# Patient Record
Sex: MALE | Race: WHITE | NOT HISPANIC OR LATINO | Employment: OTHER | URBAN - METROPOLITAN AREA
[De-identification: names, ages, dates, MRNs, and addresses within clinical notes are randomized per-mention and may not be internally consistent; named-entity substitution may affect disease eponyms.]

---

## 2017-05-10 LAB — CRC RECOMMENDATION EXT: NORMAL

## 2017-06-06 ENCOUNTER — HISTORICAL (OUTPATIENT)
Dept: RADIOLOGY | Facility: HOSPITAL | Age: 67
End: 2017-06-06

## 2017-06-14 ENCOUNTER — HISTORICAL (OUTPATIENT)
Dept: ADMINISTRATIVE | Facility: HOSPITAL | Age: 67
End: 2017-06-14

## 2017-06-14 LAB
ABS NEUT (OLG): 7.44 X10(3)/MCL (ref 2.1–9.2)
ALBUMIN SERPL-MCNC: 3.1 GM/DL (ref 3.4–5)
ALBUMIN/GLOB SERPL: 0.8 RATIO (ref 1.1–2)
ALP SERPL-CCNC: 65 UNIT/L (ref 50–136)
ALT SERPL-CCNC: 50 UNIT/L (ref 12–78)
AST SERPL-CCNC: 32 UNIT/L (ref 15–37)
BASOPHILS # BLD AUTO: 0 X10(3)/MCL (ref 0–0.2)
BASOPHILS NFR BLD AUTO: 0.5 %
BILIRUB SERPL-MCNC: 0.4 MG/DL (ref 0.2–1)
BILIRUBIN DIRECT+TOT PNL SERPL-MCNC: 0.1 MG/DL (ref 0–0.5)
BILIRUBIN DIRECT+TOT PNL SERPL-MCNC: 0.3 MG/DL (ref 0–0.8)
BUN SERPL-MCNC: 16 MG/DL (ref 7–18)
CALCIUM SERPL-MCNC: 9.2 MG/DL (ref 8.5–10.1)
CHLORIDE SERPL-SCNC: 98 MMOL/L (ref 98–107)
CO2 SERPL-SCNC: 28 MMOL/L (ref 21–32)
CREAT SERPL-MCNC: 0.87 MG/DL (ref 0.7–1.3)
EOSINOPHIL # BLD AUTO: 0.2 X10(3)/MCL (ref 0–0.9)
EOSINOPHIL NFR BLD AUTO: 1.6 %
ERYTHROCYTE [DISTWIDTH] IN BLOOD BY AUTOMATED COUNT: 13.4 % (ref 11.5–17)
GLOBULIN SER-MCNC: 3.8 GM/DL (ref 2.4–3.5)
GLUCOSE SERPL-MCNC: 148 MG/DL (ref 74–106)
HCT VFR BLD AUTO: 29.4 % (ref 42–52)
HGB BLD-MCNC: 9.3 GM/DL (ref 14–18)
LDH SERPL-CCNC: 272 UNIT/L (ref 87–241)
LYMPHOCYTES # BLD AUTO: 2.2 X10(3)/MCL (ref 0.6–4.6)
LYMPHOCYTES NFR BLD AUTO: 20 %
MCH RBC QN AUTO: 28.2 PG (ref 27–31)
MCHC RBC AUTO-ENTMCNC: 31.6 GM/DL (ref 33–36)
MCV RBC AUTO: 89.1 FL (ref 80–94)
MONOCYTES # BLD AUTO: 1.2 X10(3)/MCL (ref 0.1–1.3)
MONOCYTES NFR BLD AUTO: 10.6 %
NEUTROPHILS # BLD AUTO: 7.4 X10(3)/MCL (ref 2.1–9.2)
NEUTROPHILS NFR BLD AUTO: 67.3 %
PLATELET # BLD AUTO: 366 X10(3)/MCL (ref 130–400)
PMV BLD AUTO: 9.9 FL (ref 9.4–12.4)
POTASSIUM SERPL-SCNC: 4.9 MMOL/L (ref 3.5–5.1)
PROT SERPL-MCNC: 6.9 GM/DL (ref 6.4–8.2)
RBC # BLD AUTO: 3.3 X10(6)/MCL (ref 4.7–6.1)
SODIUM SERPL-SCNC: 138 MMOL/L (ref 136–145)
URATE SERPL-MCNC: 5.9 MG/DL (ref 2.6–7.2)
WBC # SPEC AUTO: 11 X10(3)/MCL (ref 4.5–11.5)

## 2017-06-26 ENCOUNTER — HISTORICAL (OUTPATIENT)
Dept: ADMINISTRATIVE | Facility: HOSPITAL | Age: 67
End: 2017-06-26

## 2017-06-26 LAB
ABS NEUT (OLG): 8.7 X10(3)/MCL (ref 2.1–9.2)
ALBUMIN SERPL-MCNC: 3.1 GM/DL (ref 3.4–5)
ALP SERPL-CCNC: 72 UNIT/L (ref 50–136)
ALT SERPL-CCNC: 26 UNIT/L (ref 12–78)
ANION GAP SERPL CALC-SCNC: 17 MMOL/L
AST SERPL-CCNC: 8 UNIT/L (ref 15–37)
BASOPHILS # BLD AUTO: 0 X10(3)/MCL (ref 0–0.2)
BASOPHILS NFR BLD AUTO: 0.1 %
BILIRUB SERPL-MCNC: 0.7 MG/DL (ref 0.2–1)
BILIRUBIN DIRECT+TOT PNL SERPL-MCNC: 0.2 MG/DL (ref 0–0.5)
BILIRUBIN DIRECT+TOT PNL SERPL-MCNC: 0.5 MG/DL (ref 0–0.8)
BUN SERPL-MCNC: 10 MG/DL (ref 7–18)
CHLORIDE SERPL-SCNC: 101 MMOL/L (ref 98–109)
CREAT SERPL-MCNC: 0.7 MG/DL (ref 0.6–1.3)
EOSINOPHIL # BLD AUTO: 0.2 X10(3)/MCL (ref 0–0.9)
EOSINOPHIL NFR BLD AUTO: 2 %
ERYTHROCYTE [DISTWIDTH] IN BLOOD BY AUTOMATED COUNT: 14.5 % (ref 11.5–17)
GLUCOSE SERPL-MCNC: 159 MG/DL (ref 70–105)
HCT VFR BLD AUTO: 36.2 % (ref 42–52)
HCT VFR BLD CALC: 34 % (ref 38–51)
HGB BLD-MCNC: 11.2 GM/DL (ref 14–18)
HGB BLD-MCNC: 11.6 MG/DL (ref 12–17)
LIVER PROFILE INTERP: ABNORMAL
LYMPHOCYTES # BLD AUTO: 0.9 X10(3)/MCL (ref 0.6–4.6)
LYMPHOCYTES NFR BLD AUTO: 9 %
MAGNESIUM SERPL-MCNC: 2.4 MG/DL (ref 1.8–2.4)
MCH RBC QN AUTO: 27.4 PG (ref 27–31)
MCHC RBC AUTO-ENTMCNC: 30.9 GM/DL (ref 33–36)
MCV RBC AUTO: 88.5 FL (ref 80–94)
MONOCYTES # BLD AUTO: 0 X10(3)/MCL (ref 0.1–1.3)
MONOCYTES NFR BLD AUTO: 0.4 %
NEUTROPHILS # BLD AUTO: 8.7 X10(3)/MCL (ref 2.1–9.2)
NEUTROPHILS NFR BLD AUTO: 88.5 %
PHOSPHATE SERPL-MCNC: 2.1 MG/DL (ref 2.5–4.9)
PLATELET # BLD AUTO: 154 X10(3)/MCL (ref 130–400)
PMV BLD AUTO: 9.7 FL (ref 9.4–12.4)
POC IONIZED CALCIUM: 1.11 MMOL/L (ref 1.12–1.32)
POC TCO2: 26 MMOL/L (ref 22–27)
POTASSIUM BLD-SCNC: 4 MMOL/L (ref 3.5–4.9)
PROT SERPL-MCNC: 6.2 GM/DL (ref 6.4–8.2)
RBC # BLD AUTO: 4.09 X10(6)/MCL (ref 4.7–6.1)
SODIUM BLD-SCNC: 139 MMOL/L (ref 138–146)
WBC # SPEC AUTO: 9.8 X10(3)/MCL (ref 4.5–11.5)

## 2017-06-29 ENCOUNTER — HISTORICAL (OUTPATIENT)
Dept: HEMATOLOGY/ONCOLOGY | Facility: CLINIC | Age: 67
End: 2017-06-29

## 2017-06-29 LAB
ABS NEUT (OLG): 1.49 X10(3)/MCL (ref 2.1–9.2)
ALBUMIN SERPL-MCNC: 3.6 GM/DL (ref 3.4–5)
ALP SERPL-CCNC: 74 UNIT/L (ref 50–136)
ALT SERPL-CCNC: 25 UNIT/L (ref 12–78)
ANION GAP SERPL CALC-SCNC: 18 MMOL/L
AST SERPL-CCNC: 9 UNIT/L (ref 15–37)
BASOPHILS # BLD AUTO: 0 X10(3)/MCL (ref 0–0.2)
BASOPHILS NFR BLD AUTO: 0.6 %
BASOPHILS NFR BLD MANUAL: 1 % (ref 0–2)
BILIRUB SERPL-MCNC: 0.7 MG/DL (ref 0.2–1)
BILIRUBIN DIRECT+TOT PNL SERPL-MCNC: 0.2 MG/DL (ref 0–0.2)
BILIRUBIN DIRECT+TOT PNL SERPL-MCNC: 0.5 MG/DL (ref 0–0.8)
BUN SERPL-MCNC: 8 MG/DL (ref 7–18)
CHLORIDE SERPL-SCNC: 100 MMOL/L (ref 98–109)
CREAT SERPL-MCNC: 1 MG/DL (ref 0.6–1.3)
EOSINOPHIL # BLD AUTO: 0.1 X10(3)/MCL (ref 0–0.9)
EOSINOPHIL NFR BLD AUTO: 3.4 %
EOSINOPHIL NFR BLD MANUAL: 4 % (ref 0–8)
ERYTHROCYTE [DISTWIDTH] IN BLOOD BY AUTOMATED COUNT: 14.6 % (ref 11.5–17)
GLUCOSE SERPL-MCNC: 188 MG/DL (ref 70–105)
HCT VFR BLD AUTO: 35.6 % (ref 42–52)
HCT VFR BLD CALC: 36 % (ref 38–51)
HGB BLD-MCNC: 11.2 GM/DL (ref 14–18)
HGB BLD-MCNC: 12.2 MG/DL (ref 12–17)
LIVER PROFILE INTERP: ABNORMAL
LYMPHOCYTES # BLD AUTO: 1.2 X10(3)/MCL (ref 0.6–4.6)
LYMPHOCYTES NFR BLD AUTO: 35.5 %
LYMPHOCYTES NFR BLD MANUAL: 35 % (ref 13–40)
MAGNESIUM SERPL-MCNC: 2.4 MG/DL (ref 1.8–2.4)
MCH RBC QN AUTO: 27.3 PG (ref 27–31)
MCHC RBC AUTO-ENTMCNC: 31.5 GM/DL (ref 33–36)
MCV RBC AUTO: 86.8 FL (ref 80–94)
METAMYELOCYTES NFR BLD MANUAL: 5 %
MONOCYTES # BLD AUTO: 0.6 X10(3)/MCL (ref 0.1–1.3)
MONOCYTES NFR BLD AUTO: 17.8 %
MONOCYTES NFR BLD MANUAL: 10 % (ref 2–11)
MYELOCYTES NFR BLD MANUAL: 4 %
NEUTROPHILS # BLD AUTO: 1.5 X10(3)/MCL (ref 2.1–9.2)
NEUTROPHILS NFR BLD AUTO: 42.7 %
NEUTROPHILS NFR BLD MANUAL: 39 % (ref 47–80)
NRBC BLD MANUAL-RTO: 2 %
PHOSPHATE SERPL-MCNC: 2 MG/DL (ref 2.5–4.9)
PLATELET # BLD AUTO: 86 X10(3)/MCL (ref 130–400)
PLATELET # BLD EST: ABNORMAL 10*3/UL
PMV BLD AUTO: 10 FL (ref 9.4–12.4)
POC IONIZED CALCIUM: 1.23 MMOL/L (ref 1.12–1.32)
POC TCO2: 26 MMOL/L (ref 22–27)
POIKILOCYTOSIS BLD QL SMEAR: ABNORMAL
POLYCHROMASIA BLD QL SMEAR: ABNORMAL
POTASSIUM BLD-SCNC: 4.5 MMOL/L (ref 3.5–4.9)
PROT SERPL-MCNC: 6.7 GM/DL (ref 6.4–8.2)
RBC # BLD AUTO: 4.1 X10(6)/MCL (ref 4.7–6.1)
RBC MORPH BLD: ABNORMAL
SODIUM BLD-SCNC: 139 MMOL/L (ref 138–146)
WBC # SPEC AUTO: 3.5 X10(3)/MCL (ref 4.5–11.5)

## 2017-07-03 ENCOUNTER — HISTORICAL (OUTPATIENT)
Dept: HEMATOLOGY/ONCOLOGY | Facility: CLINIC | Age: 67
End: 2017-07-03

## 2017-07-03 LAB
ABS NEUT (OLG): 7.67 X10(3)/MCL (ref 2.1–9.2)
ALBUMIN SERPL-MCNC: 3.6 GM/DL (ref 3.4–5)
ALP SERPL-CCNC: 81 UNIT/L (ref 50–136)
ALT SERPL-CCNC: 26 UNIT/L (ref 12–78)
ANION GAP SERPL CALC-SCNC: 15 MMOL/L
AST SERPL-CCNC: 14 UNIT/L (ref 15–37)
BASOPHILS # BLD AUTO: 0.1 X10(3)/MCL (ref 0–0.2)
BASOPHILS NFR BLD AUTO: 0.6 %
BILIRUB SERPL-MCNC: 0.5 MG/DL (ref 0.2–1)
BILIRUBIN DIRECT+TOT PNL SERPL-MCNC: 0.1 MG/DL (ref 0–0.2)
BILIRUBIN DIRECT+TOT PNL SERPL-MCNC: 0.4 MG/DL (ref 0–0.8)
BUN SERPL-MCNC: 14 MG/DL (ref 7–18)
CHLORIDE SERPL-SCNC: 104 MMOL/L (ref 98–109)
CREAT SERPL-MCNC: 1 MG/DL (ref 0.6–1.3)
EOSINOPHIL # BLD AUTO: 0.1 X10(3)/MCL (ref 0–0.9)
EOSINOPHIL NFR BLD AUTO: 1.3 %
ERYTHROCYTE [DISTWIDTH] IN BLOOD BY AUTOMATED COUNT: 16.7 % (ref 11.5–17)
GLUCOSE SERPL-MCNC: 169 MG/DL (ref 70–105)
HCT VFR BLD AUTO: 36.1 % (ref 42–52)
HCT VFR BLD CALC: 35 % (ref 38–51)
HGB BLD-MCNC: 11.1 GM/DL (ref 14–18)
HGB BLD-MCNC: 11.9 MG/DL (ref 12–17)
LIVER PROFILE INTERP: ABNORMAL
LYMPHOCYTES # BLD AUTO: 1.7 X10(3)/MCL (ref 0.6–4.6)
LYMPHOCYTES NFR BLD AUTO: 15.7 %
MAGNESIUM SERPL-MCNC: 2.4 MG/DL (ref 1.8–2.4)
MCH RBC QN AUTO: 27.4 PG (ref 27–31)
MCHC RBC AUTO-ENTMCNC: 30.7 GM/DL (ref 33–36)
MCV RBC AUTO: 89.1 FL (ref 80–94)
MONOCYTES # BLD AUTO: 1.2 X10(3)/MCL (ref 0.1–1.3)
MONOCYTES NFR BLD AUTO: 11.4 %
NEUTROPHILS # BLD AUTO: 7.7 X10(3)/MCL (ref 2.1–9.2)
NEUTROPHILS NFR BLD AUTO: 71 %
PHOSPHATE SERPL-MCNC: 3.4 MG/DL (ref 2.5–4.9)
PLATELET # BLD AUTO: 175 X10(3)/MCL (ref 130–400)
PMV BLD AUTO: 9.4 FL (ref 9.4–12.4)
POC IONIZED CALCIUM: 1.22 MMOL/L (ref 1.12–1.32)
POC TCO2: 27 MMOL/L (ref 22–27)
POTASSIUM BLD-SCNC: 4.4 MMOL/L (ref 3.5–4.9)
PROT SERPL-MCNC: 6.6 GM/DL (ref 6.4–8.2)
RBC # BLD AUTO: 4.05 X10(6)/MCL (ref 4.7–6.1)
SODIUM BLD-SCNC: 141 MMOL/L (ref 138–146)
WBC # SPEC AUTO: 10.8 X10(3)/MCL (ref 4.5–11.5)

## 2017-07-06 ENCOUNTER — HISTORICAL (OUTPATIENT)
Dept: HEMATOLOGY/ONCOLOGY | Facility: CLINIC | Age: 67
End: 2017-07-06

## 2017-07-06 LAB
ABS NEUT (OLG): 5.62 X10(3)/MCL (ref 2.1–9.2)
ALBUMIN SERPL-MCNC: 3.9 GM/DL (ref 3.4–5)
ALP SERPL-CCNC: 78 UNIT/L (ref 50–136)
ALT SERPL-CCNC: 25 UNIT/L (ref 12–78)
ANION GAP SERPL CALC-SCNC: 16 MMOL/L
AST SERPL-CCNC: 17 UNIT/L (ref 15–37)
BASOPHILS # BLD AUTO: 0.1 X10(3)/MCL (ref 0–0.2)
BASOPHILS NFR BLD AUTO: 1.1 %
BILIRUB SERPL-MCNC: 0.5 MG/DL (ref 0.2–1)
BILIRUBIN DIRECT+TOT PNL SERPL-MCNC: 0.1 MG/DL (ref 0–0.5)
BILIRUBIN DIRECT+TOT PNL SERPL-MCNC: 0.4 MG/DL (ref 0–0.8)
BUN SERPL-MCNC: 16 MG/DL (ref 7–18)
CHLORIDE SERPL-SCNC: 101 MMOL/L (ref 98–109)
CREAT SERPL-MCNC: 1 MG/DL (ref 0.6–1.3)
EOSINOPHIL # BLD AUTO: 0.1 X10(3)/MCL (ref 0–0.9)
EOSINOPHIL NFR BLD AUTO: 1.2 %
ERYTHROCYTE [DISTWIDTH] IN BLOOD BY AUTOMATED COUNT: 17.3 % (ref 11.5–17)
GLUCOSE SERPL-MCNC: 149 MG/DL (ref 70–105)
HCT VFR BLD AUTO: 33.8 % (ref 42–52)
HCT VFR BLD CALC: 32 % (ref 38–51)
HGB BLD-MCNC: 10.5 GM/DL (ref 14–18)
HGB BLD-MCNC: 10.9 MG/DL (ref 12–17)
LIVER PROFILE INTERP: NORMAL
LYMPHOCYTES # BLD AUTO: 1.5 X10(3)/MCL (ref 0.6–4.6)
LYMPHOCYTES NFR BLD AUTO: 17.9 %
MAGNESIUM SERPL-MCNC: 2.2 MG/DL (ref 1.8–2.4)
MCH RBC QN AUTO: 27.4 PG (ref 27–31)
MCHC RBC AUTO-ENTMCNC: 31.1 GM/DL (ref 33–36)
MCV RBC AUTO: 88.3 FL (ref 80–94)
MONOCYTES # BLD AUTO: 1 X10(3)/MCL (ref 0.1–1.3)
MONOCYTES NFR BLD AUTO: 11.6 %
NEUTROPHILS # BLD AUTO: 5.6 X10(3)/MCL (ref 2.1–9.2)
NEUTROPHILS NFR BLD AUTO: 68.2 %
PHOSPHATE SERPL-MCNC: 3.4 MG/DL (ref 2.5–4.9)
PLATELET # BLD AUTO: 259 X10(3)/MCL (ref 130–400)
PMV BLD AUTO: 9.2 FL (ref 9.4–12.4)
POC IONIZED CALCIUM: 1.13 MMOL/L (ref 1.12–1.32)
POC TCO2: 26 MMOL/L (ref 22–27)
POTASSIUM BLD-SCNC: 4 MMOL/L (ref 3.5–4.9)
PROT SERPL-MCNC: 6.9 GM/DL (ref 6.4–8.2)
RBC # BLD AUTO: 3.83 X10(6)/MCL (ref 4.7–6.1)
SODIUM BLD-SCNC: 138 MMOL/L (ref 138–146)
WBC # SPEC AUTO: 8.2 X10(3)/MCL (ref 4.5–11.5)

## 2017-07-17 ENCOUNTER — HISTORICAL (OUTPATIENT)
Dept: ADMINISTRATIVE | Facility: HOSPITAL | Age: 67
End: 2017-07-17

## 2017-07-17 LAB
ABS NEUT (OLG): 1.78 X10(3)/MCL (ref 2.1–9.2)
ALBUMIN SERPL-MCNC: 3.4 GM/DL (ref 3.4–5)
ALP SERPL-CCNC: 75 UNIT/L (ref 50–136)
ALT SERPL-CCNC: 20 UNIT/L (ref 12–78)
ANION GAP SERPL CALC-SCNC: 17 MMOL/L
AST SERPL-CCNC: 8 UNIT/L (ref 15–37)
BASOPHILS # BLD AUTO: 0 X10(3)/MCL (ref 0–0.2)
BASOPHILS NFR BLD AUTO: 1.2 %
BILIRUB SERPL-MCNC: 0.9 MG/DL (ref 0.2–1)
BILIRUBIN DIRECT+TOT PNL SERPL-MCNC: 0.2 MG/DL (ref 0–0.5)
BILIRUBIN DIRECT+TOT PNL SERPL-MCNC: 0.7 MG/DL (ref 0–0.8)
BUN SERPL-MCNC: 8 MG/DL (ref 7–18)
CHLORIDE SERPL-SCNC: 101 MMOL/L (ref 98–109)
CREAT SERPL-MCNC: 0.8 MG/DL (ref 0.6–1.3)
EOSINOPHIL # BLD AUTO: 0.1 X10(3)/MCL (ref 0–0.9)
EOSINOPHIL NFR BLD AUTO: 4.6 %
ERYTHROCYTE [DISTWIDTH] IN BLOOD BY AUTOMATED COUNT: 17.8 % (ref 11.5–17)
GLUCOSE SERPL-MCNC: 138 MG/DL (ref 70–105)
HCT VFR BLD AUTO: 31.7 % (ref 42–52)
HCT VFR BLD CALC: 31 % (ref 38–51)
HGB BLD-MCNC: 10.5 MG/DL (ref 12–17)
HGB BLD-MCNC: 9.9 GM/DL (ref 14–18)
LDH SERPL-CCNC: 214 UNIT/L (ref 87–241)
LIVER PROFILE INTERP: ABNORMAL
LYMPHOCYTES # BLD AUTO: 0.6 X10(3)/MCL (ref 0.6–4.6)
LYMPHOCYTES NFR BLD AUTO: 22.7 %
MAGNESIUM SERPL-MCNC: 2.2 MG/DL (ref 1.8–2.4)
MCH RBC QN AUTO: 27.7 PG (ref 27–31)
MCHC RBC AUTO-ENTMCNC: 31.2 GM/DL (ref 33–36)
MCV RBC AUTO: 88.5 FL (ref 80–94)
MONOCYTES # BLD AUTO: 0.1 X10(3)/MCL (ref 0.1–1.3)
MONOCYTES NFR BLD AUTO: 3.1 %
NEUTROPHILS # BLD AUTO: 1.8 X10(3)/MCL (ref 2.1–9.2)
NEUTROPHILS NFR BLD AUTO: 68.4 %
PHOSPHATE SERPL-MCNC: 2.4 MG/DL (ref 2.5–4.9)
PLATELET # BLD AUTO: 94 X10(3)/MCL (ref 130–400)
PMV BLD AUTO: 10 FL (ref 9.4–12.4)
POC IONIZED CALCIUM: 1.16 MMOL/L (ref 1.12–1.32)
POC TCO2: 26 MMOL/L (ref 22–27)
POTASSIUM BLD-SCNC: 4 MMOL/L (ref 3.5–4.9)
PROT SERPL-MCNC: 6.3 GM/DL (ref 6.4–8.2)
RBC # BLD AUTO: 3.58 X10(6)/MCL (ref 4.7–6.1)
SODIUM BLD-SCNC: 139 MMOL/L (ref 138–146)
URATE SERPL-MCNC: 4.3 MG/DL (ref 2.6–7.2)
WBC # SPEC AUTO: 2.6 X10(3)/MCL (ref 4.5–11.5)

## 2017-07-20 ENCOUNTER — HISTORICAL (OUTPATIENT)
Dept: HEMATOLOGY/ONCOLOGY | Facility: CLINIC | Age: 67
End: 2017-07-20

## 2017-07-20 LAB
ABS NEUT (OLG): 2 X10(3)/MCL (ref 2.1–9.2)
ALBUMIN SERPL-MCNC: 3.7 GM/DL (ref 3.4–5)
ALP SERPL-CCNC: 77 UNIT/L (ref 50–136)
ALT SERPL-CCNC: 25 UNIT/L (ref 12–78)
ANION GAP SERPL CALC-SCNC: 17 MMOL/L
AST SERPL-CCNC: 15 UNIT/L (ref 15–37)
BASOPHILS # BLD AUTO: 0 X10(3)/MCL (ref 0–0.2)
BASOPHILS NFR BLD AUTO: 1.4 %
BILIRUB SERPL-MCNC: 0.5 MG/DL (ref 0.2–1)
BILIRUBIN DIRECT+TOT PNL SERPL-MCNC: 0.1 MG/DL (ref 0–0.2)
BILIRUBIN DIRECT+TOT PNL SERPL-MCNC: 0.4 MG/DL (ref 0–0.8)
BUN SERPL-MCNC: 10 MG/DL (ref 7–18)
CHLORIDE SERPL-SCNC: 103 MMOL/L (ref 98–109)
CREAT SERPL-MCNC: 0.8 MG/DL (ref 0.6–1.3)
EOSINOPHIL # BLD AUTO: 0.1 X10(3)/MCL (ref 0–0.9)
EOSINOPHIL NFR BLD AUTO: 3 %
ERYTHROCYTE [DISTWIDTH] IN BLOOD BY AUTOMATED COUNT: 18 % (ref 11.5–17)
GLUCOSE SERPL-MCNC: 134 MG/DL (ref 70–105)
HCT VFR BLD AUTO: 33 % (ref 42–52)
HCT VFR BLD CALC: 32 % (ref 38–51)
HGB BLD-MCNC: 10.3 GM/DL (ref 14–18)
HGB BLD-MCNC: 10.9 MG/DL (ref 12–17)
LDH SERPL-CCNC: 219 UNIT/L (ref 87–241)
LIVER PROFILE INTERP: NORMAL
LYMPHOCYTES # BLD AUTO: 0.9 X10(3)/MCL (ref 0.6–4.6)
LYMPHOCYTES NFR BLD AUTO: 24.3 %
MAGNESIUM SERPL-MCNC: 2 MG/DL (ref 1.8–2.4)
MCH RBC QN AUTO: 27.5 PG (ref 27–31)
MCHC RBC AUTO-ENTMCNC: 31.2 GM/DL (ref 33–36)
MCV RBC AUTO: 88.2 FL (ref 80–94)
MONOCYTES # BLD AUTO: 0.6 X10(3)/MCL (ref 0.1–1.3)
MONOCYTES NFR BLD AUTO: 17.3 %
NEUTROPHILS # BLD AUTO: 2 X10(3)/MCL (ref 2.1–9.2)
NEUTROPHILS NFR BLD AUTO: 54 %
PHOSPHATE SERPL-MCNC: 2.2 MG/DL (ref 2.5–4.9)
PLATELET # BLD AUTO: 57 X10(3)/MCL (ref 130–400)
PMV BLD AUTO: 11.8 FL (ref 9.4–12.4)
POC IONIZED CALCIUM: 1.13 MMOL/L (ref 1.12–1.32)
POC TCO2: 24 MMOL/L (ref 22–27)
POTASSIUM BLD-SCNC: 3.6 MMOL/L (ref 3.5–4.9)
PROT SERPL-MCNC: 6.8 GM/DL (ref 6.4–8.2)
RBC # BLD AUTO: 3.74 X10(6)/MCL (ref 4.7–6.1)
SODIUM BLD-SCNC: 139 MMOL/L (ref 138–146)
URATE SERPL-MCNC: 5.6 MG/DL (ref 2.6–7.2)
WBC # SPEC AUTO: 3.7 X10(3)/MCL (ref 4.5–11.5)

## 2017-07-24 ENCOUNTER — HISTORICAL (OUTPATIENT)
Dept: HEMATOLOGY/ONCOLOGY | Facility: CLINIC | Age: 67
End: 2017-07-24

## 2017-07-24 LAB
ABS NEUT (OLG): 6.01 X10(3)/MCL (ref 2.1–9.2)
ALBUMIN SERPL-MCNC: 3.6 GM/DL (ref 3.4–5)
ALP SERPL-CCNC: 71 UNIT/L (ref 50–136)
ALT SERPL-CCNC: 25 UNIT/L (ref 12–78)
ANION GAP SERPL CALC-SCNC: 17 MMOL/L
AST SERPL-CCNC: 10 UNIT/L (ref 15–37)
BASOPHILS # BLD AUTO: 0 X10(3)/MCL (ref 0–0.2)
BASOPHILS NFR BLD AUTO: 0.6 %
BILIRUB SERPL-MCNC: 0.4 MG/DL (ref 0.2–1)
BILIRUBIN DIRECT+TOT PNL SERPL-MCNC: 0.1 MG/DL (ref 0–0.5)
BILIRUBIN DIRECT+TOT PNL SERPL-MCNC: 0.3 MG/DL (ref 0–0.8)
BUN SERPL-MCNC: 5 MG/DL (ref 7–18)
CHLORIDE SERPL-SCNC: 106 MMOL/L (ref 98–109)
CREAT SERPL-MCNC: 1 MG/DL (ref 0.6–1.3)
EOSINOPHIL # BLD AUTO: 0.1 X10(3)/MCL (ref 0–0.9)
EOSINOPHIL NFR BLD AUTO: 1.3 %
ERYTHROCYTE [DISTWIDTH] IN BLOOD BY AUTOMATED COUNT: 20.7 % (ref 11.5–17)
GLUCOSE SERPL-MCNC: 146 MG/DL (ref 70–105)
HCT VFR BLD AUTO: 32.7 % (ref 42–52)
HCT VFR BLD CALC: 31 % (ref 38–51)
HGB BLD-MCNC: 10.1 GM/DL (ref 14–18)
HGB BLD-MCNC: 10.5 MG/DL (ref 12–17)
LDH SERPL-CCNC: 258 UNIT/L (ref 87–241)
LIVER PROFILE INTERP: ABNORMAL
LYMPHOCYTES # BLD AUTO: 1.4 X10(3)/MCL (ref 0.6–4.6)
LYMPHOCYTES NFR BLD AUTO: 16.4 %
MAGNESIUM SERPL-MCNC: 2.7 MG/DL (ref 1.8–2.4)
MCH RBC QN AUTO: 28.4 PG (ref 27–31)
MCHC RBC AUTO-ENTMCNC: 30.9 GM/DL (ref 33–36)
MCV RBC AUTO: 91.9 FL (ref 80–94)
MONOCYTES # BLD AUTO: 0.8 X10(3)/MCL (ref 0.1–1.3)
MONOCYTES NFR BLD AUTO: 9.4 %
NEUTROPHILS # BLD AUTO: 6 X10(3)/MCL (ref 2.1–9.2)
NEUTROPHILS NFR BLD AUTO: 72.3 %
PHOSPHATE SERPL-MCNC: 3 MG/DL (ref 2.5–4.9)
PLATELET # BLD AUTO: 159 X10(3)/MCL (ref 130–400)
PMV BLD AUTO: 9.6 FL (ref 9.4–12.4)
POC IONIZED CALCIUM: 1.2 MMOL/L (ref 1.12–1.32)
POC TCO2: 27 MMOL/L (ref 22–27)
POTASSIUM BLD-SCNC: 4 MMOL/L (ref 3.5–4.9)
PROT SERPL-MCNC: 6.4 GM/DL (ref 6.4–8.2)
RBC # BLD AUTO: 3.56 X10(6)/MCL (ref 4.7–6.1)
SODIUM BLD-SCNC: 145 MMOL/L (ref 138–146)
URATE SERPL-MCNC: 7.2 MG/DL (ref 2.6–7.2)
WBC # SPEC AUTO: 8.3 X10(3)/MCL (ref 4.5–11.5)

## 2017-07-27 ENCOUNTER — HISTORICAL (OUTPATIENT)
Dept: HEMATOLOGY/ONCOLOGY | Facility: CLINIC | Age: 67
End: 2017-07-27

## 2017-07-27 LAB
ABS NEUT (OLG): 6.08 X10(3)/MCL (ref 2.1–9.2)
ALBUMIN SERPL-MCNC: 3.8 GM/DL (ref 3.4–5)
ALP SERPL-CCNC: 70 UNIT/L (ref 50–136)
ALT SERPL-CCNC: 23 UNIT/L (ref 12–78)
ANION GAP SERPL CALC-SCNC: 16 MMOL/L
ANION GAP SERPL CALC-SCNC: 16 MMOL/L
AST SERPL-CCNC: 11 UNIT/L (ref 15–37)
BASOPHILS # BLD AUTO: 0.1 X10(3)/MCL (ref 0–0.2)
BASOPHILS NFR BLD AUTO: 0.8 %
BILIRUB SERPL-MCNC: 0.4 MG/DL (ref 0.2–1)
BILIRUBIN DIRECT+TOT PNL SERPL-MCNC: 0.1 MG/DL (ref 0–0.2)
BILIRUBIN DIRECT+TOT PNL SERPL-MCNC: 0.3 MG/DL (ref 0–0.8)
BUN SERPL-MCNC: 11 MG/DL (ref 7–18)
BUN SERPL-MCNC: 11 MG/DL (ref 7–18)
CHLORIDE SERPL-SCNC: 103 MMOL/L (ref 98–109)
CHLORIDE SERPL-SCNC: 103 MMOL/L (ref 98–109)
CREAT SERPL-MCNC: 0.9 MG/DL (ref 0.6–1.3)
CREAT SERPL-MCNC: 0.9 MG/DL (ref 0.6–1.3)
EOSINOPHIL # BLD AUTO: 0.1 X10(3)/MCL (ref 0–0.9)
EOSINOPHIL NFR BLD AUTO: 1.3 %
ERYTHROCYTE [DISTWIDTH] IN BLOOD BY AUTOMATED COUNT: 20.9 % (ref 11.5–17)
GLUCOSE SERPL-MCNC: 128 MG/DL (ref 70–105)
GLUCOSE SERPL-MCNC: 128 MG/DL (ref 70–105)
HCT VFR BLD AUTO: 33.4 % (ref 42–52)
HGB BLD-MCNC: 10.4 GM/DL (ref 14–18)
LDH SERPL-CCNC: 201 UNIT/L (ref 87–241)
LIVER PROFILE INTERP: ABNORMAL
LYMPHOCYTES # BLD AUTO: 1.5 X10(3)/MCL (ref 0.6–4.6)
LYMPHOCYTES NFR BLD AUTO: 17.2 %
MAGNESIUM SERPL-MCNC: 2.2 MG/DL (ref 1.8–2.4)
MCH RBC QN AUTO: 28.6 PG (ref 27–31)
MCHC RBC AUTO-ENTMCNC: 31.1 GM/DL (ref 33–36)
MCV RBC AUTO: 91.8 FL (ref 80–94)
MONOCYTES # BLD AUTO: 0.8 X10(3)/MCL (ref 0.1–1.3)
MONOCYTES NFR BLD AUTO: 9.3 %
NEUTROPHILS # BLD AUTO: 6.1 X10(3)/MCL (ref 2.1–9.2)
NEUTROPHILS NFR BLD AUTO: 71.4 %
PHOSPHATE SERPL-MCNC: 3.5 MG/DL (ref 2.5–4.9)
PLATELET # BLD AUTO: 242 X10(3)/MCL (ref 130–400)
PMV BLD AUTO: 9.1 FL (ref 9.4–12.4)
POC CREATININE: 0.9 MG/DL (ref 0.6–1.3)
POC IONIZED CALCIUM: 1.21 MMOL/L (ref 1.12–1.32)
POC IONIZED CALCIUM: 1.21 MMOL/L (ref 1.12–1.32)
POC TCO2: 27 MMOL/L (ref 22–27)
POC TCO2: 27 MMOL/L (ref 22–27)
POTASSIUM BLD-SCNC: 4.1 MMOL/L (ref 3.5–4.9)
POTASSIUM BLD-SCNC: 4.1 MMOL/L (ref 3.5–4.9)
PROT SERPL-MCNC: 6.6 GM/DL (ref 6.4–8.2)
RBC # BLD AUTO: 3.64 X10(6)/MCL (ref 4.7–6.1)
SODIUM BLD-SCNC: 141 MMOL/L (ref 138–146)
SODIUM BLD-SCNC: 141 MMOL/L (ref 138–146)
URATE SERPL-MCNC: 5.8 MG/DL (ref 2.6–7.2)
WBC # SPEC AUTO: 8.5 X10(3)/MCL (ref 4.5–11.5)

## 2017-08-07 ENCOUNTER — HISTORICAL (OUTPATIENT)
Dept: ADMINISTRATIVE | Facility: HOSPITAL | Age: 67
End: 2017-08-07

## 2017-08-07 LAB
ABS NEUT (OLG): 1.88 X10(3)/MCL (ref 2.1–9.2)
ALBUMIN SERPL-MCNC: 3.5 GM/DL (ref 3.4–5)
ALP SERPL-CCNC: 66 UNIT/L (ref 50–136)
ALT SERPL-CCNC: 17 UNIT/L (ref 12–78)
ANION GAP SERPL CALC-SCNC: 17 MMOL/L
AST SERPL-CCNC: 4 UNIT/L (ref 15–37)
BASOPHILS # BLD AUTO: 0 X10(3)/MCL (ref 0–0.2)
BASOPHILS NFR BLD AUTO: 0.4 %
BILIRUB SERPL-MCNC: 0.7 MG/DL (ref 0.2–1)
BILIRUBIN DIRECT+TOT PNL SERPL-MCNC: 0.2 MG/DL (ref 0–0.2)
BILIRUBIN DIRECT+TOT PNL SERPL-MCNC: 0.5 MG/DL (ref 0–0.8)
BUN SERPL-MCNC: 16 MG/DL (ref 7–18)
CHLORIDE SERPL-SCNC: 100 MMOL/L (ref 98–109)
CREAT SERPL-MCNC: 0.8 MG/DL (ref 0.6–1.3)
EOSINOPHIL # BLD AUTO: 0 X10(3)/MCL (ref 0–0.9)
EOSINOPHIL NFR BLD AUTO: 2 %
ERYTHROCYTE [DISTWIDTH] IN BLOOD BY AUTOMATED COUNT: 20.6 % (ref 11.5–17)
GLUCOSE SERPL-MCNC: 109 MG/DL (ref 70–105)
HCT VFR BLD AUTO: 31.8 % (ref 42–52)
HCT VFR BLD CALC: 30 % (ref 38–51)
HGB BLD-MCNC: 10.1 GM/DL (ref 14–18)
HGB BLD-MCNC: 10.2 MG/DL (ref 12–17)
LDH SERPL-CCNC: 164 UNIT/L (ref 87–241)
LIVER PROFILE INTERP: ABNORMAL
LYMPHOCYTES # BLD AUTO: 0.5 X10(3)/MCL (ref 0.6–4.6)
LYMPHOCYTES NFR BLD AUTO: 19.1 %
MAGNESIUM SERPL-MCNC: 2 MG/DL (ref 1.8–2.4)
MCH RBC QN AUTO: 29 PG (ref 27–31)
MCHC RBC AUTO-ENTMCNC: 31.8 GM/DL (ref 33–36)
MCV RBC AUTO: 91.4 FL (ref 80–94)
MONOCYTES # BLD AUTO: 0 X10(3)/MCL (ref 0.1–1.3)
MONOCYTES NFR BLD AUTO: 2 %
NEUTROPHILS # BLD AUTO: 1.9 X10(3)/MCL (ref 2.1–9.2)
NEUTROPHILS NFR BLD AUTO: 76.5 %
PHOSPHATE SERPL-MCNC: 2.1 MG/DL (ref 2.5–4.9)
PLATELET # BLD AUTO: 72 X10(3)/MCL (ref 130–400)
PMV BLD AUTO: 9.9 FL (ref 9.4–12.4)
POC IONIZED CALCIUM: 1.19 MMOL/L (ref 1.12–1.32)
POC TCO2: 27 MMOL/L (ref 22–27)
POTASSIUM BLD-SCNC: 4.5 MMOL/L (ref 3.5–4.9)
PROT SERPL-MCNC: 6.5 GM/DL (ref 6.4–8.2)
RBC # BLD AUTO: 3.48 X10(6)/MCL (ref 4.7–6.1)
SODIUM BLD-SCNC: 139 MMOL/L (ref 138–146)
URATE SERPL-MCNC: 4.6 MG/DL (ref 2.6–7.2)
WBC # SPEC AUTO: 2.5 X10(3)/MCL (ref 4.5–11.5)

## 2017-08-10 ENCOUNTER — HISTORICAL (OUTPATIENT)
Dept: HEMATOLOGY/ONCOLOGY | Facility: CLINIC | Age: 67
End: 2017-08-10

## 2017-08-10 LAB
ABS NEUT (OLG): 0.41 X10(3)/MCL (ref 2.1–9.2)
ALBUMIN SERPL-MCNC: 3.6 GM/DL (ref 3.4–5)
ALP SERPL-CCNC: 54 UNIT/L (ref 50–136)
ALT SERPL-CCNC: 18 UNIT/L (ref 12–78)
ANION GAP SERPL CALC-SCNC: 18 MMOL/L
AST SERPL-CCNC: 6 UNIT/L (ref 15–37)
BASOPHILS # BLD AUTO: 0.1 X10(3)/MCL (ref 0–0.2)
BASOPHILS NFR BLD AUTO: 4.8 %
BILIRUB SERPL-MCNC: 0.4 MG/DL (ref 0.2–1)
BILIRUBIN DIRECT+TOT PNL SERPL-MCNC: 0.1 MG/DL (ref 0–0.5)
BILIRUBIN DIRECT+TOT PNL SERPL-MCNC: 0.3 MG/DL (ref 0–0.8)
BUN SERPL-MCNC: 10 MG/DL (ref 7–18)
CHLORIDE SERPL-SCNC: 99 MMOL/L (ref 98–109)
CREAT SERPL-MCNC: 0.9 MG/DL (ref 0.6–1.3)
EOSINOPHIL # BLD AUTO: 0 X10(3)/MCL (ref 0–0.9)
EOSINOPHIL NFR BLD AUTO: 3.2 %
ERYTHROCYTE [DISTWIDTH] IN BLOOD BY AUTOMATED COUNT: 19.9 % (ref 11.5–17)
GLUCOSE SERPL-MCNC: 141 MG/DL (ref 70–105)
HCT VFR BLD AUTO: 28.8 % (ref 42–52)
HCT VFR BLD CALC: 28 % (ref 38–51)
HGB BLD-MCNC: 9.3 GM/DL (ref 14–18)
HGB BLD-MCNC: 9.5 MG/DL (ref 12–17)
LDH SERPL-CCNC: 138 UNIT/L (ref 87–241)
LIVER PROFILE INTERP: ABNORMAL
LYMPHOCYTES # BLD AUTO: 0.4 X10(3)/MCL (ref 0.6–4.6)
LYMPHOCYTES NFR BLD AUTO: 36 %
MAGNESIUM SERPL-MCNC: 2.3 MG/DL (ref 1.8–2.4)
MCH RBC QN AUTO: 28.9 PG (ref 27–31)
MCHC RBC AUTO-ENTMCNC: 32.3 GM/DL (ref 33–36)
MCV RBC AUTO: 89.4 FL (ref 80–94)
MONOCYTES # BLD AUTO: 0.3 X10(3)/MCL (ref 0.1–1.3)
MONOCYTES NFR BLD AUTO: 23.2 %
NEUTROPHILS # BLD AUTO: 0.4 X10(3)/MCL (ref 2.1–9.2)
NEUTROPHILS NFR BLD AUTO: 32.8 %
PHOSPHATE SERPL-MCNC: 2.9 MG/DL (ref 2.5–4.9)
PLATELET # BLD AUTO: 49 X10(3)/MCL (ref 130–400)
PMV BLD AUTO: 10.2 FL (ref 9.4–12.4)
POC IONIZED CALCIUM: 1.26 MMOL/L (ref 1.12–1.32)
POC TCO2: 28 MMOL/L (ref 22–27)
POTASSIUM BLD-SCNC: 3.9 MMOL/L (ref 3.5–4.9)
PROT SERPL-MCNC: 6.6 GM/DL (ref 6.4–8.2)
RBC # BLD AUTO: 3.22 X10(6)/MCL (ref 4.7–6.1)
SODIUM BLD-SCNC: 140 MMOL/L (ref 138–146)
URATE SERPL-MCNC: 5.3 MG/DL (ref 2.6–7.2)
WBC # SPEC AUTO: 1.2 X10(3)/MCL (ref 4.5–11.5)

## 2017-08-14 ENCOUNTER — HISTORICAL (OUTPATIENT)
Dept: HEMATOLOGY/ONCOLOGY | Facility: CLINIC | Age: 67
End: 2017-08-14

## 2017-08-14 LAB
ABS NEUT (OLG): 6.4 X10(3)/MCL (ref 2.1–9.2)
ALBUMIN SERPL-MCNC: 3.5 GM/DL (ref 3.4–5)
ALP SERPL-CCNC: 64 UNIT/L (ref 50–136)
ALT SERPL-CCNC: 21 UNIT/L (ref 12–78)
ANION GAP SERPL CALC-SCNC: 15 MMOL/L
AST SERPL-CCNC: 15 UNIT/L (ref 15–37)
BASOPHILS # BLD AUTO: 0 X10(3)/MCL (ref 0–0.2)
BASOPHILS NFR BLD AUTO: 0.6 %
BILIRUB SERPL-MCNC: 0.3 MG/DL (ref 0.2–1)
BILIRUBIN DIRECT+TOT PNL SERPL-MCNC: 0.1 MG/DL (ref 0–0.5)
BILIRUBIN DIRECT+TOT PNL SERPL-MCNC: 0.2 MG/DL (ref 0–0.8)
BUN SERPL-MCNC: 8 MG/DL (ref 7–18)
CHLORIDE SERPL-SCNC: 104 MMOL/L (ref 98–109)
CREAT SERPL-MCNC: 0.9 MG/DL (ref 0.6–1.3)
EOSINOPHIL # BLD AUTO: 0.1 X10(3)/MCL (ref 0–0.9)
EOSINOPHIL NFR BLD AUTO: 1.1 %
ERYTHROCYTE [DISTWIDTH] IN BLOOD BY AUTOMATED COUNT: 22.4 % (ref 11.5–17)
GLUCOSE SERPL-MCNC: 154 MG/DL (ref 70–105)
HCT VFR BLD AUTO: 32.1 % (ref 42–52)
HCT VFR BLD CALC: 30 % (ref 38–51)
HGB BLD-MCNC: 10 GM/DL (ref 14–18)
HGB BLD-MCNC: 10.2 MG/DL (ref 12–17)
LDH SERPL-CCNC: 234 UNIT/L (ref 87–241)
LIVER PROFILE INTERP: NORMAL
LYMPHOCYTES # BLD AUTO: 1 X10(3)/MCL (ref 0.6–4.6)
LYMPHOCYTES NFR BLD AUTO: 11.2 %
MAGNESIUM SERPL-MCNC: 2.4 MG/DL (ref 1.8–2.4)
MCH RBC QN AUTO: 29.2 PG (ref 27–31)
MCHC RBC AUTO-ENTMCNC: 31.2 GM/DL (ref 33–36)
MCV RBC AUTO: 93.6 FL (ref 80–94)
MONOCYTES # BLD AUTO: 1 X10(3)/MCL (ref 0.1–1.3)
MONOCYTES NFR BLD AUTO: 11.8 %
NEUTROPHILS # BLD AUTO: 6.4 X10(3)/MCL (ref 2.1–9.2)
NEUTROPHILS NFR BLD AUTO: 75.3 %
PHOSPHATE SERPL-MCNC: 3.1 MG/DL (ref 2.5–4.9)
PLATELET # BLD AUTO: 195 X10(3)/MCL (ref 130–400)
PMV BLD AUTO: 9.5 FL (ref 9.4–12.4)
POC IONIZED CALCIUM: 1.2 MMOL/L (ref 1.12–1.32)
POC TCO2: 27 MMOL/L (ref 22–27)
POTASSIUM BLD-SCNC: 4.2 MMOL/L (ref 3.5–4.9)
PROT SERPL-MCNC: 6.5 GM/DL (ref 6.4–8.2)
RBC # BLD AUTO: 3.43 X10(6)/MCL (ref 4.7–6.1)
SODIUM BLD-SCNC: 141 MMOL/L (ref 138–146)
URATE SERPL-MCNC: 7.2 MG/DL (ref 2.6–7.2)
WBC # SPEC AUTO: 8.5 X10(3)/MCL (ref 4.5–11.5)

## 2017-08-28 ENCOUNTER — HISTORICAL (OUTPATIENT)
Dept: ADMINISTRATIVE | Facility: HOSPITAL | Age: 67
End: 2017-08-28

## 2017-08-28 LAB
ABS NEUT (OLG): 1.37 X10(3)/MCL (ref 2.1–9.2)
ALBUMIN SERPL-MCNC: 3.7 GM/DL (ref 3.4–5)
ALP SERPL-CCNC: 57 UNIT/L (ref 50–136)
ALT SERPL-CCNC: 13 UNIT/L (ref 12–78)
ANION GAP SERPL CALC-SCNC: 18 MMOL/L
AST SERPL-CCNC: <3 UNIT/L (ref 15–37)
BASOPHILS # BLD AUTO: 0 X10(3)/MCL (ref 0–0.2)
BASOPHILS NFR BLD AUTO: 0.5 %
BILIRUB SERPL-MCNC: 0.7 MG/DL (ref 0.2–1)
BILIRUBIN DIRECT+TOT PNL SERPL-MCNC: 0.2 MG/DL (ref 0–0.2)
BILIRUBIN DIRECT+TOT PNL SERPL-MCNC: 0.5 MG/DL (ref 0–0.8)
BUN SERPL-MCNC: 13 MG/DL (ref 7–18)
CHLORIDE SERPL-SCNC: 99 MMOL/L (ref 98–109)
CREAT SERPL-MCNC: 0.7 MG/DL (ref 0.6–1.3)
EOSINOPHIL # BLD AUTO: 0 X10(3)/MCL (ref 0–0.9)
EOSINOPHIL NFR BLD AUTO: 2.7 %
ERYTHROCYTE [DISTWIDTH] IN BLOOD BY AUTOMATED COUNT: 20.1 % (ref 11.5–17)
GLUCOSE SERPL-MCNC: 155 MG/DL (ref 70–105)
HCT VFR BLD AUTO: 30.4 % (ref 42–52)
HCT VFR BLD CALC: 32 % (ref 38–51)
HGB BLD-MCNC: 10.9 MG/DL (ref 12–17)
HGB BLD-MCNC: 9.6 GM/DL (ref 14–18)
LIVER PROFILE INTERP: ABNORMAL
LYMPHOCYTES # BLD AUTO: 0.3 X10(3)/MCL (ref 0.6–4.6)
LYMPHOCYTES NFR BLD AUTO: 18.7 %
MAGNESIUM SERPL-MCNC: 2.3 MG/DL (ref 1.8–2.4)
MCH RBC QN AUTO: 29.6 PG (ref 27–31)
MCHC RBC AUTO-ENTMCNC: 31.6 GM/DL (ref 33–36)
MCV RBC AUTO: 93.8 FL (ref 80–94)
MONOCYTES # BLD AUTO: 0 X10(3)/MCL (ref 0.1–1.3)
MONOCYTES NFR BLD AUTO: 2.7 %
NEUTROPHILS # BLD AUTO: 1.4 X10(3)/MCL (ref 2.1–9.2)
NEUTROPHILS NFR BLD AUTO: 75.4 %
PLATELET # BLD AUTO: 41 X10(3)/MCL (ref 130–400)
PMV BLD AUTO: 10.6 FL (ref 9.4–12.4)
POC IONIZED CALCIUM: 1.17 MMOL/L (ref 1.12–1.32)
POC TCO2: 24 MMOL/L (ref 22–27)
POTASSIUM BLD-SCNC: 4.1 MMOL/L (ref 3.5–4.9)
PROT SERPL-MCNC: 6.6 GM/DL (ref 6.4–8.2)
RBC # BLD AUTO: 3.24 X10(6)/MCL (ref 4.7–6.1)
SODIUM BLD-SCNC: 137 MMOL/L (ref 138–146)
WBC # SPEC AUTO: 1.8 X10(3)/MCL (ref 4.5–11.5)

## 2017-08-31 ENCOUNTER — HISTORICAL (OUTPATIENT)
Dept: INFUSION THERAPY | Facility: HOSPITAL | Age: 67
End: 2017-08-31

## 2017-08-31 LAB
ABS NEUT (OLG): 1.98 X10(3)/MCL (ref 2.1–9.2)
ALBUMIN SERPL-MCNC: 3.6 GM/DL (ref 3.4–5)
ALP SERPL-CCNC: 56 UNIT/L (ref 50–136)
ALT SERPL-CCNC: 17 UNIT/L (ref 12–78)
ANION GAP SERPL CALC-SCNC: 18 MMOL/L
AST SERPL-CCNC: 6 UNIT/L (ref 15–37)
BASOPHILS # BLD AUTO: 0 X10(3)/MCL (ref 0–0.2)
BASOPHILS NFR BLD AUTO: 1.3 %
BILIRUB SERPL-MCNC: 0.5 MG/DL (ref 0.2–1)
BILIRUBIN DIRECT+TOT PNL SERPL-MCNC: 0.1 MG/DL (ref 0–0.5)
BILIRUBIN DIRECT+TOT PNL SERPL-MCNC: 0.4 MG/DL (ref 0–0.8)
BUN SERPL-MCNC: 7 MG/DL (ref 7–18)
CHLORIDE SERPL-SCNC: 100 MMOL/L (ref 98–109)
CREAT SERPL-MCNC: 1 MG/DL (ref 0.6–1.3)
EOSINOPHIL # BLD AUTO: 0.1 X10(3)/MCL (ref 0–0.9)
EOSINOPHIL NFR BLD AUTO: 2.3 %
ERYTHROCYTE [DISTWIDTH] IN BLOOD BY AUTOMATED COUNT: 18.7 % (ref 11.5–17)
GLUCOSE SERPL-MCNC: 140 MG/DL (ref 70–105)
HCT VFR BLD AUTO: 31.5 % (ref 42–52)
HCT VFR BLD CALC: 29 % (ref 38–51)
HGB BLD-MCNC: 9.7 GM/DL (ref 14–18)
HGB BLD-MCNC: 9.9 MG/DL (ref 12–17)
LDH SERPL-CCNC: 174 UNIT/L (ref 87–241)
LIVER PROFILE INTERP: ABNORMAL
LYMPHOCYTES # BLD AUTO: 0.5 X10(3)/MCL (ref 0.6–4.6)
LYMPHOCYTES NFR BLD AUTO: 17.3 %
MAGNESIUM SERPL-MCNC: 2.1 MG/DL (ref 1.8–2.4)
MCH RBC QN AUTO: 28.5 PG (ref 27–31)
MCHC RBC AUTO-ENTMCNC: 30.8 GM/DL (ref 33–36)
MCV RBC AUTO: 92.6 FL (ref 80–94)
MONOCYTES # BLD AUTO: 0.4 X10(3)/MCL (ref 0.1–1.3)
MONOCYTES NFR BLD AUTO: 14.7 %
NEUTROPHILS # BLD AUTO: 2 X10(3)/MCL (ref 2.1–9.2)
NEUTROPHILS NFR BLD AUTO: 64.4 %
PHOSPHATE SERPL-MCNC: 2.5 MG/DL (ref 2.5–4.9)
PLATELET # BLD AUTO: 28 X10(3)/MCL (ref 130–400)
PMV BLD AUTO: 10.1 FL (ref 9.4–12.4)
POC IONIZED CALCIUM: 1.2 MMOL/L (ref 1.12–1.32)
POC TCO2: 26 MMOL/L (ref 22–27)
POTASSIUM BLD-SCNC: 3.7 MMOL/L (ref 3.5–4.9)
PROT SERPL-MCNC: 6.8 GM/DL (ref 6.4–8.2)
RBC # BLD AUTO: 3.4 X10(6)/MCL (ref 4.7–6.1)
SODIUM BLD-SCNC: 139 MMOL/L (ref 138–146)
URATE SERPL-MCNC: 5.3 MG/DL (ref 2.6–7.2)
WBC # SPEC AUTO: 3.1 X10(3)/MCL (ref 4.5–11.5)

## 2017-09-05 ENCOUNTER — HISTORICAL (OUTPATIENT)
Dept: HEMATOLOGY/ONCOLOGY | Facility: CLINIC | Age: 67
End: 2017-09-05

## 2017-09-05 LAB
ABS NEUT (OLG): 9.01 X10(3)/MCL (ref 2.1–9.2)
ALBUMIN SERPL-MCNC: 3.6 GM/DL (ref 3.4–5)
ALP SERPL-CCNC: 70 UNIT/L (ref 50–136)
ALT SERPL-CCNC: 16 UNIT/L (ref 12–78)
ANION GAP SERPL CALC-SCNC: 17 MMOL/L
ANISOCYTOSIS BLD QL SMEAR: ABNORMAL
AST SERPL-CCNC: 9 UNIT/L (ref 15–37)
BASOPHILS # BLD AUTO: 0 X10(3)/MCL (ref 0–0.2)
BASOPHILS NFR BLD AUTO: 0.4 %
BILIRUB SERPL-MCNC: 0.3 MG/DL (ref 0.2–1)
BILIRUBIN DIRECT+TOT PNL SERPL-MCNC: 0.1 MG/DL (ref 0–0.2)
BILIRUBIN DIRECT+TOT PNL SERPL-MCNC: 0.2 MG/DL (ref 0–0.8)
BUN SERPL-MCNC: 9 MG/DL (ref 7–18)
CHLORIDE SERPL-SCNC: 106 MMOL/L (ref 98–109)
CREAT SERPL-MCNC: 1 MG/DL (ref 0.6–1.3)
DACRYOCYTES BLD QL SMEAR: SLIGHT % (ref 0–0)
EOSINOPHIL # BLD AUTO: 0.1 X10(3)/MCL (ref 0–0.9)
EOSINOPHIL NFR BLD AUTO: 0.6 %
EOSINOPHIL NFR BLD MANUAL: 1 % (ref 0–8)
ERYTHROCYTE [DISTWIDTH] IN BLOOD BY AUTOMATED COUNT: 20.8 % (ref 11.5–17)
GLUCOSE SERPL-MCNC: 173 MG/DL (ref 70–105)
HCT VFR BLD AUTO: 29.8 % (ref 42–52)
HCT VFR BLD CALC: 29 % (ref 38–51)
HGB BLD-MCNC: 9.3 GM/DL (ref 14–18)
HGB BLD-MCNC: 9.9 MG/DL (ref 12–17)
LDH SERPL-CCNC: 255 UNIT/L (ref 87–241)
LIVER PROFILE INTERP: ABNORMAL
LYMPHOCYTES # BLD AUTO: 1.1 X10(3)/MCL (ref 0.6–4.6)
LYMPHOCYTES NFR BLD AUTO: 9.7 %
LYMPHOCYTES NFR BLD MANUAL: 15 % (ref 13–40)
MAGNESIUM SERPL-MCNC: 2.2 MG/DL (ref 1.8–2.4)
MCH RBC QN AUTO: 30.4 PG (ref 27–31)
MCHC RBC AUTO-ENTMCNC: 31.2 GM/DL (ref 33–36)
MCV RBC AUTO: 97.4 FL (ref 80–94)
METAMYELOCYTES NFR BLD MANUAL: 3 %
MONOCYTES # BLD AUTO: 1 X10(3)/MCL (ref 0.1–1.3)
MONOCYTES NFR BLD AUTO: 8.8 %
MONOCYTES NFR BLD MANUAL: 4 % (ref 2–11)
MYELOCYTES NFR BLD MANUAL: 2 %
NEUTROPHILS # BLD AUTO: 9 X10(3)/MCL (ref 2.1–9.2)
NEUTROPHILS NFR BLD AUTO: 80.5 %
NEUTROPHILS NFR BLD MANUAL: 75 % (ref 47–80)
NRBC BLD MANUAL-RTO: 4 %
PHOSPHATE SERPL-MCNC: 3.3 MG/DL (ref 2.5–4.9)
PLATELET # BLD AUTO: 193 X10(3)/MCL (ref 130–400)
PLATELET # BLD EST: NORMAL 10*3/UL
PMV BLD AUTO: 9.3 FL (ref 9.4–12.4)
POC IONIZED CALCIUM: 1.26 MMOL/L (ref 1.12–1.32)
POC TCO2: 26 MMOL/L (ref 22–27)
POIKILOCYTOSIS BLD QL SMEAR: ABNORMAL
POLYCHROMASIA BLD QL SMEAR: ABNORMAL
POTASSIUM BLD-SCNC: 4.5 MMOL/L (ref 3.5–4.9)
PROT SERPL-MCNC: 6.4 GM/DL (ref 6.4–8.2)
RBC # BLD AUTO: 3.06 X10(6)/MCL (ref 4.7–6.1)
RBC MORPH BLD: ABNORMAL
SODIUM BLD-SCNC: 143 MMOL/L (ref 138–146)
URATE SERPL-MCNC: 6.7 MG/DL (ref 2.6–7.2)
WBC # SPEC AUTO: 11.2 X10(3)/MCL (ref 4.5–11.5)

## 2017-09-11 ENCOUNTER — HISTORICAL (OUTPATIENT)
Dept: INFUSION THERAPY | Facility: HOSPITAL | Age: 67
End: 2017-09-11

## 2017-09-11 LAB
ABS NEUT (OLG): 4.44 X10(3)/MCL (ref 2.1–9.2)
ANION GAP SERPL CALC-SCNC: 16 MMOL/L
BASOPHILS # BLD AUTO: 0.2 X10(3)/MCL (ref 0–0.2)
BASOPHILS NFR BLD AUTO: 2.2 %
BUN SERPL-MCNC: 10 MG/DL (ref 7–18)
CHLORIDE SERPL-SCNC: 106 MMOL/L (ref 98–109)
CREAT SERPL-MCNC: 0.9 MG/DL (ref 0.6–1.3)
EOSINOPHIL # BLD AUTO: 0.1 X10(3)/MCL (ref 0–0.9)
EOSINOPHIL NFR BLD AUTO: 0.9 %
ERYTHROCYTE [DISTWIDTH] IN BLOOD BY AUTOMATED COUNT: 20.9 % (ref 11.5–17)
GLUCOSE SERPL-MCNC: 146 MG/DL (ref 70–105)
HCT VFR BLD AUTO: 34.2 % (ref 42–52)
HCT VFR BLD CALC: 34 % (ref 38–51)
HGB BLD-MCNC: 10.6 GM/DL (ref 14–18)
HGB BLD-MCNC: 11.6 MG/DL (ref 12–17)
LYMPHOCYTES # BLD AUTO: 1.4 X10(3)/MCL (ref 0.6–4.6)
LYMPHOCYTES NFR BLD AUTO: 20.1 %
MCH RBC QN AUTO: 30.5 PG (ref 27–31)
MCHC RBC AUTO-ENTMCNC: 31 GM/DL (ref 33–36)
MCV RBC AUTO: 98.6 FL (ref 80–94)
MONOCYTES # BLD AUTO: 0.9 X10(3)/MCL (ref 0.1–1.3)
MONOCYTES NFR BLD AUTO: 12.8 %
NEUTROPHILS # BLD AUTO: 4.4 X10(3)/MCL (ref 2.1–9.2)
NEUTROPHILS NFR BLD AUTO: 64 %
PLATELET # BLD AUTO: 215 X10(3)/MCL (ref 130–400)
PMV BLD AUTO: 8.6 FL (ref 9.4–12.4)
POC IONIZED CALCIUM: 1.19 MMOL/L (ref 1.12–1.32)
POC TCO2: 26 MMOL/L (ref 22–27)
POTASSIUM BLD-SCNC: 5.8 MMOL/L (ref 3.5–4.9)
RBC # BLD AUTO: 3.47 X10(6)/MCL (ref 4.7–6.1)
SODIUM BLD-SCNC: 140 MMOL/L (ref 138–146)
WBC # SPEC AUTO: 6.9 X10(3)/MCL (ref 4.5–11.5)

## 2017-09-21 ENCOUNTER — HISTORICAL (OUTPATIENT)
Dept: HEMATOLOGY/ONCOLOGY | Facility: CLINIC | Age: 67
End: 2017-09-21

## 2017-09-21 LAB
ABS NEUT (OLG): 17.77 X10(3)/MCL (ref 2.1–9.2)
BASOPHILS # BLD AUTO: 0 X10(3)/MCL (ref 0–0.2)
BASOPHILS NFR BLD AUTO: 0.1 %
EOSINOPHIL # BLD AUTO: 0.1 X10(3)/MCL (ref 0–0.9)
EOSINOPHIL NFR BLD AUTO: 0.6 %
ERYTHROCYTE [DISTWIDTH] IN BLOOD BY AUTOMATED COUNT: 17.3 % (ref 11.5–17)
HCT VFR BLD AUTO: 31.4 % (ref 42–52)
HGB BLD-MCNC: 10 GM/DL (ref 14–18)
LYMPHOCYTES # BLD AUTO: 0.6 X10(3)/MCL (ref 0.6–4.6)
LYMPHOCYTES NFR BLD AUTO: 3 %
MCH RBC QN AUTO: 30.9 PG (ref 27–31)
MCHC RBC AUTO-ENTMCNC: 31.8 GM/DL (ref 33–36)
MCV RBC AUTO: 96.9 FL (ref 80–94)
MONOCYTES # BLD AUTO: 0 X10(3)/MCL (ref 0.1–1.3)
MONOCYTES NFR BLD AUTO: 0.1 %
NEUTROPHILS # BLD AUTO: 17.8 X10(3)/MCL (ref 2.1–9.2)
NEUTROPHILS NFR BLD AUTO: 96.2 %
PLATELET # BLD AUTO: 66 X10(3)/MCL (ref 130–400)
PMV BLD AUTO: 9 FL (ref 9.4–12.4)
RBC # BLD AUTO: 3.24 X10(6)/MCL (ref 4.7–6.1)
WBC # SPEC AUTO: 18.5 X10(3)/MCL (ref 4.5–11.5)

## 2017-09-25 ENCOUNTER — HISTORICAL (OUTPATIENT)
Dept: INFUSION THERAPY | Facility: HOSPITAL | Age: 67
End: 2017-09-25

## 2017-09-25 LAB
ABS NEUT (OLG): 0.08 X10(3)/MCL (ref 2.1–9.2)
ANION GAP SERPL CALC-SCNC: 16 MMOL/L
BASOPHILS # BLD AUTO: 0 X10(3)/MCL (ref 0–0.2)
BASOPHILS NFR BLD AUTO: 1.8 %
BUN SERPL-MCNC: 12 MG/DL (ref 7–18)
CHLORIDE SERPL-SCNC: 96 MMOL/L (ref 98–109)
CREAT SERPL-MCNC: 0.9 MG/DL (ref 0.6–1.3)
EOSINOPHIL # BLD AUTO: 0.1 X10(3)/MCL (ref 0–0.9)
EOSINOPHIL NFR BLD AUTO: 10.9 %
ERYTHROCYTE [DISTWIDTH] IN BLOOD BY AUTOMATED COUNT: 14.7 % (ref 11.5–17)
GLUCOSE SERPL-MCNC: 175 MG/DL (ref 70–105)
HCT VFR BLD AUTO: 31.5 % (ref 42–52)
HCT VFR BLD CALC: 30 % (ref 38–51)
HGB BLD-MCNC: 10.2 MG/DL (ref 12–17)
HGB BLD-MCNC: 10.3 GM/DL (ref 14–18)
LYMPHOCYTES # BLD AUTO: 0.4 X10(3)/MCL (ref 0.6–4.6)
LYMPHOCYTES NFR BLD AUTO: 63.6 %
MAGNESIUM SERPL-MCNC: 2.1 MG/DL (ref 1.8–2.4)
MCH RBC QN AUTO: 31.4 PG (ref 27–31)
MCHC RBC AUTO-ENTMCNC: 32.7 GM/DL (ref 33–36)
MCV RBC AUTO: 96 FL (ref 80–94)
MONOCYTES # BLD AUTO: 0 X10(3)/MCL (ref 0.1–1.3)
MONOCYTES NFR BLD AUTO: 9.1 %
NEUTROPHILS # BLD AUTO: 0.1 X10(3)/MCL (ref 2.1–9.2)
NEUTROPHILS NFR BLD AUTO: 14.6 %
PHOSPHATE SERPL-MCNC: 2.6 MG/DL (ref 2.5–4.9)
PLATELET # BLD AUTO: 5 X10(3)/MCL (ref 130–400)
PMV BLD AUTO: ABNORMAL FL (ref 9.4–12.4)
POC IONIZED CALCIUM: 1.17 MMOL/L (ref 1.12–1.32)
POC TCO2: 27 MMOL/L (ref 22–27)
POTASSIUM BLD-SCNC: 4.1 MMOL/L (ref 3.5–4.9)
RBC # BLD AUTO: 3.28 X10(6)/MCL (ref 4.7–6.1)
SODIUM BLD-SCNC: 134 MMOL/L (ref 138–146)
TRANSFUSION ORDER: NORMAL
URATE SERPL-MCNC: 4 MG/DL (ref 2.6–7.2)
WBC # SPEC AUTO: 0.6 X10(3)/MCL (ref 4.5–11.5)

## 2017-09-28 ENCOUNTER — HISTORICAL (OUTPATIENT)
Dept: HEMATOLOGY/ONCOLOGY | Facility: CLINIC | Age: 67
End: 2017-09-28

## 2017-09-28 LAB
ABS NEUT (OLG): 3.05 X10(3)/MCL (ref 2.1–9.2)
ALBUMIN SERPL-MCNC: 3.6 GM/DL (ref 3.4–5)
ALBUMIN/GLOB SERPL: 1.2 RATIO (ref 1.1–2)
ALP SERPL-CCNC: 59 UNIT/L (ref 50–136)
ALT SERPL-CCNC: 19 UNIT/L (ref 12–78)
AST SERPL-CCNC: 9 UNIT/L (ref 15–37)
BASOPHILS # BLD AUTO: 0 X10(3)/MCL (ref 0–0.2)
BASOPHILS NFR BLD AUTO: 0.2 %
BILIRUB SERPL-MCNC: 0.5 MG/DL (ref 0.2–1)
BILIRUBIN DIRECT+TOT PNL SERPL-MCNC: 0.1 MG/DL (ref 0–0.5)
BILIRUBIN DIRECT+TOT PNL SERPL-MCNC: 0.4 MG/DL (ref 0–0.8)
BUN SERPL-MCNC: 14 MG/DL (ref 7–18)
CALCIUM SERPL-MCNC: 9.1 MG/DL (ref 8.5–10.1)
CHLORIDE SERPL-SCNC: 104 MMOL/L (ref 98–107)
CO2 SERPL-SCNC: 26 MMOL/L (ref 21–32)
CREAT SERPL-MCNC: 0.93 MG/DL (ref 0.7–1.3)
EOSINOPHIL # BLD AUTO: 0.1 X10(3)/MCL (ref 0–0.9)
EOSINOPHIL NFR BLD AUTO: 1.7 %
ERYTHROCYTE [DISTWIDTH] IN BLOOD BY AUTOMATED COUNT: 14.2 % (ref 11.5–17)
GLOBULIN SER-MCNC: 3 GM/DL (ref 2.4–3.5)
GLUCOSE SERPL-MCNC: 154 MG/DL (ref 74–106)
HCT VFR BLD AUTO: 27 % (ref 42–52)
HGB BLD-MCNC: 8.8 GM/DL (ref 14–18)
LYMPHOCYTES # BLD AUTO: 0.6 X10(3)/MCL (ref 0.6–4.6)
LYMPHOCYTES NFR BLD AUTO: 15.1 %
MCH RBC QN AUTO: 31.1 PG (ref 27–31)
MCHC RBC AUTO-ENTMCNC: 32.6 GM/DL (ref 33–36)
MCV RBC AUTO: 95.4 FL (ref 80–94)
MONOCYTES # BLD AUTO: 0.4 X10(3)/MCL (ref 0.1–1.3)
MONOCYTES NFR BLD AUTO: 9.6 %
NEUTROPHILS # BLD AUTO: 3 X10(3)/MCL (ref 2.1–9.2)
NEUTROPHILS NFR BLD AUTO: 73.4 %
PLATELET # BLD AUTO: 26 X10(3)/MCL (ref 130–400)
PMV BLD AUTO: 9.6 FL (ref 9.4–12.4)
POTASSIUM SERPL-SCNC: 4.3 MMOL/L (ref 3.5–5.1)
PROT SERPL-MCNC: 6.6 GM/DL (ref 6.4–8.2)
RBC # BLD AUTO: 2.83 X10(6)/MCL (ref 4.7–6.1)
SODIUM SERPL-SCNC: 142 MMOL/L (ref 136–145)
WBC # SPEC AUTO: 4.2 X10(3)/MCL (ref 4.5–11.5)

## 2017-10-02 ENCOUNTER — HISTORICAL (OUTPATIENT)
Dept: ADMINISTRATIVE | Facility: HOSPITAL | Age: 67
End: 2017-10-02

## 2017-10-02 LAB
ABS NEUT (OLG): 6 X10(3)/MCL (ref 2.1–9.2)
ALBUMIN SERPL-MCNC: 3.5 GM/DL (ref 3.4–5)
ALP SERPL-CCNC: 67 UNIT/L (ref 50–136)
ALT SERPL-CCNC: 17 UNIT/L (ref 12–78)
ANION GAP SERPL CALC-SCNC: 16 MMOL/L
AST SERPL-CCNC: 10 UNIT/L (ref 15–37)
BASOPHILS # BLD AUTO: 0 X10(3)/MCL (ref 0–0.2)
BASOPHILS NFR BLD AUTO: 0.5 %
BILIRUB SERPL-MCNC: 0.3 MG/DL (ref 0.2–1)
BILIRUBIN DIRECT+TOT PNL SERPL-MCNC: 0.1 MG/DL (ref 0–0.5)
BILIRUBIN DIRECT+TOT PNL SERPL-MCNC: 0.2 MG/DL (ref 0–0.8)
BUN SERPL-MCNC: 7 MG/DL (ref 7–18)
CHLORIDE SERPL-SCNC: 105 MMOL/L (ref 98–109)
CREAT SERPL-MCNC: 0.9 MG/DL (ref 0.6–1.3)
EOSINOPHIL # BLD AUTO: 0.1 X10(3)/MCL (ref 0–0.9)
EOSINOPHIL NFR BLD AUTO: 0.7 %
ERYTHROCYTE [DISTWIDTH] IN BLOOD BY AUTOMATED COUNT: 15.7 % (ref 11.5–17)
GLUCOSE SERPL-MCNC: 122 MG/DL (ref 70–105)
HCT VFR BLD AUTO: 28.2 % (ref 42–52)
HCT VFR BLD CALC: 25 % (ref 38–51)
HGB BLD-MCNC: 8.5 MG/DL (ref 12–17)
HGB BLD-MCNC: 9 GM/DL (ref 14–18)
LIVER PROFILE INTERP: ABNORMAL
LYMPHOCYTES # BLD AUTO: 1 X10(3)/MCL (ref 0.6–4.6)
LYMPHOCYTES NFR BLD AUTO: 12 %
MAGNESIUM SERPL-MCNC: 2.2 MG/DL (ref 1.8–2.4)
MCH RBC QN AUTO: 31.9 PG (ref 27–31)
MCHC RBC AUTO-ENTMCNC: 31.9 GM/DL (ref 33–36)
MCV RBC AUTO: 100 FL (ref 80–94)
MONOCYTES # BLD AUTO: 1.1 X10(3)/MCL (ref 0.1–1.3)
MONOCYTES NFR BLD AUTO: 13.2 %
NEUTROPHILS # BLD AUTO: 6 X10(3)/MCL (ref 2.1–9.2)
NEUTROPHILS NFR BLD AUTO: 73.6 %
PHOSPHATE SERPL-MCNC: 3.1 MG/DL (ref 2.5–4.9)
PLATELET # BLD AUTO: 121 X10(3)/MCL (ref 130–400)
PMV BLD AUTO: 9.2 FL (ref 9.4–12.4)
POC IONIZED CALCIUM: 1.2 MMOL/L (ref 1.12–1.32)
POC TCO2: 26 MMOL/L (ref 22–27)
POTASSIUM BLD-SCNC: 4.4 MMOL/L (ref 3.5–4.9)
PROT SERPL-MCNC: 6.4 GM/DL (ref 6.4–8.2)
RBC # BLD AUTO: 2.82 X10(6)/MCL (ref 4.7–6.1)
SODIUM BLD-SCNC: 142 MMOL/L (ref 138–146)
URATE SERPL-MCNC: 5.9 MG/DL (ref 2.6–7.2)
WBC # SPEC AUTO: 8.2 X10(3)/MCL (ref 4.5–11.5)

## 2017-10-12 ENCOUNTER — HISTORICAL (OUTPATIENT)
Dept: HEMATOLOGY/ONCOLOGY | Facility: CLINIC | Age: 67
End: 2017-10-12

## 2017-10-12 LAB
ABS NEUT (OLG): 19.88 X10(3)/MCL (ref 2.1–9.2)
ALBUMIN SERPL-MCNC: 3.3 GM/DL (ref 3.4–5)
ALBUMIN/GLOB SERPL: 1.3 RATIO (ref 1.1–2)
ALP SERPL-CCNC: 53 UNIT/L (ref 50–136)
ALT SERPL-CCNC: 18 UNIT/L (ref 12–78)
AST SERPL-CCNC: 5 UNIT/L (ref 15–37)
BASOPHILS # BLD AUTO: 0 X10(3)/MCL (ref 0–0.2)
BILIRUB SERPL-MCNC: 0.7 MG/DL (ref 0.2–1)
BILIRUBIN DIRECT+TOT PNL SERPL-MCNC: 0.1 MG/DL (ref 0–0.5)
BILIRUBIN DIRECT+TOT PNL SERPL-MCNC: 0.6 MG/DL (ref 0–0.8)
BUN SERPL-MCNC: 16 MG/DL (ref 7–18)
CALCIUM SERPL-MCNC: 8.6 MG/DL (ref 8.5–10.1)
CHLORIDE SERPL-SCNC: 107 MMOL/L (ref 98–107)
CO2 SERPL-SCNC: 31 MMOL/L (ref 21–32)
CREAT SERPL-MCNC: 0.9 MG/DL (ref 0.7–1.3)
EOSINOPHIL # BLD AUTO: 0 X10(3)/MCL (ref 0–0.9)
EOSINOPHIL NFR BLD AUTO: 0.1 %
ERYTHROCYTE [DISTWIDTH] IN BLOOD BY AUTOMATED COUNT: 16.2 % (ref 11.5–17)
GLOBULIN SER-MCNC: 2.6 GM/DL (ref 2.4–3.5)
GLUCOSE SERPL-MCNC: 103 MG/DL (ref 74–106)
HCT VFR BLD AUTO: 33.3 % (ref 42–52)
HGB BLD-MCNC: 10.6 GM/DL (ref 14–18)
LYMPHOCYTES # BLD AUTO: 0.4 X10(3)/MCL (ref 0.6–4.6)
LYMPHOCYTES NFR BLD AUTO: 1.7 %
MCH RBC QN AUTO: 30.5 PG (ref 27–31)
MCHC RBC AUTO-ENTMCNC: 31.8 GM/DL (ref 33–36)
MCV RBC AUTO: 96 FL (ref 80–94)
MONOCYTES # BLD AUTO: 0 X10(3)/MCL (ref 0.1–1.3)
MONOCYTES NFR BLD AUTO: 0.1 %
NEUTROPHILS # BLD AUTO: 19.9 X10(3)/MCL (ref 2.1–9.2)
NEUTROPHILS NFR BLD AUTO: 98.1 %
PLATELET # BLD AUTO: 116 X10(3)/MCL (ref 130–400)
PMV BLD AUTO: 8.4 FL (ref 9.4–12.4)
POTASSIUM SERPL-SCNC: 4.2 MMOL/L (ref 3.5–5.1)
PROT SERPL-MCNC: 5.9 GM/DL (ref 6.4–8.2)
RBC # BLD AUTO: 3.47 X10(6)/MCL (ref 4.7–6.1)
SODIUM SERPL-SCNC: 145 MMOL/L (ref 136–145)
WBC # SPEC AUTO: 20.3 X10(3)/MCL (ref 4.5–11.5)

## 2017-10-23 ENCOUNTER — HISTORICAL (OUTPATIENT)
Dept: HEMATOLOGY/ONCOLOGY | Facility: CLINIC | Age: 67
End: 2017-10-23

## 2017-10-23 LAB
ABS NEUT (OLG): 4.67 X10(3)/MCL (ref 2.1–9.2)
ANION GAP SERPL CALC-SCNC: 15 MMOL/L
BASOPHILS # BLD AUTO: 0 X10(3)/MCL (ref 0–0.2)
BASOPHILS NFR BLD AUTO: 0.3 %
BUN SERPL-MCNC: 7 MG/DL (ref 7–18)
CHLORIDE SERPL-SCNC: 105 MMOL/L (ref 98–109)
CREAT SERPL-MCNC: 0.8 MG/DL (ref 0.6–1.3)
EOSINOPHIL # BLD AUTO: 0.1 X10(3)/MCL (ref 0–0.9)
EOSINOPHIL NFR BLD AUTO: 1 %
ERYTHROCYTE [DISTWIDTH] IN BLOOD BY AUTOMATED COUNT: 15.7 % (ref 11.5–17)
GLUCOSE SERPL-MCNC: 153 MG/DL (ref 70–105)
HCT VFR BLD AUTO: 29.9 % (ref 42–52)
HCT VFR BLD CALC: 28 % (ref 38–51)
HGB BLD-MCNC: 9.5 MG/DL (ref 12–17)
HGB BLD-MCNC: 9.6 GM/DL (ref 14–18)
LYMPHOCYTES # BLD AUTO: 0.7 X10(3)/MCL (ref 0.6–4.6)
LYMPHOCYTES NFR BLD AUTO: 10.9 %
MCH RBC QN AUTO: 31 PG (ref 27–31)
MCHC RBC AUTO-ENTMCNC: 32.1 GM/DL (ref 33–36)
MCV RBC AUTO: 96.5 FL (ref 80–94)
MONOCYTES # BLD AUTO: 0.7 X10(3)/MCL (ref 0.1–1.3)
MONOCYTES NFR BLD AUTO: 11.4 %
NEUTROPHILS # BLD AUTO: 4.7 X10(3)/MCL (ref 2.1–9.2)
NEUTROPHILS NFR BLD AUTO: 76.4 %
PLATELET # BLD AUTO: 133 X10(3)/MCL (ref 130–400)
PMV BLD AUTO: 9.2 FL (ref 9.4–12.4)
POC IONIZED CALCIUM: 1.2 MMOL/L (ref 1.12–1.32)
POC TCO2: 27 MMOL/L (ref 22–27)
POTASSIUM BLD-SCNC: 4.4 MMOL/L (ref 3.5–4.9)
RBC # BLD AUTO: 3.1 X10(6)/MCL (ref 4.7–6.1)
SODIUM BLD-SCNC: 142 MMOL/L (ref 138–146)
WBC # SPEC AUTO: 6.1 X10(3)/MCL (ref 4.5–11.5)

## 2017-11-03 ENCOUNTER — HISTORICAL (OUTPATIENT)
Dept: HEMATOLOGY/ONCOLOGY | Facility: CLINIC | Age: 67
End: 2017-11-03

## 2017-11-03 LAB
ABS NEUT (OLG): 1.77 X10(3)/MCL (ref 2.1–9.2)
ALBUMIN SERPL-MCNC: 3.6 GM/DL (ref 3.4–5)
ALBUMIN/GLOB SERPL: 1.3 {RATIO}
ALP SERPL-CCNC: 54 UNIT/L (ref 50–136)
ALT SERPL-CCNC: 21 UNIT/L (ref 12–78)
AST SERPL-CCNC: 14 UNIT/L (ref 15–37)
BASOPHILS # BLD AUTO: 0 X10(3)/MCL (ref 0–0.2)
BASOPHILS NFR BLD AUTO: 1.4 %
BILIRUB SERPL-MCNC: 0.5 MG/DL (ref 0.2–1)
BILIRUBIN DIRECT+TOT PNL SERPL-MCNC: 0.1 MG/DL (ref 0–0.2)
BILIRUBIN DIRECT+TOT PNL SERPL-MCNC: 0.4 MG/DL (ref 0–0.8)
BUN SERPL-MCNC: 12 MG/DL (ref 7–18)
CALCIUM SERPL-MCNC: 8.5 MG/DL (ref 8.5–10.1)
CHLORIDE SERPL-SCNC: 105 MMOL/L (ref 98–107)
CO2 SERPL-SCNC: 28 MMOL/L (ref 21–32)
CREAT SERPL-MCNC: 0.84 MG/DL (ref 0.7–1.3)
EOSINOPHIL # BLD AUTO: 0 X10(3)/MCL (ref 0–0.9)
EOSINOPHIL NFR BLD AUTO: 1.4 %
ERYTHROCYTE [DISTWIDTH] IN BLOOD BY AUTOMATED COUNT: 17.3 % (ref 11.5–17)
GLOBULIN SER-MCNC: 2.8 GM/DL (ref 2.4–3.5)
GLUCOSE SERPL-MCNC: 133 MG/DL (ref 74–106)
HCT VFR BLD AUTO: 33.6 % (ref 42–52)
HGB BLD-MCNC: 10.7 GM/DL (ref 14–18)
LYMPHOCYTES # BLD AUTO: 0.9 X10(3)/MCL (ref 0.6–4.6)
LYMPHOCYTES NFR BLD AUTO: 25.7 %
MCH RBC QN AUTO: 31.8 PG (ref 27–31)
MCHC RBC AUTO-ENTMCNC: 31.8 GM/DL (ref 33–36)
MCV RBC AUTO: 99.7 FL (ref 80–94)
MONOCYTES # BLD AUTO: 0.7 X10(3)/MCL (ref 0.1–1.3)
MONOCYTES NFR BLD AUTO: 20.9 %
NEUTROPHILS # BLD AUTO: 1.8 X10(3)/MCL (ref 2.1–9.2)
NEUTROPHILS NFR BLD AUTO: 50.6 %
PLATELET # BLD AUTO: 153 X10(3)/MCL (ref 130–400)
PMV BLD AUTO: 9.3 FL (ref 9.4–12.4)
POTASSIUM SERPL-SCNC: 5 MMOL/L (ref 3.5–5.1)
PROT SERPL-MCNC: 6.4 GM/DL (ref 6.4–8.2)
RBC # BLD AUTO: 3.37 X10(6)/MCL (ref 4.7–6.1)
SODIUM SERPL-SCNC: 141 MMOL/L (ref 136–145)
WBC # SPEC AUTO: 3.5 X10(3)/MCL (ref 4.5–11.5)

## 2017-12-11 ENCOUNTER — HISTORICAL (OUTPATIENT)
Dept: HEMATOLOGY/ONCOLOGY | Facility: CLINIC | Age: 67
End: 2017-12-11

## 2017-12-11 LAB
ABS NEUT (OLG): 1.94 X10(3)/MCL (ref 2.1–9.2)
ALBUMIN SERPL-MCNC: 4.4 GM/DL (ref 3.4–5)
ALBUMIN/GLOB SERPL: 1.4 {RATIO}
ALP SERPL-CCNC: 70 UNIT/L (ref 50–136)
ALT SERPL-CCNC: 26 UNIT/L (ref 12–78)
AST SERPL-CCNC: 15 UNIT/L (ref 15–37)
BASOPHILS # BLD AUTO: 0 X10(3)/MCL (ref 0–0.2)
BASOPHILS NFR BLD AUTO: 0.4 %
BILIRUB SERPL-MCNC: 0.6 MG/DL (ref 0.2–1)
BILIRUBIN DIRECT+TOT PNL SERPL-MCNC: 0.1 MG/DL (ref 0–0.2)
BILIRUBIN DIRECT+TOT PNL SERPL-MCNC: 0.5 MG/DL (ref 0–0.8)
BUN SERPL-MCNC: 20 MG/DL (ref 7–18)
CALCIUM SERPL-MCNC: 9.4 MG/DL (ref 8.5–10.1)
CHLORIDE SERPL-SCNC: 103 MMOL/L (ref 98–107)
CO2 SERPL-SCNC: 27 MMOL/L (ref 21–32)
CREAT SERPL-MCNC: 1.1 MG/DL (ref 0.7–1.3)
EOSINOPHIL # BLD AUTO: 0.3 X10(3)/MCL (ref 0–0.9)
EOSINOPHIL NFR BLD AUTO: 5.2 %
ERYTHROCYTE [DISTWIDTH] IN BLOOD BY AUTOMATED COUNT: 13.5 % (ref 11.5–17)
GLOBULIN SER-MCNC: 3.1 GM/DL (ref 2.4–3.5)
GLUCOSE SERPL-MCNC: 114 MG/DL (ref 74–106)
HCT VFR BLD AUTO: 40.9 % (ref 42–52)
HGB BLD-MCNC: 13.5 GM/DL (ref 14–18)
LYMPHOCYTES # BLD AUTO: 2.4 X10(3)/MCL (ref 0.6–4.6)
LYMPHOCYTES NFR BLD AUTO: 46.6 %
MCH RBC QN AUTO: 30.1 PG (ref 27–31)
MCHC RBC AUTO-ENTMCNC: 33 GM/DL (ref 33–36)
MCV RBC AUTO: 91.1 FL (ref 80–94)
MONOCYTES # BLD AUTO: 0.5 X10(3)/MCL (ref 0.1–1.3)
MONOCYTES NFR BLD AUTO: 10.1 %
NEUTROPHILS # BLD AUTO: 1.9 X10(3)/MCL (ref 2.1–9.2)
NEUTROPHILS NFR BLD AUTO: 37.7 %
PLATELET # BLD AUTO: 145 X10(3)/MCL (ref 130–400)
PMV BLD AUTO: 9 FL (ref 9.4–12.4)
POTASSIUM SERPL-SCNC: 4.9 MMOL/L (ref 3.5–5.1)
PROT SERPL-MCNC: 7.5 GM/DL (ref 6.4–8.2)
RBC # BLD AUTO: 4.49 X10(6)/MCL (ref 4.7–6.1)
SODIUM SERPL-SCNC: 139 MMOL/L (ref 136–145)
WBC # SPEC AUTO: 5.2 X10(3)/MCL (ref 4.5–11.5)

## 2018-01-19 ENCOUNTER — HISTORICAL (OUTPATIENT)
Dept: HEMATOLOGY/ONCOLOGY | Facility: CLINIC | Age: 68
End: 2018-01-19

## 2018-01-19 LAB
ABS NEUT (OLG): 2.29 X10(3)/MCL (ref 2.1–9.2)
ALBUMIN SERPL-MCNC: 3.9 GM/DL (ref 3.4–5)
ALBUMIN/GLOB SERPL: 1.2 {RATIO}
ALP SERPL-CCNC: 67 UNIT/L (ref 50–136)
ALT SERPL-CCNC: 22 UNIT/L (ref 12–78)
AST SERPL-CCNC: 11 UNIT/L (ref 15–37)
BASOPHILS # BLD AUTO: 0 X10(3)/MCL (ref 0–0.2)
BASOPHILS NFR BLD AUTO: 0.2 %
BILIRUB SERPL-MCNC: 0.5 MG/DL (ref 0.2–1)
BILIRUBIN DIRECT+TOT PNL SERPL-MCNC: 0.1 MG/DL (ref 0–0.2)
BILIRUBIN DIRECT+TOT PNL SERPL-MCNC: 0.4 MG/DL (ref 0–0.8)
BUN SERPL-MCNC: 21 MG/DL (ref 7–18)
CALCIUM SERPL-MCNC: 8.7 MG/DL (ref 8.5–10.1)
CHLORIDE SERPL-SCNC: 103 MMOL/L (ref 98–107)
CO2 SERPL-SCNC: 29 MMOL/L (ref 21–32)
CREAT SERPL-MCNC: 1.21 MG/DL (ref 0.7–1.3)
EOSINOPHIL # BLD AUTO: 0.1 X10(3)/MCL (ref 0–0.9)
EOSINOPHIL NFR BLD AUTO: 2.1 %
ERYTHROCYTE [DISTWIDTH] IN BLOOD BY AUTOMATED COUNT: 13.9 % (ref 11.5–17)
GLOBULIN SER-MCNC: 3.2 GM/DL (ref 2.4–3.5)
GLUCOSE SERPL-MCNC: 132 MG/DL (ref 74–106)
HCT VFR BLD AUTO: 41.4 % (ref 42–52)
HGB BLD-MCNC: 13.3 GM/DL (ref 14–18)
LDH SERPL-CCNC: 131 UNIT/L (ref 87–241)
LYMPHOCYTES # BLD AUTO: 1.9 X10(3)/MCL (ref 0.6–4.6)
LYMPHOCYTES NFR BLD AUTO: 39.8 %
MCH RBC QN AUTO: 29.2 PG (ref 27–31)
MCHC RBC AUTO-ENTMCNC: 32.1 GM/DL (ref 33–36)
MCV RBC AUTO: 90.8 FL (ref 80–94)
MONOCYTES # BLD AUTO: 0.5 X10(3)/MCL (ref 0.1–1.3)
MONOCYTES NFR BLD AUTO: 10.7 %
NEUTROPHILS # BLD AUTO: 2.3 X10(3)/MCL (ref 2.1–9.2)
NEUTROPHILS NFR BLD AUTO: 47.2 %
PLATELET # BLD AUTO: 132 X10(3)/MCL (ref 130–400)
PMV BLD AUTO: 8.6 FL (ref 9.4–12.4)
POTASSIUM SERPL-SCNC: 5 MMOL/L (ref 3.5–5.1)
PROT SERPL-MCNC: 7.1 GM/DL (ref 6.4–8.2)
RBC # BLD AUTO: 4.56 X10(6)/MCL (ref 4.7–6.1)
SODIUM SERPL-SCNC: 139 MMOL/L (ref 136–145)
WBC # SPEC AUTO: 4.8 X10(3)/MCL (ref 4.5–11.5)

## 2020-09-02 ENCOUNTER — HISTORICAL (OUTPATIENT)
Dept: PREADMISSION TESTING | Facility: HOSPITAL | Age: 70
End: 2020-09-02

## 2020-09-02 LAB
ABS NEUT (OLG): 2.05 X10(3)/MCL (ref 2.1–9.2)
ALBUMIN SERPL-MCNC: 4.1 GM/DL (ref 3.4–5)
ALBUMIN/GLOB SERPL: 1.5 RATIO (ref 1.1–2)
ALP SERPL-CCNC: 41 UNIT/L (ref 40–150)
ALT SERPL-CCNC: 40 UNIT/L (ref 0–55)
APTT PPP: 29.9 SECOND(S) (ref 23.2–33.7)
AST SERPL-CCNC: 36 UNIT/L (ref 5–34)
BASOPHILS # BLD AUTO: 0 X10(3)/MCL (ref 0–0.2)
BASOPHILS NFR BLD AUTO: 0 %
BILIRUB SERPL-MCNC: 0.5 MG/DL
BILIRUBIN DIRECT+TOT PNL SERPL-MCNC: 0.1 MG/DL (ref 0–0.5)
BILIRUBIN DIRECT+TOT PNL SERPL-MCNC: 0.4 MG/DL (ref 0–0.8)
BUN SERPL-MCNC: 19.2 MG/DL (ref 8.4–25.7)
CALCIUM SERPL-MCNC: 8.7 MG/DL (ref 8.8–10)
CHLORIDE SERPL-SCNC: 104 MMOL/L (ref 98–107)
CO2 SERPL-SCNC: 24 MMOL/L (ref 23–31)
CREAT SERPL-MCNC: 1.29 MG/DL (ref 0.73–1.18)
EOSINOPHIL # BLD AUTO: 0.2 X10(3)/MCL (ref 0–0.9)
EOSINOPHIL NFR BLD AUTO: 4 %
ERYTHROCYTE [DISTWIDTH] IN BLOOD BY AUTOMATED COUNT: 13.1 % (ref 11.5–17)
GLOBULIN SER-MCNC: 2.8 GM/DL (ref 2.4–3.5)
GLUCOSE SERPL-MCNC: 237 MG/DL (ref 82–115)
HCT VFR BLD AUTO: 43.5 % (ref 42–52)
HGB BLD-MCNC: 14.7 GM/DL (ref 14–18)
INR PPP: 1 (ref 0–1.3)
LYMPHOCYTES # BLD AUTO: 1.6 X10(3)/MCL (ref 0.6–4.6)
LYMPHOCYTES NFR BLD AUTO: 38 %
MCH RBC QN AUTO: 31.5 PG (ref 27–31)
MCHC RBC AUTO-ENTMCNC: 33.8 GM/DL (ref 33–36)
MCV RBC AUTO: 93.3 FL (ref 80–94)
MONOCYTES # BLD AUTO: 0.4 X10(3)/MCL (ref 0.1–1.3)
MONOCYTES NFR BLD AUTO: 10 %
NEUTROPHILS # BLD AUTO: 2.05 X10(3)/MCL (ref 2.1–9.2)
NEUTROPHILS NFR BLD AUTO: 48 %
PLATELET # BLD AUTO: 135 X10(3)/MCL (ref 130–400)
PMV BLD AUTO: 9.2 FL (ref 9.4–12.4)
POTASSIUM SERPL-SCNC: 5.1 MMOL/L (ref 3.5–5.1)
PROT SERPL-MCNC: 6.9 GM/DL (ref 5.8–7.6)
PROTHROMBIN TIME: 12.7 SECOND(S) (ref 11.1–13.7)
RBC # BLD AUTO: 4.66 X10(6)/MCL (ref 4.7–6.1)
SODIUM SERPL-SCNC: 139 MMOL/L (ref 136–145)
WBC # SPEC AUTO: 4.3 X10(3)/MCL (ref 4.5–11.5)

## 2020-09-10 ENCOUNTER — HISTORICAL (OUTPATIENT)
Dept: SURGERY | Facility: HOSPITAL | Age: 70
End: 2020-09-10

## 2020-12-29 ENCOUNTER — HISTORICAL (OUTPATIENT)
Dept: ENDOSCOPY | Facility: HOSPITAL | Age: 70
End: 2020-12-29

## 2021-05-24 ENCOUNTER — HISTORICAL (OUTPATIENT)
Dept: ADMINISTRATIVE | Facility: HOSPITAL | Age: 71
End: 2021-05-24

## 2021-05-24 LAB
APPEARANCE, UA: CLEAR
BACTERIA SPEC CULT: ABNORMAL /HPF
BILIRUB UR QL STRIP: NEGATIVE
BUN SERPL-MCNC: 21.9 MG/DL (ref 8.4–25.7)
CALCIUM SERPL-MCNC: 10 MG/DL (ref 8.8–10)
CHLORIDE SERPL-SCNC: 101 MMOL/L (ref 98–107)
CO2 SERPL-SCNC: 26 MMOL/L (ref 23–31)
COLOR UR: YELLOW
CREAT SERPL-MCNC: 1.38 MG/DL (ref 0.73–1.18)
CREAT UR-MCNC: 47.6 MG/DL (ref 58–161)
CREAT/UREA NIT SERPL: 16
EST. AVERAGE GLUCOSE BLD GHB EST-MCNC: 182.9 MG/DL
GLUCOSE (UA): NEGATIVE
GLUCOSE SERPL-MCNC: 140 MG/DL (ref 82–115)
HBA1C MFR BLD: 8 %
HGB UR QL STRIP: NEGATIVE
KETONES UR QL STRIP: ABNORMAL
LEUKOCYTE ESTERASE UR QL STRIP: NEGATIVE
MICROALBUMIN UR-MCNC: <5 UG/ML
MICROALBUMIN/CREAT RATIO PNL UR: <10.5 MG/GM CR (ref 0–30)
NITRITE UR QL STRIP: NEGATIVE
PH UR STRIP: 6 [PH] (ref 5–9)
POTASSIUM SERPL-SCNC: 4.2 MMOL/L (ref 3.5–5.1)
PROT UR QL STRIP: NEGATIVE
RBC #/AREA URNS HPF: ABNORMAL /[HPF]
SODIUM SERPL-SCNC: 141 MMOL/L (ref 136–145)
SP GR UR STRIP: 1.01 (ref 1–1.03)
SQUAMOUS EPITHELIAL, UA: ABNORMAL /HPF (ref 0–4)
UROBILINOGEN UR STRIP-ACNC: 0.2
WBC #/AREA URNS HPF: ABNORMAL /HPF

## 2021-06-30 ENCOUNTER — HISTORICAL (OUTPATIENT)
Dept: RADIOLOGY | Facility: HOSPITAL | Age: 71
End: 2021-06-30

## 2021-08-10 ENCOUNTER — HISTORICAL (OUTPATIENT)
Dept: ADMINISTRATIVE | Facility: HOSPITAL | Age: 71
End: 2021-08-10

## 2021-08-10 LAB
ABS NEUT (OLG): 1.84 X10(3)/MCL (ref 2.1–9.2)
ALBUMIN SERPL-MCNC: 4.2 GM/DL (ref 3.4–4.8)
ALBUMIN/GLOB SERPL: 1.4 RATIO (ref 1.1–2)
ALP SERPL-CCNC: 47 UNIT/L (ref 40–150)
ALT SERPL-CCNC: 15 UNIT/L (ref 0–55)
APPEARANCE, UA: CLEAR
AST SERPL-CCNC: 16 UNIT/L (ref 5–34)
BACTERIA SPEC CULT: NORMAL /HPF
BASOPHILS # BLD AUTO: 0 X10(3)/MCL (ref 0–0.2)
BASOPHILS NFR BLD AUTO: 1 %
BILIRUB SERPL-MCNC: 0.8 MG/DL
BILIRUB UR QL STRIP: NEGATIVE
BILIRUBIN DIRECT+TOT PNL SERPL-MCNC: 0.3 MG/DL (ref 0–0.5)
BILIRUBIN DIRECT+TOT PNL SERPL-MCNC: 0.5 MG/DL (ref 0–0.8)
BUN SERPL-MCNC: 21.2 MG/DL (ref 8.4–25.7)
CALCIUM SERPL-MCNC: 9.2 MG/DL (ref 8.8–10)
CHLORIDE SERPL-SCNC: 107 MMOL/L (ref 98–107)
CHOLEST SERPL-MCNC: 174 MG/DL
CHOLEST/HDLC SERPL: 4 {RATIO} (ref 0–5)
CO2 SERPL-SCNC: 26 MMOL/L (ref 23–31)
COLOR UR: YELLOW
CREAT SERPL-MCNC: 1.32 MG/DL (ref 0.73–1.18)
CREAT UR-MCNC: 84.4 MG/DL (ref 58–161)
EOSINOPHIL # BLD AUTO: 0.1 X10(3)/MCL (ref 0–0.9)
EOSINOPHIL NFR BLD AUTO: 3 %
ERYTHROCYTE [DISTWIDTH] IN BLOOD BY AUTOMATED COUNT: 12.9 % (ref 11.5–17)
EST. AVERAGE GLUCOSE BLD GHB EST-MCNC: 134.1 MG/DL
GLOBULIN SER-MCNC: 2.9 GM/DL (ref 2.4–3.5)
GLUCOSE (UA): NEGATIVE
GLUCOSE SERPL-MCNC: 112 MG/DL (ref 82–115)
HBA1C MFR BLD: 6.3 %
HCT VFR BLD AUTO: 45.5 % (ref 42–52)
HDLC SERPL-MCNC: 43 MG/DL (ref 35–60)
HGB BLD-MCNC: 14.8 GM/DL (ref 14–18)
HGB UR QL STRIP: NEGATIVE
KETONES UR QL STRIP: NEGATIVE
LDLC SERPL CALC-MCNC: 107 MG/DL (ref 50–140)
LEUKOCYTE ESTERASE UR QL STRIP: NEGATIVE
LYMPHOCYTES # BLD AUTO: 1.8 X10(3)/MCL (ref 0.6–4.6)
LYMPHOCYTES NFR BLD AUTO: 42 %
MCH RBC QN AUTO: 30.7 PG (ref 27–31)
MCHC RBC AUTO-ENTMCNC: 32.5 GM/DL (ref 33–36)
MCV RBC AUTO: 94.4 FL (ref 80–94)
MICROALBUMIN UR-MCNC: 5.3 UG/ML
MICROALBUMIN/CREAT RATIO PNL UR: 6.3 MG/GM CR (ref 0–30)
MONOCYTES # BLD AUTO: 0.4 X10(3)/MCL (ref 0.1–1.3)
MONOCYTES NFR BLD AUTO: 10 %
NEUTROPHILS # BLD AUTO: 1.84 X10(3)/MCL (ref 2.1–9.2)
NEUTROPHILS NFR BLD AUTO: 44 %
NITRITE UR QL STRIP: NEGATIVE
PH UR STRIP: 5.5 [PH] (ref 5–9)
PLATELET # BLD AUTO: 125 X10(3)/MCL (ref 130–400)
PMV BLD AUTO: 9.9 FL (ref 9.4–12.4)
POTASSIUM SERPL-SCNC: 5.2 MMOL/L (ref 3.5–5.1)
PROT SERPL-MCNC: 7.1 GM/DL (ref 5.8–7.6)
PROT UR QL STRIP: NEGATIVE
PSA SERPL-MCNC: 2.83 NG/ML
RBC # BLD AUTO: 4.82 X10(6)/MCL (ref 4.7–6.1)
RBC #/AREA URNS HPF: NORMAL /[HPF]
SODIUM SERPL-SCNC: 144 MMOL/L (ref 136–145)
SP GR UR STRIP: 1.02 (ref 1–1.03)
SQUAMOUS EPITHELIAL, UA: NORMAL /HPF (ref 0–4)
TRIGL SERPL-MCNC: 118 MG/DL (ref 34–140)
TSH SERPL-ACNC: 0.63 UIU/ML (ref 0.35–4.94)
UROBILINOGEN UR STRIP-ACNC: 0.2
VLDLC SERPL CALC-MCNC: 24 MG/DL
WBC # SPEC AUTO: 4.2 X10(3)/MCL (ref 4.5–11.5)
WBC #/AREA URNS HPF: NORMAL /HPF

## 2021-08-20 LAB
LEFT EYE DM RETINOPATHY: NEGATIVE
RIGHT EYE DM RETINOPATHY: NEGATIVE

## 2021-10-27 ENCOUNTER — HISTORICAL (OUTPATIENT)
Dept: ADMINISTRATIVE | Facility: HOSPITAL | Age: 71
End: 2021-10-27

## 2021-10-27 LAB
ABS NEUT (OLG): 2.07 X10(3)/MCL (ref 2.1–9.2)
ALBUMIN SERPL-MCNC: 3.9 GM/DL (ref 3.4–4.8)
ALBUMIN/GLOB SERPL: 1.3 RATIO (ref 1.1–2)
ALP SERPL-CCNC: 48 UNIT/L (ref 40–150)
ALT SERPL-CCNC: 16 UNIT/L (ref 0–55)
AST SERPL-CCNC: 14 UNIT/L (ref 5–34)
BASOPHILS # BLD AUTO: 0 X10(3)/MCL (ref 0–0.2)
BASOPHILS NFR BLD AUTO: 0 %
BILIRUB SERPL-MCNC: 0.5 MG/DL
BILIRUBIN DIRECT+TOT PNL SERPL-MCNC: 0.2 MG/DL (ref 0–0.5)
BILIRUBIN DIRECT+TOT PNL SERPL-MCNC: 0.3 MG/DL (ref 0–0.8)
BUN SERPL-MCNC: 18 MG/DL (ref 8.4–25.7)
CALCIUM SERPL-MCNC: 9.4 MG/DL (ref 8.7–10.5)
CHLORIDE SERPL-SCNC: 109 MMOL/L (ref 98–107)
CO2 SERPL-SCNC: 26 MMOL/L (ref 23–31)
CREAT SERPL-MCNC: 1.16 MG/DL (ref 0.73–1.18)
EOSINOPHIL # BLD AUTO: 0.1 X10(3)/MCL (ref 0–0.9)
EOSINOPHIL NFR BLD AUTO: 3 %
ERYTHROCYTE [DISTWIDTH] IN BLOOD BY AUTOMATED COUNT: 13.6 % (ref 11.5–17)
EST. AVERAGE GLUCOSE BLD GHB EST-MCNC: 137 MG/DL
GLOBULIN SER-MCNC: 3 GM/DL (ref 2.4–3.5)
GLUCOSE SERPL-MCNC: 136 MG/DL (ref 82–115)
HBA1C MFR BLD: 6.4 %
HCT VFR BLD AUTO: 46.5 % (ref 42–52)
HGB BLD-MCNC: 14.8 GM/DL (ref 14–18)
LYMPHOCYTES # BLD AUTO: 1.6 X10(3)/MCL (ref 0.6–4.6)
LYMPHOCYTES NFR BLD AUTO: 39 %
MCH RBC QN AUTO: 30.1 PG (ref 27–31)
MCHC RBC AUTO-ENTMCNC: 31.8 GM/DL (ref 33–36)
MCV RBC AUTO: 94.7 FL (ref 80–94)
MONOCYTES # BLD AUTO: 0.4 X10(3)/MCL (ref 0.1–1.3)
MONOCYTES NFR BLD AUTO: 9 %
NEUTROPHILS # BLD AUTO: 2.07 X10(3)/MCL (ref 2.1–9.2)
NEUTROPHILS NFR BLD AUTO: 49 %
PLATELET # BLD AUTO: 129 X10(3)/MCL (ref 130–400)
PMV BLD AUTO: 9.7 FL (ref 9.4–12.4)
POTASSIUM SERPL-SCNC: 5.4 MMOL/L (ref 3.5–5.1)
PROT SERPL-MCNC: 6.9 GM/DL (ref 5.8–7.6)
RBC # BLD AUTO: 4.91 X10(6)/MCL (ref 4.7–6.1)
SODIUM SERPL-SCNC: 143 MMOL/L (ref 136–145)
WBC # SPEC AUTO: 4.3 X10(3)/MCL (ref 4.5–11.5)

## 2021-11-03 ENCOUNTER — HISTORICAL (OUTPATIENT)
Dept: ADMINISTRATIVE | Facility: HOSPITAL | Age: 71
End: 2021-11-03

## 2021-11-29 ENCOUNTER — HISTORICAL (OUTPATIENT)
Dept: PREADMISSION TESTING | Facility: HOSPITAL | Age: 71
End: 2021-11-29

## 2021-11-29 LAB
ABS NEUT (OLG): 2 X10(3)/MCL (ref 2.1–9.2)
ALBUMIN SERPL-MCNC: 4 GM/DL (ref 3.4–4.8)
ALBUMIN/GLOB SERPL: 1.4 RATIO (ref 1.1–2)
ALP SERPL-CCNC: 41 UNIT/L (ref 40–150)
ALT SERPL-CCNC: 16 UNIT/L (ref 0–55)
APTT PPP: 32.4 SECOND(S) (ref 23.2–33.7)
AST SERPL-CCNC: 13 UNIT/L (ref 5–34)
BASOPHILS # BLD AUTO: 0 X10(3)/MCL (ref 0–0.2)
BASOPHILS NFR BLD AUTO: 1 %
BILIRUB SERPL-MCNC: 0.3 MG/DL
BILIRUBIN DIRECT+TOT PNL SERPL-MCNC: 0.1 MG/DL (ref 0–0.5)
BILIRUBIN DIRECT+TOT PNL SERPL-MCNC: 0.2 MG/DL (ref 0–0.8)
BUN SERPL-MCNC: 15.7 MG/DL (ref 8.4–25.7)
CALCIUM SERPL-MCNC: 8.8 MG/DL (ref 8.7–10.5)
CHLORIDE SERPL-SCNC: 108 MMOL/L (ref 98–107)
CO2 SERPL-SCNC: 27 MMOL/L (ref 23–31)
CREAT SERPL-MCNC: 1.15 MG/DL (ref 0.73–1.18)
EOSINOPHIL # BLD AUTO: 0.2 X10(3)/MCL (ref 0–0.9)
EOSINOPHIL NFR BLD AUTO: 4 %
ERYTHROCYTE [DISTWIDTH] IN BLOOD BY AUTOMATED COUNT: 13.3 % (ref 11.5–17)
GLOBULIN SER-MCNC: 2.8 GM/DL (ref 2.4–3.5)
GLUCOSE SERPL-MCNC: 151 MG/DL (ref 82–115)
HCT VFR BLD AUTO: 45.1 % (ref 42–52)
HGB BLD-MCNC: 14.8 GM/DL (ref 14–18)
INR PPP: 1 (ref 0–1.3)
LYMPHOCYTES # BLD AUTO: 1.6 X10(3)/MCL (ref 0.6–4.6)
LYMPHOCYTES NFR BLD AUTO: 38 %
MCH RBC QN AUTO: 30.9 PG (ref 27–31)
MCHC RBC AUTO-ENTMCNC: 32.8 GM/DL (ref 33–36)
MCV RBC AUTO: 94.2 FL (ref 80–94)
MONOCYTES # BLD AUTO: 0.4 X10(3)/MCL (ref 0.1–1.3)
MONOCYTES NFR BLD AUTO: 9 %
NEUTROPHILS # BLD AUTO: 2 X10(3)/MCL (ref 2.1–9.2)
NEUTROPHILS NFR BLD AUTO: 48 %
PLATELET # BLD AUTO: 143 X10(3)/MCL (ref 130–400)
PMV BLD AUTO: 9.6 FL (ref 9.4–12.4)
POTASSIUM SERPL-SCNC: 5.5 MMOL/L (ref 3.5–5.1)
PROT SERPL-MCNC: 6.8 GM/DL (ref 5.8–7.6)
PROTHROMBIN TIME: 12.7 SECOND(S) (ref 12.5–14.5)
RBC # BLD AUTO: 4.79 X10(6)/MCL (ref 4.7–6.1)
SODIUM SERPL-SCNC: 141 MMOL/L (ref 136–145)
WBC # SPEC AUTO: 4.1 X10(3)/MCL (ref 4.5–11.5)

## 2021-12-02 ENCOUNTER — HISTORICAL (OUTPATIENT)
Dept: SURGERY | Facility: HOSPITAL | Age: 71
End: 2021-12-02

## 2021-12-02 LAB
ANION GAP SERPL CALC-SCNC: 15 MMOL/L
BUN SERPL-MCNC: 33 MG/DL (ref 8–26)
CHLORIDE SERPL-SCNC: 103 MMOL/L (ref 98–109)
CREAT SERPL-MCNC: 1.2 MG/DL (ref 0.6–1.3)
GLUCOSE SERPL-MCNC: 158 MG/DL (ref 70–105)
HCT VFR BLD CALC: 49 % (ref 38–51)
HGB BLD-MCNC: 16.7 MG/DL (ref 12–17)
POC IONIZED CALCIUM: 1.19 MMOL/L (ref 1.12–1.32)
POC TCO2: 27 MMOL/L (ref 24–29)
POTASSIUM BLD-SCNC: 5.3 MMOL/L (ref 3.5–4.9)
SODIUM BLD-SCNC: 139 MMOL/L (ref 138–146)

## 2021-12-09 ENCOUNTER — HISTORICAL (OUTPATIENT)
Dept: RADIATION THERAPY | Facility: HOSPITAL | Age: 71
End: 2021-12-09

## 2021-12-16 ENCOUNTER — HISTORICAL (OUTPATIENT)
Dept: RADIATION THERAPY | Facility: HOSPITAL | Age: 71
End: 2021-12-16

## 2022-02-04 ENCOUNTER — HISTORICAL (OUTPATIENT)
Dept: ADMINISTRATIVE | Facility: HOSPITAL | Age: 72
End: 2022-02-04

## 2022-02-04 LAB
APPEARANCE, UA: CLEAR
BACTERIA SPEC CULT: NORMAL
BILIRUB UR QL STRIP: NEGATIVE
BUDDING YEAST: NORMAL
BUN SERPL-MCNC: 22.2 MG/DL (ref 8.4–25.7)
CALCIUM SERPL-MCNC: 9.3 MG/DL (ref 8.7–10.5)
CASTS, UA: NORMAL
CHLORIDE SERPL-SCNC: 108 MMOL/L (ref 98–107)
CHOLEST SERPL-MCNC: 179 MG/DL
CHOLEST/HDLC SERPL: 4 {RATIO} (ref 0–5)
CO2 SERPL-SCNC: 24 MMOL/L (ref 23–31)
COLOR UR: YELLOW
CREAT SERPL-MCNC: 1.23 MG/DL (ref 0.73–1.18)
CREAT UR-MCNC: 62.2 MG/DL (ref 58–161)
CREAT/UREA NIT SERPL: 18
CRYSTALS: NORMAL
EST. AVERAGE GLUCOSE BLD GHB EST-MCNC: 145.6 MG/DL
GLUCOSE (UA): NEGATIVE
GLUCOSE SERPL-MCNC: 150 MG/DL (ref 82–115)
HBA1C MFR BLD: 6.7 %
HDLC SERPL-MCNC: 49 MG/DL (ref 35–60)
HEMOLYSIS INTERF INDEX SERPL-ACNC: 2
HGB UR QL STRIP: NEGATIVE
ICTERIC INTERF INDEX SERPL-ACNC: 1
KETONES UR QL STRIP: NEGATIVE
LDLC SERPL CALC-MCNC: 109 MG/DL (ref 50–140)
LEUKOCYTE ESTERASE UR QL STRIP: NEGATIVE
LIPEMIC INTERF INDEX SERPL-ACNC: 11
MICROALBUMIN UR-MCNC: <5
MICROALBUMIN/CREAT RATIO PNL UR: <8 (ref 0–30)
NITRITE UR QL STRIP: NEGATIVE
PH UR STRIP: 5.5 [PH] (ref 5–9)
POTASSIUM SERPL-SCNC: 4.5 MMOL/L (ref 3.5–5.1)
PROT UR QL STRIP: NEGATIVE
RBC #/AREA URNS HPF: NORMAL /[HPF] (ref 0–2)
SMALL ROUND CELLS, UA: NORMAL
SODIUM SERPL-SCNC: 140 MMOL/L (ref 136–145)
SP GR UR STRIP: 1.01 (ref 1–1.03)
SPERM URNS QL MICRO: NORMAL
SQUAMOUS EPITHELIAL, UA: NORMAL (ref 0–4)
TRIGL SERPL-MCNC: 104 MG/DL (ref 34–140)
UROBILINOGEN UR STRIP-ACNC: 0.2
VLDLC SERPL CALC-MCNC: 21 MG/DL
WBC #/AREA URNS HPF: NORMAL /[HPF] (ref 0–2)

## 2022-04-11 ENCOUNTER — HISTORICAL (OUTPATIENT)
Dept: ADMINISTRATIVE | Facility: HOSPITAL | Age: 72
End: 2022-04-11
Payer: COMMERCIAL

## 2022-04-24 VITALS
BODY MASS INDEX: 29 KG/M2 | OXYGEN SATURATION: 98 % | DIASTOLIC BLOOD PRESSURE: 82 MMHG | SYSTOLIC BLOOD PRESSURE: 120 MMHG | HEIGHT: 65 IN | WEIGHT: 174.06 LBS

## 2022-04-30 NOTE — OP NOTE
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  The dimensions of the resection removal were approximately 6 cm on the scalp and also 6 cm in the right hand.  He was diagnosed positive squamous cell carcinoma.    OPERATION PERFORMED:    1. Wide excision of right dorsal squamous cell carcinoma of right hand with advancement flap closure.   2. Full-thickness scalp resection with bilateral release and advancement flaps.   3. Full-thickness skin graft from the supraclavicular area.    REASON/INDICATION FOR SURGERY:  Mr. Son presented to me with a very invasive-looking, nodular, squamous cell at the junction of the frontoparietal area in the mid scalp area.  The lesion grossly measured approximately 2 cm peripherally.  It looked like he had some peripheral involvement up to 4 cm and it was very large and exophytic.  He also had a diffuse lesion on the dorsum of his hand which was all biopsy positive.    DESCRIPTION OF PROCEDURE:  After proper consent and information, the patient was brought to the operating room and placed on the operating room table in supine position.  After satisfactory endotracheal intubation and general anesthesia, patient was placed asleep.  The scalp was injected with approximately 5 cc of 2% xylocaine with epinephrine as well as the hand.  The hand was addressed first.  The area was elliptically excised vertically of approximately 6 to 8 cm in order to achieve closure.  It was undermined widely involving the whole dorsum of the hand and closed with a 4-0 Monocryl and a 4-0 nylon suture with complete, good closure.  A Burow triangle reconstruction was done at the distal and proximal component of the flap.  It closed very nicely.  At that point, attention was placed to the head.  The lesion was clinically much more involved than the expected component in  terms of adjacent skin changes.  It was elliptically excised horizontally.  It was approximately 5 to 6 cm vertically.  An elliptical incision was made around the lesion with a good negative clinical margin of approximately 0.8 mm and this was taken down to the periosteum of the scalp.  It was oriented with the pathologist.  The pathologist felt all the surgical margins were free and included the deep margins.  At that point, a releasing vertical elliptical excision was carried out on the right side, approximately 40% smaller in size, and this enabled the flap to rotate medially.  The whole scalp was elevated from the supra-auricular area on both sides all the way to the occiput of the scalp.  The scalp at that point still was not able to be closed.  Another vertical incision was made on the left lateral frontoparietal area just above the temporalis muscle up to the periosteum.  All this tissue was elevated completely.  The whole scalp was completely elevated.  At that point, the flaps could be closed without any tension.  The left side was closed with a 3-0 Monocryl and staple ligature.  Burow triangles were re-used to create the anterior and posterior component of the releasing incision.  On the right side, a supraclavicular graft was taken from the supraclavicular fossa on the right side.  This was defatted, debulked, placed into the defect, and closed without tension at that point.  Approximately 60% of the scalp reduction had to be closed with a skin graft because of the amount of tissue that had to be removed.  This was closed with a 4-0 chromic and a NasoPore bolster dressing.  He tolerated the procedure very well.  All surgical margins were reviewed with the pathologist.  He was transferred back to recovery room awake, alert, and responsive.        ______________________________  MD PAVEL Shelby/  DD:  12/13/2021  Time:  12:53PM  DT:  12/13/2021  Time:  04:23PM  Job #:  [id number]

## 2022-04-30 NOTE — OP NOTE
DATE OF SURGERY:    12/29/2020    SURGEON:  Apolinar Ocasio MD    PROCEDURE:  Esophagogastroduodenoscopy and biopsy.    PREOPERATIVE DIAGNOSIS:  Personal history of gastric B-cell lymphoma.    POSTOPERATIVE DIAGNOSES:    1. Adequate sedation with Diprivan per Anesthesia.  2. Relatively normal esophagoscopy.  3. Gastric erythema diffusely, with persistent but unchanged scarring evidenced in the antrum compatible with previous lymphoma.  Multiple biopsies obtained there for histopathology, but no active disease evident grossly.  4. Patent pylorus with normal appearing duodenum in the first and second portions.    PROCEDURE IN DETAIL:  The patient consented for the procedure after a detailed explanation of the risks and complications, including bleeding and perforation, as well as adverse reaction to sedation.     The patient was placed in the left lateral decubitus position.  A video endoscope was inserted in the oropharynx and passed under direct vision.  The hypopharynx and larynx were briefly examined and the esophagus entered.  After evaluation of the upper, mid and lower esophagus, the scope was passed into the stomach and retroflexed.  The cardia and fundus were evaluated.  The scope was de-retroflexed and we evaluated the body and antrum.     We traversed the pylorus, evaluating the first and second portions of the duodenum.  The scope was withdrawn and biopsies and specimens obtained as outlined below.  The scope was removed in its entirety and the patient tolerated the procedure well.     The patient's findings are as detailed.  He had gastric erythema and in particular had some evidence for erythema and scarring in the antrum as previously noted.  We did obtain multiple biopsies there for histopathology.  The duodenum seemed reassuring.    DISCUSSION AND DISCHARGE SUMMARY:  After procedure was completed, we discussed our findings with the patient and his attendant.  We examined him and found him  stable for discharge.  We allowed that he could resume his usual diet today and activities tomorrow.     He is to call if he has not heard on his biopsies within the week.  We encouraged him to get a primary care physician locally.  We will get his records to [facility] once he gets us a contact.  He will call us on an     as-needed basis down the line.  He might want to pursue a repeat exam in a year for reassurance, though he is quite certain he has followup with [facility] scheduled in the interim.        ______________________________  MD JORGE Cline/UY  DD:  12/29/2020  Time:  08:51AM  DT:  12/29/2020  Time:  09:05AM  Job #:  [id number]    cc: MD Apolinar Guajardo IV, MD

## 2022-04-30 NOTE — CONSULTS
DATE OF CONSULTATION:  12/29/2020    ATTENDING PHYSICIAN:  Dr. Apolinar Ocasio MD  CONSULTING PHYSICIAN:  Apolinar Ocasio MD    HISTORY:  Mr. Son is a 70-year-old known to me from previous evaluation.  He grew up in [place], friends with my cousin, Param Miranda.  He has a history of sizable adenomatous colon polyps, initially noted on screening in January 2013.  He had a reassuring repeat study in May of 2017, and we envisioned a repeat now in 2023.     In the summer of 2017, he developed epigastric pain and evidenced weight loss, and in May 2017, upper endoscopy found a malignant-appearing gastric ulceration.  He underwent exploration with node biopsy for what turned out to be a diffuse large B-cell lymphoma, C-MYC, BCL6 positive, and he was evaluated and treated through [facility] with R-EPOCH.  He also was found to have a left renal mass prompting a nephrectomy for renal cell carcinoma.  He lost profound weight, but regained it in the interim, and has maintained it over time.  He has remained asymptomatic from a GI standpoint.   He has undergone surveillance in November 2018, and last in December 2019.  He tends to have scarring in the antrum at the previous lymphoma site, and some evidence for gastritis with an otherwise reassuring exam.  Biopsies have been reassuring.  He is today scheduled for surveillance and denies gastrointestinal symptomatology.    ALLERGIES:  Darvocet, Demerol, and morphine.    MEDICATIONS:    1. Pravastatin.   2. Digoxin.   3. Zantac.   4. Cefdinir.   5. Losartan.   6. Multivitamin.    PAST MEDICAL HISTORY:  He has had back surgeries, neck surgeries, the nephrectomy, skin cancer surgery, arm surgery, and the chemotherapy.  In addition to the B-cell lymphoma, he has had some dysrhythmias, hyperlipidemia, and the adenomatous colon polyps along with sleep apnea.    SOCIAL HISTORY:   with healthy children.  He runs [employer].    FAMILY HISTORY:  Essentially negative.      PHYSICAL EXAMINATION:  VITAL SIGNS:  Afebrile, blood pressure 153/77, pulse 54, respirations 15.   HEART:  Regular.   LUNGS:  Clear.   ABDOMEN:  Soft and nontender without organomegaly or mass.   NEUROLOGICAL:  Alert and oriented.  Motor grossly intact.  He appears quite robust.    DISCUSSION:  A 70-year-old with a history of gastric lymphoma scheduled for surveillance.    PLAN:  Surveillance endoscopy with further recommendations to follow.        ______________________________  MD JORGE Cline/ANABEL  DD:  12/29/2020  Time:  08:33AM  DT:  12/29/2020  Time:  08:56AM  Job #:  [id number]    cc: MD Guzman Cline IV, MD

## 2022-04-30 NOTE — OP NOTE
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    OPERATION PERFORMED:  Wide excision with frozen section of squamous cell, with advancement flap closure.  The size of the defect was 9 x 7 cm.    PROCEDURE IN DETAIL:  With proper consent information, the patient was brought to the operating room, placed on the operating room table in supine position.  After satisfactory endotracheal intubation and general anesthesia, the patient was placed asleep.  The neck was prepped and draped in a sterile fashion.  The lesion was just above the anterior clavicular head.  The clavicular head on the right side was identified.  It was a very large, sclerotic, heavily scarred lesion, where defined margins could not be obtained, so with a good negative clinical margin of approximately 1 cm around the area, it was elliptically excised.  It was oriented with the pathologist, and the superior and inferior flaps were undermined widely, and it was closed in a multiple layer closure with a 3-0 Vicryl and a 4-0 nylon suture.  He tolerated the procedure very well.  I reviewed the pathology report with the pathologist while the surgery was on hold, and all the surgical margins were free.  He was transferred back to recovery room awake, alert, and responsive.        ______________________________  Joseph Bynum MD    [facility]/UP  DD:  09/14/2020  Time:  02:03PM  DT:  09/14/2020  Time:  10:07PM  Job #:  [id number]

## 2022-04-30 NOTE — PROGRESS NOTES
Patient:   Nick Son Jr            MRN: [id number]            FIN: [id number]               Age:   67 years     Sex:  Male     :  1950   Associated Diagnoses:   None   Author:   Lisa Chaparro MD        Referring Physician: Dr Paz  PCP:       Visit Information     Problem List:  1. DLBCL CD20+, BCL6+ and c-Myc+ c/w Double hit   3.  Left renal mass concerning for primary renal cell carcinoma  4.  Chemotherapy-induced pancytopenia    Current Treatment:   R-EPOCH at [facility]-- cycle 1 given 17-17 with Neulasta support.  IT MTX held with C1  -Completed cycle 5 last Wednesday, 2017 and received Neulasta the following day on Thursday.     Treatment History:  N/A    HPI/Clinical History:  67 yo wm patient of Dr. Apolinar Ocasio who was c/o some dyspepsia and bloating for several months, underwent an EGD 17 with finding of liquiefied food material in the fundus suggestive of delayed gastric emptying.  He was found to have an ulcerated circumferential mass of malignant appearance in the antrum, mass caused a partial obstruction, but the scope did traverse the lesion.  Duodenum and esophagus were normal. He started feeling bad the next day after the scope having N/V and diarrhea with dark stools so he was instructed to report to ED. He was admitted to hospital and underwent CT A/P which showed mucosal thickening in stomach, left renal mass and abdominal adenopathy. He was scheduled to have a diagnostic laparoscopy today with plans for J-tube placement however this was placed on hold. Biopsy results from the stomach mass returned NHL, B-cell type with high grade features. Further testing including consultation with a hematopathologist is underway to further determine what type of high grade lymphoma. I have been consulted for oncology opinion.  Patient is actually feeling much better, tolerating liquid diet with no further N/V. Still has some pain and bloating at times but this is  improved. Denies any night sweats or fever/chills but does report an unintentional weight loss of 20lbs in the last few months.  EGD biopsy results from May 30, 2017 showed non-Hodgkin's lymphoma B-cell type with high-grade features tumor cells positive for CD20, CD10, and BCL 6 but negative for BCL-2, and CD5.  The patient then underwent CT scan of the chest abdomen pelvis on May 31, 2017 which showed left renal mass extending into the left renal vein, the renal vein component measures 2.8 x1.4x2.0 cm. The left upper pole mass measures approximately 3.5 x 4.7 x 3.6 cm.Significant concentric mucosal thickening which is irregular along the distal gastric body and gastric antrum, which may relate to gastritis however is concerning for malignancy. Correlation with recent endoscopy is suggested, multiple enlarged mesenteric, gastrohepatic and retroperitoneal lymph nodes, highly concerning for metastasis.  On Nusrat 3, 2017 he underwent diagnostic laparoscopy by Dr. Paz with perigastric lymph node biopsy along with bone marrow biopsy and port placement.  Pathology from the perigastric lymph node biopsy showed 2 discrete perigastric lymph nodes both positive for diffuse large B-cell lymphoma, CD20 positive.  Bone marrow biopsy was negative for any evidence of lymphoma involvement.  The patient went for second opinion at [facility] and was seen by Dr. Brown, who initially recommended R CHOP before additional testing from the gastric biopsy came back showing both BCL 6 and c-Myc positivity.  PET/CT done June 6, 2017 showed multiple hypermetabolic peripancreatic lymph nodes along with hypermetabolic gastric mass with SUV greater than 20s and most of these regions.  These refer to the report for more details.  Renal mass did not light up in the left upper pole.  The patient then came back to [place] to get established with me to discuss treatment options.  We got him into [facility] for initiation of cycle 1 of treatment  started June 18, 2017 after he was admitted.    He did very well with treatment with significant improvement in his symptoms specifically his GI issues.  He received Neulasta support.  He also received packed red blood cell transfusion support during his admission.  Patient went back for cycle 2 July 7 - July 12, 2017 which he tolerated very well.  He underwent lumbar puncture and intrathecal methotrexate administration as well.  He then went back for cycle #3 end of July 2017 which he also did very well with.  Repeat PET/CT done prior to cycle #4 on August 17, 2017 essentially showed complete response to therapy with some residual areas of soft tissue thickening in the periportal region with the ball score of 1 likely representing treated disease.  Mass in the left kidney with extension of the left renal vein that remains non-FDG avid on PET likely representing primary renal cell carcinoma shows overall stability-->   He completed cycle 5 of treatment at [facility] on Wednesday, September 20, 2017     PMHx: Hypertension  PSHx: Appendectomy, arm surgery, several back surgeries for ruptured and bulging disks, surgery for squamous cell carcinoma on the nose in 2013, vascular bypass in the leg in 2015, disc compression in 2016, basal cell carcinoma surgery in 2016.  Social Hx: Patient is  and self-employed.  He does not smoke cigarettes but has smoked cigars in the past.  Social drinker.  Family Hx:  Father with esophageal cancer.       Chief Complaint       B-cell lymphoma      Interval History   Patient here for follow-up visit.  He completed cycle 5 of treatment at [facility] on Wednesday, September 20, 2017.    No fevers or chills since that time.  However he has developed increasing weakness and some bone pain in his right thigh.  This began over the weekend couple days after he came back from [place].  Labs today show significant thrombocytopenia and neutropenia as noted below.  Again no febrile  episodes.  No active nausea vomiting or diarrhea.  No new neurological complaints.        Review of Systems   12 point review of systems done in full with pertinent positives as noted above in interval history.      Health Status   Allergies:    Allergic Reactions (Selected)  Severity Not Documented  Darvocet-N 100- Itching, hives.  Demerol- Itching.  Morphine- Itching.,    Allergies (3) Active Reaction  Darvocet-N 100 itching, hives  Demerol ITCHING  morphine ITCHING     Current medications:  (Selected)   Outpatient Medications  Future  Benadryl (for IVPB): 12.5 mg, form: Infusion, IV Piggyback, Once-chemo, Infuse over: 20 minute(s), *Est. first dose 09/25/17 14:00:00 CDT, *Est. stop date 09/25/17 14:00:00 CDT, Future Order, Pre-Med for Transfusion, Days 1  Tylenol: 650 mg, form: Tab, Oral, Once-chemo, *Est. first dose 09/25/17 14:00:00 CDT, *Est. stop date 09/25/17 14:00:00 CDT, Pre-Med for Transfusion, Days 1, Future Order, [phone]  Prescriptions  Prescribed  Levaquin 500 mg oral tablet: 500 mg = 1 tab(s), Oral, q24hr, X 7 day(s), # 7 tab(s), 0 Refill(s), Pharmacy: [facility], Patient Education Provided, Patient Verbalizes Understanding  Documented Medications  Documented  Allergy (Loratadine) 10 mg oral tablet: 10 mg = 1 tab(s), Oral, Daily, # 30 tab(s), 0 Refill(s)  Bactrim  mg-160 mg oral tablet: on po qd monday wednesday friday, 0 Refill(s)  Pravastatin 40 mg Oral Tab: 40 mg = 1 tab(s), Oral, At Bedtime, # 30 tab(s), 0 Refill(s)  Reglan 5 mg oral tablet: 10 mg = 2 tab(s), Oral, q6hr, 0 Refill(s)  Senokot: prn, 0 Refill(s)  Zofran 8 mg oral tablet: 8 mg = 1 tab(s), Oral, q8hr, PRN PRN as needed for nausea/vomiting, 0 Refill(s)  alPRAzolam 0.5 mg oral tablet: 0.5 mg = 1 tab(s), Oral, q6hr, PRN PRN anxiety, # 30 tab(s), 0 Refill(s)  entecavir 0.5 mg oral tablet: 0.5 mg = 1 tab(s), Oral, Daily, 0 Refill(s)  esomeprazole 40 mg oral delayed release capsule ([facility] Substitution): 40 mg, Oral,  Daily, # 30, 0 Refill(s)  valacyclovir 500 mg oral tablet: 500 mg = 1 tab(s), Oral, Daily, 0 Refill(s)      Physical Examination   Vital Signs   9/25/2017 12:46 CDT      Temperature Oral          37.2 DegC                             Peripheral Pulse Rate     117 bpm  HI                             Systolic Blood Pressure   98 mmHg                             Diastolic Blood Pressure  74 mmHg        Vital Signs (last 24 hrs)_____  Last Charted___________  Temp Oral     37.2 DegC  (SEP 25 12:46)  Heart Rate Peripheral   H 117bpm  (SEP 25 12:46)  SBP      98 mmHg  (SEP 25 12:46)  DBP      74 mmHg  (SEP 25 12:46)  Weight      79.3 kg  (SEP 25 12:46)  Height      172.7 cm  (SEP 25 12:46)  BMI      26.59  (SEP 25 12:46)     General:  Alert and oriented, No acute distress.    Eye:  Extraocular movements are intact, Normal conjunctiva.    HENT:  Normocephalic, Oral mucosa is moist.    Neck:  Supple, No lymphadenopathy.    Respiratory:  Lungs are clear to auscultation, Respirations are non-labored, Breath sounds are equal.    Cardiovascular:  Normal rate, Regular rhythm, No edema.    Gastrointestinal:  Soft, Non-tender, Non-distended, Normal bowel sounds.    Integumentary:  Warm, Dry, Intact.    Neurologic:  Alert, Oriented, Normal sensory, No focal deficits.    Psychiatric:  Cooperative, Appropriate mood & affect.    Lymphatics:  No lymphadenopathy neck, axilla, groin.    Cognition and Speech:  Oriented, Speech clear and coherent.    ECOG Performance Scale: 1- Strenuous physical activity restricted; fully ambulatory and able to carry out light work.      Review / Management   Results review:  Lab results   9/25/2017 12:24 CDT      POC Hb                    10.2 mg/dL  LOW                             POC Hct                   30.0 %  LOW                             POC Sodium                134 mmol/L  LOW                             POC Potassium             4.1 mmol/L                             POC Chloride               96 mmol/L  LOW                             POC Ion Calcium           1.17 mmol/L                             POC Glucose               175 mg/dL  HI                             POC BUN                   12.0 mg/dL                             POC Creatinine            0.9 mg/dL                             POC AGAP                  16.0  NA    9/25/2017 12:19 CDT      WBC                       0.6 x10(3)/mcL  CRIT                             RBC                       3.28 x10(6)/mcL  LOW                             Hgb                       10.3 gm/dL  LOW                             Hct                       31.5 %  LOW                             Platelet                  5 x10(3)/mcL  CRIT                             MCV                       96.0 fL  HI                             MCH                       31.4 pg  HI                             MCHC                      32.7 gm/dL  LOW                             RDW                       14.7 %                             MPV                       ---- fL                             Abs Neut                  0.08 x10(3)/mcL  LOW                             NEUT%                     14.6 %  NA                             NEUT#                     0.1 x10(3)/mcL  LOW                             LYMPH%                    63.6 %  NA                             LYMPH#                    0.4 x10(3)/mcL  LOW                             MONO%                     9.1 %  NA                             MONO#                     0.0 x10(3)/mcL  LOW                             EOS%                      10.9 %  NA                             EOS#                      0.1 x10(3)/mcL                             BASO%                     1.8 %  NA                             BASO#                     0.0 x10(3)/mcL  .       Impression and Plan   Diagnoses:  1. Stage IV DLBCL CD20+, BCL6+ and c-Myc+ c/w Double hit --> noted from initial pathology.  2.  Chemotherapy-induced  pancytopenia.  3.  Left renal mass concerning for primary renal cell carcinoma      Plan:  Transfuse 1 unit single donor platelets today with premedications.  Orders written for.  We will also add prophylactic Levaquin 500 mg p.o. daily ×7 days in addition to the Bactrim that he is taking, in light of his profound neutropenia.  He has been given instructions to contact us should he develop any febrile episodes.  Instructions also given regarding starting probiotics.  Repeat CBC and CMP this coming Thursday, September 28, 2017 and again in 1 week with repeat clinic visit prior to his sixth and final cycle at [facility].  All questions answered at this time      MARIA DE JESUS Chaparro MD

## 2022-05-12 ENCOUNTER — TELEPHONE (OUTPATIENT)
Dept: INTERNAL MEDICINE | Facility: CLINIC | Age: 72
End: 2022-05-12
Payer: COMMERCIAL

## 2022-05-12 DIAGNOSIS — E11.9 TYPE 2 DIABETES MELLITUS WITHOUT COMPLICATION, UNSPECIFIED WHETHER LONG TERM INSULIN USE: Primary | ICD-10-CM

## 2022-05-12 DIAGNOSIS — R53.83 FATIGUE, UNSPECIFIED TYPE: ICD-10-CM

## 2022-05-12 NOTE — TELEPHONE ENCOUNTER
DM Labs placed.   Pt has appointment on 5/23/22 He would like to know if he could have testosterone checked due to chronic fatigue

## 2022-05-12 NOTE — TELEPHONE ENCOUNTER
----- Message from Ag Virgen sent at 5/11/2022  3:37 PM CDT -----  .Type:  Needs Medical Advice    Who Called: Nick  Symptoms (please be specific):    How long has patient had these symptoms:    Pharmacy name and phone #:    Would the patient rather a call back or a response via [facility]?   Best Call Back Number: [phone]  Additional Information: He needs to know if he needs any blood work done before his apt

## 2022-05-16 DIAGNOSIS — E11.9 TYPE 2 DIABETES MELLITUS WITHOUT COMPLICATION, UNSPECIFIED WHETHER LONG TERM INSULIN USE: Primary | ICD-10-CM

## 2022-05-17 ENCOUNTER — LAB VISIT (OUTPATIENT)
Dept: LAB | Facility: HOSPITAL | Age: 72
End: 2022-05-17
Attending: INTERNAL MEDICINE
Payer: COMMERCIAL

## 2022-05-17 ENCOUNTER — TELEPHONE (OUTPATIENT)
Dept: INTERNAL MEDICINE | Facility: CLINIC | Age: 72
End: 2022-05-17
Payer: COMMERCIAL

## 2022-05-17 DIAGNOSIS — E11.9 TYPE 2 DIABETES MELLITUS WITHOUT COMPLICATION, UNSPECIFIED WHETHER LONG TERM INSULIN USE: ICD-10-CM

## 2022-05-17 DIAGNOSIS — R53.83 FATIGUE, UNSPECIFIED TYPE: ICD-10-CM

## 2022-05-17 LAB
ANION GAP SERPL CALC-SCNC: 8 MEQ/L
BUN SERPL-MCNC: 17.6 MG/DL (ref 8.4–25.7)
CALCIUM SERPL-MCNC: 8.9 MG/DL (ref 8.8–10)
CHLORIDE SERPL-SCNC: 106 MMOL/L (ref 98–107)
CHOLEST SERPL-MCNC: 184 MG/DL
CHOLEST/HDLC SERPL: 5 {RATIO} (ref 0–5)
CO2 SERPL-SCNC: 28 MMOL/L (ref 23–31)
CREAT SERPL-MCNC: 1.15 MG/DL (ref 0.73–1.18)
CREAT/UREA NIT SERPL: 15
EST. AVERAGE GLUCOSE BLD GHB EST-MCNC: 134.1 MG/DL
GLUCOSE SERPL-MCNC: 147 MG/DL (ref 82–115)
HBA1C MFR BLD: 6.3 %
HDLC SERPL-MCNC: 36 MG/DL (ref 35–60)
LDLC SERPL CALC-MCNC: 60 MG/DL (ref 50–140)
POTASSIUM SERPL-SCNC: 4.3 MMOL/L (ref 3.5–5.1)
SODIUM SERPL-SCNC: 142 MMOL/L (ref 136–145)
TRIGL SERPL-MCNC: 442 MG/DL (ref 34–140)
VLDLC SERPL CALC-MCNC: 88 MG/DL

## 2022-05-17 PROCEDURE — 80061 LIPID PANEL: CPT

## 2022-05-17 PROCEDURE — 36415 COLL VENOUS BLD VENIPUNCTURE: CPT

## 2022-05-17 PROCEDURE — 83036 HEMOGLOBIN GLYCOSYLATED A1C: CPT

## 2022-05-17 PROCEDURE — 80048 BASIC METABOLIC PNL TOTAL CA: CPT

## 2022-05-17 PROCEDURE — 84402 ASSAY OF FREE TESTOSTERONE: CPT

## 2022-05-17 NOTE — TELEPHONE ENCOUNTER
----- Message from Russell Stodadrd MA sent at 5/16/2022  8:03 AM CDT -----  Regarding: PV 5/23/22 @ 2:20 Dr. Moon  1. Are there any outstanding tasks in the patient's chart? Yes, fasting labs     2. Is there any documentation in the chart? No    3.Has patient been seen in an ER, Urgent care clinic, or been admitted since last visit?  If yes, When, where, and why    4. Has patient seen any other healthcare providers since last visit?  If yes, when, where, and why    5. Has patient had any bloodwork or XR done since last visit?    6. Is patient signed up for patient portal?

## 2022-05-19 LAB — TESTOST FREE SERPL-MCNC: 59 PG/ML

## 2022-05-23 ENCOUNTER — OFFICE VISIT (OUTPATIENT)
Dept: INTERNAL MEDICINE | Facility: CLINIC | Age: 72
End: 2022-05-23
Payer: COMMERCIAL

## 2022-05-23 VITALS
SYSTOLIC BLOOD PRESSURE: 130 MMHG | HEART RATE: 76 BPM | BODY MASS INDEX: 30.11 KG/M2 | WEIGHT: 176.38 LBS | RESPIRATION RATE: 16 BRPM | DIASTOLIC BLOOD PRESSURE: 84 MMHG | HEIGHT: 64 IN | OXYGEN SATURATION: 99 % | TEMPERATURE: 98 F

## 2022-05-23 DIAGNOSIS — G47.33 OSA (OBSTRUCTIVE SLEEP APNEA): ICD-10-CM

## 2022-05-23 DIAGNOSIS — E11.9 TYPE 2 DIABETES MELLITUS WITHOUT COMPLICATION, WITHOUT LONG-TERM CURRENT USE OF INSULIN: Primary | ICD-10-CM

## 2022-05-23 DIAGNOSIS — R53.83 FATIGUE, UNSPECIFIED TYPE: ICD-10-CM

## 2022-05-23 DIAGNOSIS — E29.1 HYPOGONADISM IN MALE: ICD-10-CM

## 2022-05-23 PROCEDURE — 1160F PR REVIEW ALL MEDS BY PRESCRIBER/CLIN PHARMACIST DOCUMENTED: ICD-10-PCS | Mod: CPTII,,, | Performed by: INTERNAL MEDICINE

## 2022-05-23 PROCEDURE — 3008F BODY MASS INDEX DOCD: CPT | Mod: CPTII,,, | Performed by: INTERNAL MEDICINE

## 2022-05-23 PROCEDURE — 3288F FALL RISK ASSESSMENT DOCD: CPT | Mod: CPTII,,, | Performed by: INTERNAL MEDICINE

## 2022-05-23 PROCEDURE — 1160F RVW MEDS BY RX/DR IN RCRD: CPT | Mod: CPTII,,, | Performed by: INTERNAL MEDICINE

## 2022-05-23 PROCEDURE — 4010F PR ACE/ARB THEARPY RXD/TAKEN: ICD-10-PCS | Mod: CPTII,,, | Performed by: INTERNAL MEDICINE

## 2022-05-23 PROCEDURE — 1159F PR MEDICATION LIST DOCUMENTED IN MEDICAL RECORD: ICD-10-PCS | Mod: CPTII,,, | Performed by: INTERNAL MEDICINE

## 2022-05-23 PROCEDURE — 1101F PT FALLS ASSESS-DOCD LE1/YR: CPT | Mod: CPTII,,, | Performed by: INTERNAL MEDICINE

## 2022-05-23 PROCEDURE — 3079F PR MOST RECENT DIASTOLIC BLOOD PRESSURE 80-89 MM HG: ICD-10-PCS | Mod: CPTII,,, | Performed by: INTERNAL MEDICINE

## 2022-05-23 PROCEDURE — 1101F PR PT FALLS ASSESS DOC 0-1 FALLS W/OUT INJ PAST YR: ICD-10-PCS | Mod: CPTII,,, | Performed by: INTERNAL MEDICINE

## 2022-05-23 PROCEDURE — 1159F MED LIST DOCD IN RCRD: CPT | Mod: CPTII,,, | Performed by: INTERNAL MEDICINE

## 2022-05-23 PROCEDURE — 3079F DIAST BP 80-89 MM HG: CPT | Mod: CPTII,,, | Performed by: INTERNAL MEDICINE

## 2022-05-23 PROCEDURE — 99214 PR OFFICE/OUTPT VISIT, EST, LEVL IV, 30-39 MIN: ICD-10-PCS | Mod: ,,, | Performed by: INTERNAL MEDICINE

## 2022-05-23 PROCEDURE — 3008F PR BODY MASS INDEX (BMI) DOCUMENTED: ICD-10-PCS | Mod: CPTII,,, | Performed by: INTERNAL MEDICINE

## 2022-05-23 PROCEDURE — 99214 OFFICE O/P EST MOD 30 MIN: CPT | Mod: ,,, | Performed by: INTERNAL MEDICINE

## 2022-05-23 PROCEDURE — 3075F SYST BP GE 130 - 139MM HG: CPT | Mod: CPTII,,, | Performed by: INTERNAL MEDICINE

## 2022-05-23 PROCEDURE — 4010F ACE/ARB THERAPY RXD/TAKEN: CPT | Mod: CPTII,,, | Performed by: INTERNAL MEDICINE

## 2022-05-23 PROCEDURE — 3075F PR MOST RECENT SYSTOLIC BLOOD PRESS GE 130-139MM HG: ICD-10-PCS | Mod: CPTII,,, | Performed by: INTERNAL MEDICINE

## 2022-05-23 PROCEDURE — 3288F PR FALLS RISK ASSESSMENT DOCUMENTED: ICD-10-PCS | Mod: CPTII,,, | Performed by: INTERNAL MEDICINE

## 2022-05-23 RX ORDER — DIGOXIN 125 MCG
125 TABLET ORAL
COMMUNITY

## 2022-05-23 RX ORDER — LOSARTAN POTASSIUM 25 MG/1
25 TABLET ORAL DAILY
COMMUNITY

## 2022-05-23 RX ORDER — PRAVASTATIN SODIUM 80 MG/1
40 TABLET ORAL DAILY
COMMUNITY
Start: 2022-05-06

## 2022-05-23 RX ORDER — ORAL SEMAGLUTIDE 3 MG/1
3 TABLET ORAL
COMMUNITY
End: 2022-08-18 | Stop reason: CLARIF

## 2022-05-23 RX ORDER — ONDANSETRON 8 MG/1
TABLET, ORALLY DISINTEGRATING ORAL
COMMUNITY
Start: 2021-11-29 | End: 2023-08-01

## 2022-05-23 RX ORDER — TAMSULOSIN HYDROCHLORIDE 0.4 MG/1
0.4 CAPSULE ORAL
COMMUNITY
Start: 2021-06-23 | End: 2022-05-23

## 2022-05-23 NOTE — PROGRESS NOTES
Subjective:      Patient ID: Nick Son Jr is a 71 y.o. male.    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 [facility] and Dr Chaparro. Still goes once a year. Goes back June 23rd.  Renal cell cancer- left nephrectomy. 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 Bynum   Blood sugar/ librae log reviewed. Fastings are consistently high and postprandials are over 200  At the time of his last visit 3 months ago we Increased Rybelsus to 7 mg; A1c improved from 6.7 to 6.3, fasting sugar at 147 a.m.  Testosterone level normal at 57  17.6 and 1.15 for BUN and creatinine    Genoveva Snow MD is his oncologist at [facility] overall he is doing well he just has some fatigue but he is also having some frequent nighttime awakenings and not sure why he is waking up he states if he takes a Tylenol p.m. he does not wake up.  He does have known sleep apnea he thinks that his settings were changed about 6 months ago so we need to get in touch with his sleep company and have them read his SD card.        Problem List Items Addressed This Visit        Endocrine    Type 2 diabetes mellitus without complication, without long-term current use of insulin - Primary    Relevant Medications    semaglutide (RYBELSUS) 3 mg tablet    Other Relevant Orders    CBC Auto Differential    Comprehensive Metabolic Panel    Hemoglobin A1C    Hypogonadism in male       Other    Fatigue    ANTWAN (obstructive sleep apnea)              Past Medical History:  Past Medical History:   Diagnosis Date    Diabetes mellitus, type 2      History reviewed. No pertinent surgical history.  Review of patient's allergies indicates:   Allergen Reactions    Meperidine Hives and Itching     Propoxyphene n-acetaminophen Hives and Itching    Morphine Hives and Itching     Current Outpatient Medications on File Prior to Visit   Medication Sig Dispense Refill    digoxin (LANOXIN) 125 mcg tablet Take 125 mcg by mouth.      flash glucose sensor (FREESTYLE DERICK 14 DAY SENSOR [facility])   freestyle derick sensor, See Instructions, change sensor out every 14 days, sub q, rotating sites, # 6 EA, 3 Refill(s), Pharmacy: [facility]/pharmacy #[id number], 166, cm, Height/Length Dosing, 06/07/21 15:37:00 CDT, 81.9, kg, Weight Dosing, 06/07/21 15:37:00 CDT      losartan (COZAAR) 25 MG tablet Take 25 mg by mouth.      ondansetron (ZOFRAN-ODT) 8 MG TbDL       pravastatin (PRAVACHOL) 80 MG tablet Take 80 mg by mouth once daily.      semaglutide (RYBELSUS) 3 mg tablet Take 3 mg by mouth.      [DISCONTINUED] tamsulosin (FLOMAX) 0.4 mg Cap Take 0.4 mg by mouth.       No current facility-administered medications on file prior to visit.     Social History     Socioeconomic History    Marital status:    Tobacco Use    Smoking status: Never Smoker    Smokeless tobacco: Never Used   Substance and Sexual Activity    Drug use: Never    Sexual activity: Not Currently     History reviewed. No pertinent family history.        Review of Systems  Constitutional: No fever,  no fatigue, no chills, + night sweats, no weight gain, no weight loss, no changes in appetite.   Eye: No redness, no acute vision loss, no blurred vision, no double vision, no eye pain  ENMT: No sore throat, no nasal drainage, no nose bleeds,  no headache, no ear pain, no ear drainage, no acute hearing loss  Respiratory: No cough, no sputum production, no shortness of breath, no hemoptysis, no wheezing.  Cardiovascular: No chest pain, no chest tightness, no BRINK, no PND, no orthopnea, no swelling, no palpitations.  Gastrointestinal: No abdominal pain, no nausea, no vomiting, no diarrhea, no constipation, no difficulty swallowing, no change in bowel habits, no rectal  "bleeding  Genitourinary: no urgency, no frequency, no burning or pain when urinating, no blood in urine, no incontinence  Heme/Lymph: No easy bruising and/or bleeding, no swollen or painful glands.  Endocrine: No polyuria, no polydipsia, no polyphagia, no heat or cold intolerance.  Musculoskeletal: No muscle pain, no muscle weakness, no joint pain, no red or swollen joints.  Integumentary: No rash, no pruritis, no hair or nail changes.  Neurologic: No dizziness, no fainting, no tremors, no tingling and/ or numbness.  Psychiatric: No anxiety, no depression, no memory loss  All Other ROS: Negative with exception of what is documented in the history of present illness     Objective:   /84 (BP Location: Left arm, Patient Position: Sitting, BP Method: Medium (Manual))   Pulse 76   Temp 98.2 °F (36.8 °C) (Temporal)   Resp 16   Ht 5' 4" (1.626 m)   Wt 80 kg (176 lb 5.9 oz)   SpO2 99%   BMI 30.27 kg/m²     Physical Exam  General : Alert and oriented, No acute distress, well, developed, well nourished, afebrile   Eye : PERRLA. EOMI. Normal conjunctiva without injection. Sclerae are nonicteric. No pallor.  HEENT : Normocephalic. Neck supple. Normal hearing. Oral mucosa is moist.  Respiratory : Lungs are clear to auscultation bilaterally, non-labored. Symmetrical chest wall expansion. No crackles, wheeze, or rhonci.  Cardiovascular : Normal rate, Regular rhythm. No murmurs, rubs, or gallops. Pulses 2+ in all extremities. No Edema.  Gastrointestinal : Soft, nontender, non-distended, bowel sounds normal, no organomegaly, no guarding, no rebound.  Musculoskeletal : Normal ROM.  No muscle tenderness.  Integumentary : Warm to touch. Intact. No rash.    Neurologic : Alert and oriented. No focal deficits  Psychiatric : Cooperative, Appropriate mood & affect. Normal judgment.          Assessment:     1. Type 2 diabetes mellitus without complication, without long-term current use of insulin    2. Hypogonadism in male    3. " Fatigue, unspecified type    4. ANTWAN (obstructive sleep apnea)                  Plan:       I am having Nick Son Jr maintain his flash glucose sensor (FREESTYLE DREICK 14 DAY SENSOR [facility]), digoxin, losartan, ondansetron, pravastatin, and RYBELSUS.  Continue current medication management will contact [facility] and see if his SD card can be interrogated to see if any adjustments need to be made.  He is also okay to take Tylenol p.m. if needed at nighttime for sleep however encouraged use of melatonin 5 mg.  He follows up with his oncologist next month for up-to-date scans--especially in lieu of his night sweats but all of his labs look good.  I will go ahead and send her a progress note.    Problem List Items Addressed This Visit        Endocrine    Type 2 diabetes mellitus without complication, without long-term current use of insulin - Primary    Relevant Medications    semaglutide (RYBELSUS) 3 mg tablet    Other Relevant Orders    CBC Auto Differential    Comprehensive Metabolic Panel    Hemoglobin A1C    Hypogonadism in male       Other    Fatigue    ANTWAN (obstructive sleep apnea)            Nick was seen today for diabetes.    Diagnoses and all orders for this visit:    Type 2 diabetes mellitus without complication, without long-term current use of insulin  -     CBC Auto Differential; Future  -     Comprehensive Metabolic Panel; Future  -     Hemoglobin A1C; Future    Hypogonadism in male    Fatigue, unspecified type    ANTWAN (obstructive sleep apnea)               [unfilled]  Orders Placed This Encounter   Procedures    CBC Auto Differential     Standing Status:   Future     Standing Expiration Date:   8/23/2022    Comprehensive Metabolic Panel     Standing Status:   Future     Standing Expiration Date:   9/23/2022    Hemoglobin A1C     Standing Status:   Future     Standing Expiration Date:   9/23/2022                   Follow up in about 3 months (around 8/23/2022) for with labs prior to visit.

## 2022-05-24 ENCOUNTER — TELEPHONE (OUTPATIENT)
Dept: INTERNAL MEDICINE | Facility: CLINIC | Age: 72
End: 2022-05-24
Payer: COMMERCIAL

## 2022-05-24 NOTE — TELEPHONE ENCOUNTER
Called [facility] and LVM asking for pt's SD card last report.   I gave fax number and phone number in message.

## 2022-05-24 NOTE — TELEPHONE ENCOUNTER
----- Message from Rosalie Everett sent at 5/24/2022 11:11 AM CDT -----  Regarding: FW: Sleep apnea    ----- Message -----  From: Ever Quezada MD  Sent: 5/23/2022   4:39 PM CDT  To: , #  Subject: Sleep apnea                                      Patient is reporting frequent nighttime awakenings and insomnia.  He believes that he gets his sleep equipments from [facility].  Can we give them a call and see if they can read his SD card and see if any adjustments need to be made to his machine.

## 2022-05-25 ENCOUNTER — TELEPHONE (OUTPATIENT)
Dept: INTERNAL MEDICINE | Facility: CLINIC | Age: 72
End: 2022-05-25
Payer: COMMERCIAL

## 2022-05-25 NOTE — TELEPHONE ENCOUNTER
Spoke to [facility] for [facility] and they have no record of pt having machine with them.  She transferred me to Monet from [facility] and they also have no record of pt having machine.   I spoke to pt and he stated that it was a place on [facility], and he was going to call back with the information.   [facility] called back stating that pt had machine from 2007. I asked her if she knew of another placeon [facility] and she gave me the name [facility].   I called them and spoke to Adeline and she stated that they do have pt's new machine and they are going to send last report via fax.   I called pt and informed him that I got in touch with [facility] who has his new machine.

## 2022-05-25 NOTE — TELEPHONE ENCOUNTER
Compliance report from [facility] reviewed and patient appears to be compliant with the equipment for most days out of the month  is there anyway to make some adjustments to see if it would be beneficial for the patient? Can we have them reach out to the patient? Clearly I'm not a sleep physician? Or can Dr Cordon recommend some changes?

## 2022-05-26 NOTE — TELEPHONE ENCOUNTER
Spoke to Hermila from [facility] she stated that they are going to get in touch with the pt and Dr. Christie, his sleep physician, to see what needs to be adjusted.   She also stated there might need to be another sleep study done.   I called pt and informed him that the ball is rolling and he will be getting phone calls to schedule things.

## 2022-05-31 RX ORDER — ORAL SEMAGLUTIDE 7 MG/1
7 TABLET ORAL DAILY
Qty: 30 TABLET | Refills: 11 | Status: SHIPPED | OUTPATIENT
Start: 2022-05-31 | End: 2022-08-25 | Stop reason: SDUPTHER

## 2022-05-31 NOTE — PROGRESS NOTES
rx sent for 7mg rybelsus, looks like he is due for revisit per dr hedrick last note? Is there one scheduled?

## 2022-06-20 ENCOUNTER — PATIENT MESSAGE (OUTPATIENT)
Dept: INTERNAL MEDICINE | Facility: CLINIC | Age: 72
End: 2022-06-20
Payer: COMMERCIAL

## 2022-06-21 NOTE — TELEPHONE ENCOUNTER
Called patient to get more information to request. Patient advised me to reach out to his wife Viviane. Spoke with patient's wife who states she has copies of the information and will attempt to email to myself or drop off to the office later this afternoon.

## 2022-06-22 ENCOUNTER — PATIENT MESSAGE (OUTPATIENT)
Dept: INTERNAL MEDICINE | Facility: CLINIC | Age: 72
End: 2022-06-22
Payer: COMMERCIAL

## 2022-06-22 DIAGNOSIS — D64.9 ANEMIA, UNSPECIFIED TYPE: Primary | ICD-10-CM

## 2022-08-16 ENCOUNTER — LAB VISIT (OUTPATIENT)
Dept: LAB | Facility: HOSPITAL | Age: 72
End: 2022-08-16
Attending: INTERNAL MEDICINE
Payer: COMMERCIAL

## 2022-08-16 DIAGNOSIS — E11.9 TYPE 2 DIABETES MELLITUS WITHOUT COMPLICATION, WITHOUT LONG-TERM CURRENT USE OF INSULIN: ICD-10-CM

## 2022-08-16 DIAGNOSIS — D64.9 ANEMIA, UNSPECIFIED TYPE: ICD-10-CM

## 2022-08-16 LAB
ALBUMIN SERPL-MCNC: 3.7 GM/DL (ref 3.4–4.8)
ALBUMIN/GLOB SERPL: 1.2 RATIO (ref 1.1–2)
ALP SERPL-CCNC: 48 UNIT/L (ref 40–150)
ALT SERPL-CCNC: 26 UNIT/L (ref 0–55)
AST SERPL-CCNC: 18 UNIT/L (ref 5–34)
BASOPHILS # BLD AUTO: 0.03 X10(3)/MCL (ref 0–0.2)
BASOPHILS NFR BLD AUTO: 0.6 %
BILIRUBIN DIRECT+TOT PNL SERPL-MCNC: 0.5 MG/DL
BUN SERPL-MCNC: 17.6 MG/DL (ref 8.4–25.7)
CALCIUM SERPL-MCNC: 9.3 MG/DL (ref 8.8–10)
CHLORIDE SERPL-SCNC: 107 MMOL/L (ref 98–107)
CO2 SERPL-SCNC: 24 MMOL/L (ref 23–31)
CREAT SERPL-MCNC: 1.33 MG/DL (ref 0.73–1.18)
EOSINOPHIL # BLD AUTO: 0.18 X10(3)/MCL (ref 0–0.9)
EOSINOPHIL NFR BLD AUTO: 3.8 %
ERYTHROCYTE [DISTWIDTH] IN BLOOD BY AUTOMATED COUNT: 13 % (ref 11.5–17)
EST. AVERAGE GLUCOSE BLD GHB EST-MCNC: 142.7 MG/DL
FERRITIN SERPL-MCNC: 81.84 NG/ML (ref 21.81–274.66)
FOLATE SERPL-MCNC: 8.4 NG/ML (ref 7–31.4)
GFR SERPLBLD CREATININE-BSD FMLA CKD-EPI: 58 MLS/MIN/1.73/M2
GLOBULIN SER-MCNC: 3.1 GM/DL (ref 2.4–3.5)
GLUCOSE SERPL-MCNC: 165 MG/DL (ref 82–115)
HBA1C MFR BLD: 6.6 %
HCT VFR BLD AUTO: 43.7 % (ref 42–52)
HGB BLD-MCNC: 14.8 GM/DL (ref 14–18)
IMM GRANULOCYTES # BLD AUTO: 0.01 X10(3)/MCL (ref 0–0.04)
IMM GRANULOCYTES NFR BLD AUTO: 0.2 %
IRON SATN MFR SERPL: 30 % (ref 20–50)
IRON SERPL-MCNC: 84 UG/DL (ref 65–175)
LYMPHOCYTES # BLD AUTO: 1.78 X10(3)/MCL (ref 0.6–4.6)
LYMPHOCYTES NFR BLD AUTO: 37.6 %
MCH RBC QN AUTO: 31.4 PG (ref 27–31)
MCHC RBC AUTO-ENTMCNC: 33.9 MG/DL (ref 33–36)
MCV RBC AUTO: 92.6 FL (ref 80–94)
MONOCYTES # BLD AUTO: 0.47 X10(3)/MCL (ref 0.1–1.3)
MONOCYTES NFR BLD AUTO: 9.9 %
NEUTROPHILS # BLD AUTO: 2.3 X10(3)/MCL (ref 2.1–9.2)
NEUTROPHILS NFR BLD AUTO: 47.9 %
NRBC BLD AUTO-RTO: 0 %
PLATELET # BLD AUTO: 144 X10(3)/MCL (ref 130–400)
PMV BLD AUTO: 9.3 FL (ref 7.4–10.4)
POTASSIUM SERPL-SCNC: 4.7 MMOL/L (ref 3.5–5.1)
PROT SERPL-MCNC: 6.8 GM/DL (ref 5.8–7.6)
RBC # BLD AUTO: 4.72 X10(6)/MCL (ref 4.7–6.1)
RET# (OHS): 0.09 (ref 0.03–0.1)
RETICULOCYTE COUNT AUTOMATED (OLG): 1.85 % (ref 1.1–2.1)
SODIUM SERPL-SCNC: 140 MMOL/L (ref 136–145)
TIBC SERPL-MCNC: 199 UG/DL (ref 69–240)
TIBC SERPL-MCNC: 283 UG/DL (ref 250–450)
TRANSFERRIN SERPL-MCNC: 254 MG/DL (ref 163–344)
VIT B12 SERPL-MCNC: >2000 PG/ML (ref 213–816)
WBC # SPEC AUTO: 4.7 X10(3)/MCL (ref 4.5–11.5)

## 2022-08-16 PROCEDURE — 85045 AUTOMATED RETICULOCYTE COUNT: CPT

## 2022-08-16 PROCEDURE — 83036 HEMOGLOBIN GLYCOSYLATED A1C: CPT

## 2022-08-16 PROCEDURE — 82728 ASSAY OF FERRITIN: CPT

## 2022-08-16 PROCEDURE — 82607 VITAMIN B-12: CPT

## 2022-08-16 PROCEDURE — 80053 COMPREHEN METABOLIC PANEL: CPT

## 2022-08-16 PROCEDURE — 85025 COMPLETE CBC W/AUTO DIFF WBC: CPT

## 2022-08-16 PROCEDURE — 82746 ASSAY OF FOLIC ACID SERUM: CPT

## 2022-08-16 PROCEDURE — 36415 COLL VENOUS BLD VENIPUNCTURE: CPT

## 2022-08-16 PROCEDURE — 83540 ASSAY OF IRON: CPT

## 2022-08-24 LAB
LEFT EYE DM RETINOPATHY: NEGATIVE
RIGHT EYE DM RETINOPATHY: NEGATIVE

## 2022-08-25 ENCOUNTER — OFFICE VISIT (OUTPATIENT)
Dept: INTERNAL MEDICINE | Facility: CLINIC | Age: 72
End: 2022-08-25
Payer: COMMERCIAL

## 2022-08-25 VITALS
WEIGHT: 180.63 LBS | DIASTOLIC BLOOD PRESSURE: 80 MMHG | OXYGEN SATURATION: 97 % | HEIGHT: 65 IN | TEMPERATURE: 97 F | HEART RATE: 66 BPM | BODY MASS INDEX: 30.09 KG/M2 | SYSTOLIC BLOOD PRESSURE: 124 MMHG | RESPIRATION RATE: 16 BRPM

## 2022-08-25 DIAGNOSIS — R53.83 FATIGUE, UNSPECIFIED TYPE: Primary | ICD-10-CM

## 2022-08-25 DIAGNOSIS — E11.9 TYPE 2 DIABETES MELLITUS WITHOUT COMPLICATION, WITHOUT LONG-TERM CURRENT USE OF INSULIN: ICD-10-CM

## 2022-08-25 DIAGNOSIS — G47.33 OSA (OBSTRUCTIVE SLEEP APNEA): ICD-10-CM

## 2022-08-25 LAB — TSH SERPL-ACNC: 0.78 UIU/ML (ref 0.35–4.94)

## 2022-08-25 PROCEDURE — 84443 ASSAY THYROID STIM HORMONE: CPT | Performed by: INTERNAL MEDICINE

## 2022-08-25 PROCEDURE — 3066F NEPHROPATHY DOC TX: CPT | Mod: CPTII,,, | Performed by: INTERNAL MEDICINE

## 2022-08-25 PROCEDURE — 1159F MED LIST DOCD IN RCRD: CPT | Mod: CPTII,,, | Performed by: INTERNAL MEDICINE

## 2022-08-25 PROCEDURE — 3008F PR BODY MASS INDEX (BMI) DOCUMENTED: ICD-10-PCS | Mod: CPTII,,, | Performed by: INTERNAL MEDICINE

## 2022-08-25 PROCEDURE — 99214 OFFICE O/P EST MOD 30 MIN: CPT | Mod: ,,, | Performed by: INTERNAL MEDICINE

## 2022-08-25 PROCEDURE — 3066F PR DOCUMENTATION OF TREATMENT FOR NEPHROPATHY: ICD-10-PCS | Mod: CPTII,,, | Performed by: INTERNAL MEDICINE

## 2022-08-25 PROCEDURE — 3061F PR NEG MICROALBUMINURIA RESULT DOCUMENTED/REVIEW: ICD-10-PCS | Mod: CPTII,,, | Performed by: INTERNAL MEDICINE

## 2022-08-25 PROCEDURE — 3008F BODY MASS INDEX DOCD: CPT | Mod: CPTII,,, | Performed by: INTERNAL MEDICINE

## 2022-08-25 PROCEDURE — 3061F NEG MICROALBUMINURIA REV: CPT | Mod: CPTII,,, | Performed by: INTERNAL MEDICINE

## 2022-08-25 PROCEDURE — 1101F PR PT FALLS ASSESS DOC 0-1 FALLS W/OUT INJ PAST YR: ICD-10-PCS | Mod: CPTII,,, | Performed by: INTERNAL MEDICINE

## 2022-08-25 PROCEDURE — 36415 COLL VENOUS BLD VENIPUNCTURE: CPT | Performed by: INTERNAL MEDICINE

## 2022-08-25 PROCEDURE — 3079F PR MOST RECENT DIASTOLIC BLOOD PRESSURE 80-89 MM HG: ICD-10-PCS | Mod: CPTII,,, | Performed by: INTERNAL MEDICINE

## 2022-08-25 PROCEDURE — 1159F PR MEDICATION LIST DOCUMENTED IN MEDICAL RECORD: ICD-10-PCS | Mod: CPTII,,, | Performed by: INTERNAL MEDICINE

## 2022-08-25 PROCEDURE — 1101F PT FALLS ASSESS-DOCD LE1/YR: CPT | Mod: CPTII,,, | Performed by: INTERNAL MEDICINE

## 2022-08-25 PROCEDURE — 3074F SYST BP LT 130 MM HG: CPT | Mod: CPTII,,, | Performed by: INTERNAL MEDICINE

## 2022-08-25 PROCEDURE — 3079F DIAST BP 80-89 MM HG: CPT | Mod: CPTII,,, | Performed by: INTERNAL MEDICINE

## 2022-08-25 PROCEDURE — 3074F PR MOST RECENT SYSTOLIC BLOOD PRESSURE < 130 MM HG: ICD-10-PCS | Mod: CPTII,,, | Performed by: INTERNAL MEDICINE

## 2022-08-25 PROCEDURE — 4010F ACE/ARB THERAPY RXD/TAKEN: CPT | Mod: CPTII,,, | Performed by: INTERNAL MEDICINE

## 2022-08-25 PROCEDURE — 3288F FALL RISK ASSESSMENT DOCD: CPT | Mod: CPTII,,, | Performed by: INTERNAL MEDICINE

## 2022-08-25 PROCEDURE — 4010F PR ACE/ARB THEARPY RXD/TAKEN: ICD-10-PCS | Mod: CPTII,,, | Performed by: INTERNAL MEDICINE

## 2022-08-25 PROCEDURE — 99214 PR OFFICE/OUTPT VISIT, EST, LEVL IV, 30-39 MIN: ICD-10-PCS | Mod: ,,, | Performed by: INTERNAL MEDICINE

## 2022-08-25 PROCEDURE — 3288F PR FALLS RISK ASSESSMENT DOCUMENTED: ICD-10-PCS | Mod: CPTII,,, | Performed by: INTERNAL MEDICINE

## 2022-08-25 RX ORDER — ORAL SEMAGLUTIDE 7 MG/1
TABLET ORAL
Qty: 60 TABLET | Refills: 2 | Status: SHIPPED | OUTPATIENT
Start: 2022-08-25 | End: 2022-09-29 | Stop reason: SDUPTHER

## 2022-08-25 NOTE — PROGRESS NOTES
Subjective:      Patient ID: Nick Son Jr is a 71 y.o. male.    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 [facility] and Dr Chaparro. Still goes once a year. Goes back June 23rd.  Renal cell cancer- left nephrectomy. 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 Misa Snow MD is his oncologist at [facility]  Patient still with complaints of fatigue and large fluctuations in blood sugar, and a peanut butter sandwich for breakfast and subsequent post prandials at 200 her right over 200 and then after line she has some post prandials upwards of 250.  His A1c is little bit elevated this time at 6.6 fasting sugar at 164 and estimated average glucose at 142         Problem List Items Addressed This Visit        Endocrine    Type 2 diabetes mellitus without complication, without long-term current use of insulin    Current Assessment & Plan     Increase Rybelsus to 1.5 tablets daily he will send me an update in the next several weeks to see if we can get his fastings at 1:20 a.m. and is post prandials closer to around 180-100.  If this is not working number going to have to increase him to a full 14 mg.  We also did an MMSE today he scored a perfect 30/30; advised on some supplements of alpha lipoic acid, Ag pro, B complex and vitamin D.           Relevant Medications    semaglutide (RYBELSUS) 7 mg tablet       Other    Fatigue - Primary    Current Assessment & Plan     I think a lot of his fatigue is coming from large fluctuations in his sugar with sugar is high as around the times when he has noted that he is tired.  His sleep apnea machine has been adjusted and he is tolerating that at 14.  Also his labs  were reviewed and he is not anemic and I have given him reassurance on that today           Relevant Orders    TSH (Completed)    ANTWAN (obstructive sleep apnea)              Past Medical History:  Past Medical History:   Diagnosis Date    Diabetes mellitus, type 2      No past surgical history on file.  Review of patient's allergies indicates:   Allergen Reactions    Meperidine Hives and Itching    Morphine Hives and Itching    Propoxyphene n-acetaminophen Hives and Itching     Current Outpatient Medications on File Prior to Visit   Medication Sig Dispense Refill    cholecalciferol, vitamin D3, (VITAMIN D3) 10 mcg (400 unit) Tab Take 400 Units by mouth once daily.      cyanocobalamin (VITAMIN B-12) 100 MCG tablet Take 100 mcg by mouth once daily.      digoxin (LANOXIN) 125 mcg tablet Take 125 mcg by mouth.      fexofenadine (ALLEGRA) 60 MG tablet Take 60 mg by mouth once daily.      flash glucose sensor (FREESTYLE DERICK 14 DAY SENSOR [facility])   freestyle derick sensor, See Instructions, change sensor out every 14 days, sub q, rotating sites, # 6 EA, 3 Refill(s), Pharmacy: [facility]/pharmacy #[id number], 166, cm, Height/Length Dosing, 06/07/21 15:37:00 CDT, 81.9, kg, Weight Dosing, 06/07/21 15:37:00 CDT      FREESTYLE DERICK 14 DAY SENSOR Kit CHANGE SENSOR OUT EVERY 14 DAYS, SUB Q, ROTATING SITES 6 kit 3    ketoconazole (NIZORAL) 2 % cream 1 application 2 (two) times daily. Apply to affected area      losartan (COZAAR) 25 MG tablet Take 25 mg by mouth.      multivitamin with folic acid 400 mcg Tab Take 1 tablet by mouth once daily.      niacin 500 MG Tab Take 500 mg by mouth 2 (two) times daily with meals.      ondansetron (ZOFRAN-ODT) 8 MG TbDL       pravastatin (PRAVACHOL) 80 MG tablet Take 80 mg by mouth once daily.      vitamin E 100 UNIT capsule Take 100 Units by mouth once daily.      [DISCONTINUED] semaglutide (RYBELSUS) 7 mg tablet Take 1 tablet (7 mg total) by mouth once daily. 30 tablet 11     No current  facility-administered medications on file prior to visit.     Social History     Socioeconomic History    Marital status:    Tobacco Use    Smoking status: Never Smoker    Smokeless tobacco: Never Used   Substance and Sexual Activity    Alcohol use: Yes     Comment: Occassioanlly    Drug use: Never    Sexual activity: Not Currently     No family history on file.        Review of Systems  Constitutional: No fever,  no fatigue, no chills, no night sweats, no weight gain, no weight loss, no changes in appetite.   Eye: No redness, no acute vision loss, no blurred vision, no double vision, no eye pain  ENMT: No sore throat, no nasal drainage, no nose bleeds,  no headache, no ear pain, no ear drainage, no acute hearing loss  Respiratory: No cough, no sputum production, no shortness of breath, no hemoptysis, no wheezing.  Cardiovascular: No chest pain, no chest tightness, no BRINK, no PND, no orthopnea, no swelling, no palpitations.  Gastrointestinal: No abdominal pain, no nausea, no vomiting, no diarrhea, no constipation, no difficulty swallowing, no change in bowel habits, no rectal bleeding  Genitourinary: no urgency, no frequency, no burning or pain when urinating, no blood in urine, no incontinence  Heme/Lymph: No easy bruising and/or bleeding, no swollen or painful glands.  Endocrine: No polyuria, no polydipsia, no polyphagia, no heat or cold intolerance.  Musculoskeletal: No muscle pain, no muscle weakness, no joint pain, no red or swollen joints.  Integumentary: No rash, no pruritis, no hair or nail changes.  Neurologic: No dizziness, no fainting, no tremors, no tingling and/ or numbness.  Psychiatric: No anxiety, no depression, no memory loss  All Other ROS: Negative with exception of what is documented in the history of present illness     Objective:   /80 (BP Location: Right arm, Patient Position: Sitting, BP Method: Medium (Manual))   Pulse 66   Temp 97.3 °F (36.3 °C) (Temporal)   Resp 16    "Ht 5' 5" (1.651 m)   Wt 81.9 kg (180 lb 9.6 oz)   SpO2 97%   BMI 30.05 kg/m²     Physical Exam  General : Alert and oriented, No acute distress, well, developed, well nourished, afebrile   Eye : PERRLA. EOMI. Normal conjunctiva without injection. Sclerae are nonicteric. No pallor.  HEENT : Normocephalic. Neck supple.   Psychiatric : Cooperative, Appropriate mood & affect. Normal judgment.          Assessment:     1. Fatigue, unspecified type    2. Type 2 diabetes mellitus without complication, without long-term current use of insulin    3. ANTWAN (obstructive sleep apnea)            Plan:       I have changed Nick Valenzuelamarin Chinchilla's RYBELSUS. I am also having him maintain his flash glucose sensor (FREESTYLE DERICK 14 DAY SENSOR MISC), digoxin, losartan, ondansetron, pravastatin, FREESTYLE DERICK 14 DAY SENSOR, vitamin E, niacin, multivitamin with folic acid, ketoconazole, fexofenadine, cyanocobalamin, and cholecalciferol (vitamin D3).      Problem List Items Addressed This Visit        Endocrine    Type 2 diabetes mellitus without complication, without long-term current use of insulin     Increase Rybelsus to 1.5 tablets daily he will send me an update in the next several weeks to see if we can get his fastings at 1:20 a.m. and is post prandials closer to around 180-100.  If this is not working number going to have to increase him to a full 14 mg.  We also did an MMSE today he scored a perfect 30/30; advised on some supplements of alpha lipoic acid, Ag pro, B complex and vitamin D.           Relevant Medications    semaglutide (RYBELSUS) 7 mg tablet       Other    Fatigue - Primary     I think a lot of his fatigue is coming from large fluctuations in his sugar with sugar is high as around the times when he has noted that he is tired.  His sleep apnea machine has been adjusted and he is tolerating that at 14.  Also his labs were reviewed and he is not anemic and I have given him reassurance on that today           Relevant " Orders    TSH (Completed)    ANTWAN (obstructive sleep apnea)            Nick was seen today for diabetes and anemia.    Diagnoses and all orders for this visit:    Fatigue, unspecified type  -     TSH    Type 2 diabetes mellitus without complication, without long-term current use of insulin    ANTWAN (obstructive sleep apnea)    Other orders  -     semaglutide (RYBELSUS) 7 mg tablet; TAKE 1.5 TABS DAILY            Medications Ordered This Encounter   Medications    semaglutide (RYBELSUS) 7 mg tablet     Sig: TAKE 1.5 TABS DAILY     Dispense:  60 tablet     Refill:  2     [unfilled]  Orders Placed This Encounter   Procedures    TSH       Medication List with Changes/Refills   Current Medications    CHOLECALCIFEROL, VITAMIN D3, (VITAMIN D3) 10 MCG (400 UNIT) TAB    Take 400 Units by mouth once daily.    CYANOCOBALAMIN (VITAMIN B-12) 100 MCG TABLET    Take 100 mcg by mouth once daily.    DIGOXIN (LANOXIN) 125 MCG TABLET    Take 125 mcg by mouth.    FEXOFENADINE (ALLEGRA) 60 MG TABLET    Take 60 mg by mouth once daily.    FLASH GLUCOSE SENSOR (FREESTYLE WES 14 DAY SENSOR [facility] [place] [place])      freestyle wes sensor, See Instructions, change sensor out every 14 days, sub q, rotating sites, # 6 EA, 3 Refill(s), Pharmacy: [facility]/pharmacy #[id number], 166, cm, Height/Length Dosing, 06/07/21 15:37:00 CDT, 81.9, kg, Weight Dosing, 06/07/21 15:37:00 CDT    FREESTYLE WES 14 DAY SENSOR KIT    CHANGE SENSOR OUT EVERY 14 DAYS, SUB Q, ROTATING SITES    KETOCONAZOLE (NIZORAL) 2 % CREAM    1 application 2 (two) times daily. Apply to affected area    LOSARTAN (COZAAR) 25 MG TABLET    Take 25 mg by mouth.    MULTIVITAMIN WITH FOLIC ACID 400 MCG TAB    Take 1 tablet by mouth once daily.    NIACIN 500 MG TAB    Take 500 mg by mouth 2 (two) times daily with meals.    ONDANSETRON (ZOFRAN-ODT) 8 MG TBDL        PRAVASTATIN (PRAVACHOL) 80 MG TABLET    Take 80 mg by mouth once daily.    VITAMIN E 100 UNIT CAPSULE    Take 100 Units by mouth once daily.   Changed  and/or Refilled Medications    Modified Medication Previous Medication    SEMAGLUTIDE (RYBELSUS) 7 MG TABLET semaglutide (RYBELSUS) 7 mg tablet       TAKE 1.5 TABS DAILY    Take 1 tablet (7 mg total) by mouth once daily.      Medication List with Changes/Refills   Current Medications    CHOLECALCIFEROL, VITAMIN D3, (VITAMIN D3) 10 MCG (400 UNIT) TAB    Take 400 Units by mouth once daily.       Start Date: --        End Date: --    CYANOCOBALAMIN (VITAMIN B-12) 100 MCG TABLET    Take 100 mcg by mouth once daily.       Start Date: --        End Date: --    DIGOXIN (LANOXIN) 125 MCG TABLET    Take 125 mcg by mouth.       Start Date: --        End Date: --    FEXOFENADINE (ALLEGRA) 60 MG TABLET    Take 60 mg by mouth once daily.       Start Date: --        End Date: --    FLASH GLUCOSE SENSOR (FREESTYLE DERICK 14 DAY SENSOR MISC)      freestyle derick sensor, See Instructions, change sensor out every 14 days, sub q, rotating sites, # 6 EA, 3 Refill(s), Pharmacy: [facility]/pharmacy #[id number], 166, cm, Height/Length Dosing, 06/07/21 15:37:00 CDT, 81.9, kg, Weight Dosing, 06/07/21 15:37:00 CDT       Start Date: 7/26/2021 End Date: --    FREESTYLE DERICK 14 DAY SENSOR KIT    CHANGE SENSOR OUT EVERY 14 DAYS, SUB Q, ROTATING SITES       Start Date: 7/13/2022 End Date: --    KETOCONAZOLE (NIZORAL) 2 % CREAM    1 application 2 (two) times daily. Apply to affected area       Start Date: 7/14/2022 End Date: --    LOSARTAN (COZAAR) 25 MG TABLET    Take 25 mg by mouth.       Start Date: --        End Date: --    MULTIVITAMIN WITH FOLIC ACID 400 MCG TAB    Take 1 tablet by mouth once daily.       Start Date: --        End Date: --    NIACIN 500 MG TAB    Take 500 mg by mouth 2 (two) times daily with meals.       Start Date: 6/6/2022  End Date: --    ONDANSETRON (ZOFRAN-ODT) 8 MG TBDL           Start Date: 11/29/2021End Date: --    PRAVASTATIN (PRAVACHOL) 80 MG TABLET    Take 80 mg by mouth once daily.       Start Date: 5/6/2022  End Date: --     VITAMIN E 100 UNIT CAPSULE    Take 100 Units by mouth once daily.       Start Date: --        End Date: --   Changed and/or Refilled Medications    Modified Medication Previous Medication    SEMAGLUTIDE (RYBELSUS) 7 MG TABLET semaglutide (RYBELSUS) 7 mg tablet       TAKE 1.5 TABS DAILY    Take 1 tablet (7 mg total) by mouth once daily.       Start Date: 8/25/2022 End Date: --    Start Date: 5/31/2022 End Date: 8/25/2022            Follow up in about 3 months (around 11/25/2022) for with labs prior to visit.     An office visit for an established patient was performed. 10 minutes was used for reviewing the patients chart prior to the [facility] visit done on that same day. 15 minutes was used during the visit in regards to taking the patient history and physical exam. There was also an additional 5 minutes spent on education and counseling regarding medical conditions, current medications including risk/benefit and side effects/adverse events, vaccine counseling. After leaving the exam room, the provider then spent an additional 5 minutes completing the electronic health record.    The patient is receptive, expresses understanding and is agreeable to plan. All questions answered; total time spent was 35 minutes.

## 2022-08-25 NOTE — ASSESSMENT & PLAN NOTE
Increase Rybelsus to 1.5 tablets daily he will send me an update in the next several weeks to see if we can get his fastings at 1:20 a.m. and is post prandials closer to around 180-100.  If this is not working number going to have to increase him to a full 14 mg.  We also did an MMSE today he scored a perfect 30/30; advised on some supplements of alpha lipoic acid, Ag pro, B complex and vitamin D.

## 2022-08-25 NOTE — ASSESSMENT & PLAN NOTE
I think a lot of his fatigue is coming from large fluctuations in his sugar with sugar is high as around the times when he has noted that he is tired.  His sleep apnea machine has been adjusted and he is tolerating that at 14.  Also his labs were reviewed and he is not anemic and I have given him reassurance on that today

## 2022-08-26 ENCOUNTER — PATIENT MESSAGE (OUTPATIENT)
Dept: INTERNAL MEDICINE | Facility: CLINIC | Age: 72
End: 2022-08-26
Payer: COMMERCIAL

## 2022-08-26 ENCOUNTER — DOCUMENTATION ONLY (OUTPATIENT)
Dept: INTERNAL MEDICINE | Facility: CLINIC | Age: 72
End: 2022-08-26
Payer: COMMERCIAL

## 2022-08-29 ENCOUNTER — DOCUMENTATION ONLY (OUTPATIENT)
Dept: ADMINISTRATIVE | Facility: HOSPITAL | Age: 72
End: 2022-08-29
Payer: COMMERCIAL

## 2022-08-31 ENCOUNTER — DOCUMENTATION ONLY (OUTPATIENT)
Dept: INTERNAL MEDICINE | Facility: CLINIC | Age: 72
End: 2022-08-31
Payer: COMMERCIAL

## 2022-09-03 ENCOUNTER — DOCUMENTATION ONLY (OUTPATIENT)
Dept: INTERNAL MEDICINE | Facility: CLINIC | Age: 72
End: 2022-09-03
Payer: COMMERCIAL

## 2022-09-29 ENCOUNTER — TELEPHONE (OUTPATIENT)
Dept: INTERNAL MEDICINE | Facility: CLINIC | Age: 72
End: 2022-09-29
Payer: COMMERCIAL

## 2022-09-29 DIAGNOSIS — E11.9 TYPE 2 DIABETES MELLITUS WITHOUT COMPLICATION, WITHOUT LONG-TERM CURRENT USE OF INSULIN: Primary | ICD-10-CM

## 2022-09-29 RX ORDER — BLOOD-GLUCOSE TRANSMITTER
1 EACH MISCELLANEOUS DAILY
Qty: 1 EACH | Refills: 0 | Status: SHIPPED | OUTPATIENT
Start: 2022-09-29 | End: 2023-04-18

## 2022-09-29 RX ORDER — ORAL SEMAGLUTIDE 7 MG/1
TABLET ORAL
Qty: 60 TABLET | Refills: 2 | Status: SHIPPED | OUTPATIENT
Start: 2022-09-29 | End: 2023-03-01 | Stop reason: SDUPTHER

## 2022-09-29 RX ORDER — BLOOD-GLUCOSE SENSOR
1 EACH MISCELLANEOUS
Qty: 2 EACH | Refills: 6 | Status: SHIPPED | OUTPATIENT
Start: 2022-09-29 | End: 2023-04-18

## 2022-09-29 NOTE — TELEPHONE ENCOUNTER
----- Message from Fabiola Taveras sent at 9/29/2022  4:01 PM CDT -----  Pt's last visit 8.25.22, pt stated Med Rybelsus 7MG was taking 1.5 and Dr. Moon told him he could take up to 2 if needed.     Pt stated he has been taking 2 and it has been working for him.    Pt req a New Script for the Rybelsus (where he can start taking 2 daily) and just   HECTOR, pt stated he may need a PA    [facility] ([id number])

## 2022-10-01 ENCOUNTER — PATIENT MESSAGE (OUTPATIENT)
Dept: INTERNAL MEDICINE | Facility: CLINIC | Age: 72
End: 2022-10-01
Payer: COMMERCIAL

## 2022-10-21 ENCOUNTER — TELEPHONE (OUTPATIENT)
Dept: INTERNAL MEDICINE | Facility: CLINIC | Age: 72
End: 2022-10-21
Payer: COMMERCIAL

## 2022-10-21 NOTE — TELEPHONE ENCOUNTER
The Rob was denied and the sample did not work for the pt, [facility]

## 2022-10-21 NOTE — TELEPHONE ENCOUNTER
Insurance denied Dexcom due to pt not being on insulin pump or having to take 3+ insulin injections a day.

## 2022-10-21 NOTE — TELEPHONE ENCOUNTER
----- Message from Fabiola Taveras sent at 10/21/2022  7:53 AM CDT -----  PA needed for Dexcom g6 Sensor

## 2022-10-23 ENCOUNTER — PATIENT MESSAGE (OUTPATIENT)
Dept: INTERNAL MEDICINE | Facility: CLINIC | Age: 72
End: 2022-10-23
Payer: COMMERCIAL

## 2022-12-07 ENCOUNTER — PATIENT MESSAGE (OUTPATIENT)
Dept: INTERNAL MEDICINE | Facility: CLINIC | Age: 72
End: 2022-12-07

## 2022-12-30 ENCOUNTER — LAB VISIT (OUTPATIENT)
Dept: LAB | Facility: HOSPITAL | Age: 72
End: 2022-12-30
Attending: INTERNAL MEDICINE
Payer: COMMERCIAL

## 2022-12-30 DIAGNOSIS — E11.9 TYPE 2 DIABETES MELLITUS WITHOUT COMPLICATION, UNSPECIFIED WHETHER LONG TERM INSULIN USE: ICD-10-CM

## 2022-12-30 LAB
ANION GAP SERPL CALC-SCNC: 8 MEQ/L
BUN SERPL-MCNC: 19 MG/DL (ref 8.4–25.7)
CALCIUM SERPL-MCNC: 9.6 MG/DL (ref 8.8–10)
CHLORIDE SERPL-SCNC: 105 MMOL/L (ref 98–107)
CO2 SERPL-SCNC: 26 MMOL/L (ref 23–31)
CREAT SERPL-MCNC: 1.16 MG/DL (ref 0.73–1.18)
CREAT/UREA NIT SERPL: 16
EST. AVERAGE GLUCOSE BLD GHB EST-MCNC: 145.6 MG/DL
GFR SERPLBLD CREATININE-BSD FMLA CKD-EPI: >60 MLS/MIN/1.73/M2
GLUCOSE SERPL-MCNC: 158 MG/DL (ref 82–115)
HBA1C MFR BLD: 6.7 %
POTASSIUM SERPL-SCNC: 5.1 MMOL/L (ref 3.5–5.1)
SODIUM SERPL-SCNC: 139 MMOL/L (ref 136–145)

## 2022-12-30 PROCEDURE — 36415 COLL VENOUS BLD VENIPUNCTURE: CPT

## 2022-12-30 PROCEDURE — 80048 BASIC METABOLIC PNL TOTAL CA: CPT

## 2022-12-30 PROCEDURE — 83036 HEMOGLOBIN GLYCOSYLATED A1C: CPT

## 2023-01-10 ENCOUNTER — PATIENT MESSAGE (OUTPATIENT)
Dept: INTERNAL MEDICINE | Facility: CLINIC | Age: 73
End: 2023-01-10
Payer: COMMERCIAL

## 2023-01-17 ENCOUNTER — OFFICE VISIT (OUTPATIENT)
Dept: INTERNAL MEDICINE | Facility: CLINIC | Age: 73
End: 2023-01-17
Payer: COMMERCIAL

## 2023-01-17 VITALS
RESPIRATION RATE: 16 BRPM | BODY MASS INDEX: 28.12 KG/M2 | TEMPERATURE: 97 F | WEIGHT: 175 LBS | HEART RATE: 67 BPM | OXYGEN SATURATION: 100 % | SYSTOLIC BLOOD PRESSURE: 124 MMHG | DIASTOLIC BLOOD PRESSURE: 76 MMHG | HEIGHT: 66 IN

## 2023-01-17 DIAGNOSIS — E11.9 TYPE 2 DIABETES MELLITUS WITHOUT COMPLICATION, WITHOUT LONG-TERM CURRENT USE OF INSULIN: Primary | ICD-10-CM

## 2023-01-17 DIAGNOSIS — C64.2 CLEAR CELL CARCINOMA OF LEFT KIDNEY: ICD-10-CM

## 2023-01-17 PROBLEM — C64.9 CLEAR CELL CARCINOMA OF KIDNEY: Status: ACTIVE | Noted: 2023-01-17

## 2023-01-17 PROCEDURE — 3008F BODY MASS INDEX DOCD: CPT | Mod: CPTII,,, | Performed by: INTERNAL MEDICINE

## 2023-01-17 PROCEDURE — 1101F PR PT FALLS ASSESS DOC 0-1 FALLS W/OUT INJ PAST YR: ICD-10-PCS | Mod: CPTII,,, | Performed by: INTERNAL MEDICINE

## 2023-01-17 PROCEDURE — 3074F SYST BP LT 130 MM HG: CPT | Mod: CPTII,,, | Performed by: INTERNAL MEDICINE

## 2023-01-17 PROCEDURE — 1159F PR MEDICATION LIST DOCUMENTED IN MEDICAL RECORD: ICD-10-PCS | Mod: CPTII,,, | Performed by: INTERNAL MEDICINE

## 2023-01-17 PROCEDURE — 99214 PR OFFICE/OUTPT VISIT, EST, LEVL IV, 30-39 MIN: ICD-10-PCS | Mod: ,,, | Performed by: INTERNAL MEDICINE

## 2023-01-17 PROCEDURE — 3074F PR MOST RECENT SYSTOLIC BLOOD PRESSURE < 130 MM HG: ICD-10-PCS | Mod: CPTII,,, | Performed by: INTERNAL MEDICINE

## 2023-01-17 PROCEDURE — 3288F PR FALLS RISK ASSESSMENT DOCUMENTED: ICD-10-PCS | Mod: CPTII,,, | Performed by: INTERNAL MEDICINE

## 2023-01-17 PROCEDURE — 99214 OFFICE O/P EST MOD 30 MIN: CPT | Mod: ,,, | Performed by: INTERNAL MEDICINE

## 2023-01-17 PROCEDURE — 3008F PR BODY MASS INDEX (BMI) DOCUMENTED: ICD-10-PCS | Mod: CPTII,,, | Performed by: INTERNAL MEDICINE

## 2023-01-17 PROCEDURE — 3078F DIAST BP <80 MM HG: CPT | Mod: CPTII,,, | Performed by: INTERNAL MEDICINE

## 2023-01-17 PROCEDURE — 3078F PR MOST RECENT DIASTOLIC BLOOD PRESSURE < 80 MM HG: ICD-10-PCS | Mod: CPTII,,, | Performed by: INTERNAL MEDICINE

## 2023-01-17 PROCEDURE — 3288F FALL RISK ASSESSMENT DOCD: CPT | Mod: CPTII,,, | Performed by: INTERNAL MEDICINE

## 2023-01-17 PROCEDURE — 1159F MED LIST DOCD IN RCRD: CPT | Mod: CPTII,,, | Performed by: INTERNAL MEDICINE

## 2023-01-17 PROCEDURE — 1101F PT FALLS ASSESS-DOCD LE1/YR: CPT | Mod: CPTII,,, | Performed by: INTERNAL MEDICINE

## 2023-01-17 RX ORDER — CETIRIZINE HYDROCHLORIDE 5 MG/1
5 TABLET ORAL DAILY
COMMUNITY
End: 2023-08-01

## 2023-01-17 RX ORDER — NIACINAMIDE 500 MG
500 TABLET ORAL NIGHTLY
COMMUNITY

## 2023-01-17 NOTE — PROGRESS NOTES
Subjective:      Patient ID: Nick Son Jr is a 72 y.o. male.    Chief Complaint: Diabetes (3 month F/U)      HPI:  72 year old male here for diabetic revisit; A1c at 6.7  S/P EBUS of enlarged hilar node; results of which patient has not received he goes back on January 31st for those.  Weight 166; was 180 back in the summer  Fasting sugar 172; A1c 6.7  He has been cancer free for 5 years; 12/2017.   Had a contrast reaction 2 days after his last CT    CT 12/2022:   1.  Interval development of heterogenous hypervascular left perihilar adenopathy. Appearances raise strong suspicion for metastatic adenopathy related to renal cell carcinoma. Given the confounding history of lymphoma, tissue diagnosis may be considered for definitive characterization.   2.  Although some of the previously noted pulmonary nodules remain stable, there are also a few new small subcentimeter pulmonary nodules. 2 vertically these are indeterminate with differentials including infection, inflammation and metastases. Given the findings of intrathoracic adenopathy, suspicion for potential metastasis would have to be considered. Recommend continued close monitoring.   3.  A 8 mm high density nodule in the right kidney is slightly increased since prior study. Although this still remains too small to characterize, appearances do raise suspicion for renal cell carcinoma can be closely monitored.    Results of EBUS Bx on 1-6-23:   Diagnosis     Lymph node, left hilar, 10L, fine needle aspiration:                 METASTATIC CARCINOMA WITH CLEAR CELL FEATURES, CONSISTENT WITH PATIENT'S KNOWN RENAL PRIMARY (see comment)       Past Medical History:  Past Medical History:   Diagnosis Date    Diabetes mellitus, type 2     Personal history of colonic polyps      Past Surgical History:   Procedure Laterality Date    COLONOSCOPY  05/10/2017     Review of patient's allergies indicates:   Allergen Reactions    Meperidine Hives and Itching    Morphine Hives  and Itching    Propoxyphene n-acetaminophen Hives and Itching    Iodinated contrast media Itching and Rash     12/7/22 - C/o delayed reaction following day with rash & itching to L knee to feet.     Current Outpatient Medications on File Prior to Visit   Medication Sig Dispense Refill    blood-glucose sensor (DEXCOM G6 SENSOR) Maral 1 each by Misc.(Non-Drug; Combo Route) route every 14 (fourteen) days. 2 each 6    blood-glucose transmitter (DEXCOM G6 TRANSMITTER) Maral 1 each by Misc.(Non-Drug; Combo Route) route Daily. 1 each 0    cetirizine (ZYRTEC) 5 MG tablet Take 5 mg by mouth Daily.      cholecalciferol, vitamin D3, (VITAMIN D3) 10 mcg (400 unit) Tab Take 400 Units by mouth once daily.      cyanocobalamin (VITAMIN B-12) 100 MCG tablet Take 100 mcg by mouth once daily.      digoxin (LANOXIN) 125 mcg tablet Take 125 mcg by mouth.      fexofenadine (ALLEGRA) 60 MG tablet Take 60 mg by mouth once daily.      flash glucose sensor (FREESTYLE DERICK 14 DAY SENSOR [facility])   freestyle derick sensor, See Instructions, change sensor out every 14 days, sub q, rotating sites, # 6 EA, 3 Refill(s), Pharmacy: [facility]/pharmacy #[id number], 166, cm, Height/Length Dosing, 06/07/21 15:37:00 CDT, 81.9, kg, Weight Dosing, 06/07/21 15:37:00 CDT      FREESTYLE DERICK 14 DAY SENSOR Kit CHANGE SENSOR OUT EVERY 14 DAYS, SUB Q, ROTATING SITES 6 kit 3    ketoconazole (NIZORAL) 2 % cream 1 application 2 (two) times daily. Apply to affected area      losartan (COZAAR) 25 MG tablet Take 25 mg by mouth.      multivitamin with folic acid 400 mcg Tab Take 1 tablet by mouth once daily.      niacin 500 MG Tab Take 500 mg by mouth 2 (two) times daily with meals.      niacinamide 500 mg Tab Take 500 mg by mouth every evening.      ondansetron (ZOFRAN-ODT) 8 MG TbDL       pravastatin (PRAVACHOL) 80 MG tablet Take 80 mg by mouth once daily.      semaglutide (RYBELSUS) 7 mg tablet Takes 2 tabs daily. 60 tablet 2    vitamin E 100 UNIT capsule Take 100 Units by mouth  "once daily.       No current facility-administered medications on file prior to visit.     Social History     Socioeconomic History    Marital status:    Tobacco Use    Smoking status: Never    Smokeless tobacco: Never   Substance and Sexual Activity    Alcohol use: Yes     Comment: Occassioanlly    Drug use: Never    Sexual activity: Not Currently     History reviewed. No pertinent family history.    Review of Systems  A comprehensive review of systems was performed and was negative with exception of what is documented above.     Objective:   /76 (BP Location: Right arm, Patient Position: Sitting, BP Method: Medium (Manual))   Pulse 67   Temp 97.3 °F (36.3 °C) (Temporal)   Resp 16   Ht 5' 6" (1.676 m)   Wt 79.4 kg (175 lb)   SpO2 100%   BMI 28.25 kg/m²   Physical Exam    Assessment/ Plan:   1. Type 2 diabetes mellitus without complication, without long-term current use of insulin  Assessment & Plan:  At the expense of potentially putting him back on chemotherapy or biologic treatment he has 1 kidney in place we are going to keep his diabetic regimen the same.  His A1c is at 6.7 his weight is 166 he was 180 back in the summer he reports that his weight has kind of stabilized around this area.  We will not make any changes to his regimen until he goes back to [facility] with a game plan      2. Clear cell carcinoma of left kidney  Overview:  diagnosed 6/2017               Follow up in about 3 months (around 4/17/2023) for with labs prior to visit, DIABETIC REVISIT.      An office visit for an established patient was performed. 10 minutes was used for reviewing the patients chart prior to the [facility] visit done on that same day. 15 minutes was used during the visit in regards to taking the patient history and physical exam. There was also an additional 5 minutes spent on education and counseling regarding medical conditions, current medications including risk/benefit and side effects/adverse " events, vaccine counseling. After leaving the exam room, the provider then spent an additional 5 minutes completing the electronic health record.    The patient is receptive, expresses understanding and is agreeable to plan. All questions answered; total time spent was 35 minutes.

## 2023-01-17 NOTE — ASSESSMENT & PLAN NOTE
At the expense of potentially putting him back on chemotherapy or biologic treatment he has 1 kidney in place we are going to keep his diabetic regimen the same.  His A1c is at 6.7 his weight is 166 he was 180 back in the summer he reports that his weight has kind of stabilized around this area.  We will not make any changes to his regimen until he goes back to [facility] with a game plan

## 2023-03-01 ENCOUNTER — PATIENT MESSAGE (OUTPATIENT)
Dept: INTERNAL MEDICINE | Facility: CLINIC | Age: 73
End: 2023-03-01
Payer: COMMERCIAL

## 2023-03-01 ENCOUNTER — TELEPHONE (OUTPATIENT)
Dept: INTERNAL MEDICINE | Facility: CLINIC | Age: 73
End: 2023-03-01
Payer: COMMERCIAL

## 2023-03-01 DIAGNOSIS — E11.9 TYPE 2 DIABETES MELLITUS WITHOUT COMPLICATION, WITHOUT LONG-TERM CURRENT USE OF INSULIN: ICD-10-CM

## 2023-03-01 RX ORDER — ORAL SEMAGLUTIDE 7 MG/1
7 TABLET ORAL DAILY
Qty: 90 TABLET | Refills: 3 | Status: SHIPPED | OUTPATIENT
Start: 2023-03-01 | End: 2023-03-01 | Stop reason: SDUPTHER

## 2023-03-01 RX ORDER — ORAL SEMAGLUTIDE 7 MG/1
TABLET ORAL
Qty: 60 TABLET | Refills: 11 | Status: SHIPPED | OUTPATIENT
Start: 2023-03-01 | End: 2023-03-01 | Stop reason: SDUPTHER

## 2023-03-01 RX ORDER — ORAL SEMAGLUTIDE 7 MG/1
TABLET ORAL
Qty: 60 TABLET | Refills: 11 | Status: SHIPPED | OUTPATIENT
Start: 2023-03-01 | End: 2023-03-01

## 2023-03-01 RX ORDER — ORAL SEMAGLUTIDE 7 MG/1
TABLET ORAL
Qty: 60 TABLET | Refills: 2 | Status: SHIPPED | OUTPATIENT
Start: 2023-03-01 | End: 2023-03-01 | Stop reason: SDUPTHER

## 2023-03-01 RX ORDER — ORAL SEMAGLUTIDE 7 MG/1
7 TABLET ORAL DAILY
Qty: 90 TABLET | Refills: 3 | Status: SHIPPED | OUTPATIENT
Start: 2023-03-01 | End: 2023-03-02 | Stop reason: SDUPTHER

## 2023-03-01 NOTE — TELEPHONE ENCOUNTER
----- Message from Jones Bee sent at 3/1/2023  7:38 AM CST -----  Rybelsus 7 mg tab  Qty : 90   [facility] [address]

## 2023-03-02 ENCOUNTER — PATIENT MESSAGE (OUTPATIENT)
Dept: INTERNAL MEDICINE | Facility: CLINIC | Age: 73
End: 2023-03-02
Payer: COMMERCIAL

## 2023-03-02 DIAGNOSIS — E11.9 TYPE 2 DIABETES MELLITUS WITHOUT COMPLICATION, WITHOUT LONG-TERM CURRENT USE OF INSULIN: ICD-10-CM

## 2023-03-02 RX ORDER — ORAL SEMAGLUTIDE 7 MG/1
7 TABLET ORAL DAILY
Qty: 30 TABLET | Refills: 11 | Status: SHIPPED | OUTPATIENT
Start: 2023-03-02 | End: 2023-03-03 | Stop reason: SDUPTHER

## 2023-03-03 DIAGNOSIS — E11.9 TYPE 2 DIABETES MELLITUS WITHOUT COMPLICATION, WITHOUT LONG-TERM CURRENT USE OF INSULIN: ICD-10-CM

## 2023-03-03 RX ORDER — ORAL SEMAGLUTIDE 7 MG/1
7 TABLET ORAL DAILY
Qty: 30 TABLET | Refills: 11 | Status: SHIPPED | OUTPATIENT
Start: 2023-03-03 | End: 2023-08-01

## 2023-04-03 ENCOUNTER — TELEPHONE (OUTPATIENT)
Dept: INTERNAL MEDICINE | Facility: CLINIC | Age: 73
End: 2023-04-03
Payer: COMMERCIAL

## 2023-04-03 NOTE — TELEPHONE ENCOUNTER
----- Message from Ag Virgen sent at 4/3/2023  1:12 PM CDT -----  .Type:  Needs Medical Advice    Who Called: Rama from [facility]  Symptoms (please be specific):    How long has patient had these symptoms:    Pharmacy name and phone #:    Would the patient rather a call back or a response via [facility]?   Best Call Back Number: [phone]  Additional Information: She has a question about a script that was sent over.

## 2023-04-04 ENCOUNTER — PATIENT MESSAGE (OUTPATIENT)
Dept: INTERNAL MEDICINE | Facility: CLINIC | Age: 73
End: 2023-04-04
Payer: COMMERCIAL

## 2023-04-04 DIAGNOSIS — E11.9 TYPE 2 DIABETES MELLITUS WITHOUT COMPLICATION, WITHOUT LONG-TERM CURRENT USE OF INSULIN: Primary | ICD-10-CM

## 2023-04-04 NOTE — TELEPHONE ENCOUNTER
RX was sent as QD.   Spoke to pt's wife and she stated that she has the medication already, and was able to fill it this past weekend. PA not required at this time.

## 2023-04-17 ENCOUNTER — LAB VISIT (OUTPATIENT)
Dept: LAB | Facility: HOSPITAL | Age: 73
End: 2023-04-17
Attending: INTERNAL MEDICINE
Payer: COMMERCIAL

## 2023-04-17 DIAGNOSIS — E11.9 TYPE 2 DIABETES MELLITUS WITHOUT COMPLICATION, WITHOUT LONG-TERM CURRENT USE OF INSULIN: ICD-10-CM

## 2023-04-17 LAB
ALBUMIN SERPL-MCNC: 3.7 G/DL (ref 3.4–4.8)
ALBUMIN/GLOB SERPL: 1.4 RATIO (ref 1.1–2)
ALP SERPL-CCNC: 42 UNIT/L (ref 40–150)
ALT SERPL-CCNC: 20 UNIT/L (ref 0–55)
AST SERPL-CCNC: 17 UNIT/L (ref 5–34)
BILIRUBIN DIRECT+TOT PNL SERPL-MCNC: 0.6 MG/DL
BUN SERPL-MCNC: 14 MG/DL (ref 8.4–25.7)
CALCIUM SERPL-MCNC: 8.8 MG/DL (ref 8.8–10)
CHLORIDE SERPL-SCNC: 109 MMOL/L (ref 98–107)
CHOLEST SERPL-MCNC: 175 MG/DL
CHOLEST/HDLC SERPL: 4 {RATIO} (ref 0–5)
CO2 SERPL-SCNC: 26 MMOL/L (ref 23–31)
CREAT SERPL-MCNC: 1.07 MG/DL (ref 0.73–1.18)
CREAT UR-MCNC: 138.4 MG/DL (ref 63–166)
EST. AVERAGE GLUCOSE BLD GHB EST-MCNC: 139.9 MG/DL
GFR SERPLBLD CREATININE-BSD FMLA CKD-EPI: >60 MLS/MIN/1.73/M2
GLOBULIN SER-MCNC: 2.6 GM/DL (ref 2.4–3.5)
GLUCOSE SERPL-MCNC: 154 MG/DL (ref 82–115)
HBA1C MFR BLD: 6.5 %
HDLC SERPL-MCNC: 41 MG/DL (ref 35–60)
LDLC SERPL CALC-MCNC: 105 MG/DL (ref 50–140)
MICROALBUMIN UR-MCNC: 22.7 UG/ML
MICROALBUMIN/CREAT RATIO PNL UR: 16.4 MG/GM CR (ref 0–30)
POTASSIUM SERPL-SCNC: 4.6 MMOL/L (ref 3.5–5.1)
PROT SERPL-MCNC: 6.3 GM/DL (ref 5.8–7.6)
SODIUM SERPL-SCNC: 141 MMOL/L (ref 136–145)
TRIGL SERPL-MCNC: 143 MG/DL (ref 34–140)
VLDLC SERPL CALC-MCNC: 29 MG/DL

## 2023-04-17 PROCEDURE — 80061 LIPID PANEL: CPT

## 2023-04-17 PROCEDURE — 82043 UR ALBUMIN QUANTITATIVE: CPT

## 2023-04-17 PROCEDURE — 80053 COMPREHEN METABOLIC PANEL: CPT

## 2023-04-17 PROCEDURE — 83036 HEMOGLOBIN GLYCOSYLATED A1C: CPT

## 2023-04-17 PROCEDURE — 36415 COLL VENOUS BLD VENIPUNCTURE: CPT

## 2023-04-18 ENCOUNTER — OFFICE VISIT (OUTPATIENT)
Dept: INTERNAL MEDICINE | Facility: CLINIC | Age: 73
End: 2023-04-18
Payer: COMMERCIAL

## 2023-04-18 VITALS
BODY MASS INDEX: 26.68 KG/M2 | OXYGEN SATURATION: 98 % | HEIGHT: 66 IN | RESPIRATION RATE: 16 BRPM | SYSTOLIC BLOOD PRESSURE: 142 MMHG | TEMPERATURE: 97 F | DIASTOLIC BLOOD PRESSURE: 86 MMHG | HEART RATE: 60 BPM | WEIGHT: 166 LBS

## 2023-04-18 DIAGNOSIS — E46 MALNUTRITION RELATED TO CHRONIC DISEASE: Primary | ICD-10-CM

## 2023-04-18 DIAGNOSIS — E11.9 TYPE 2 DIABETES MELLITUS WITHOUT COMPLICATION, WITHOUT LONG-TERM CURRENT USE OF INSULIN: ICD-10-CM

## 2023-04-18 DIAGNOSIS — K14.6 TONGUE PAIN: ICD-10-CM

## 2023-04-18 DIAGNOSIS — E03.9 HYPOTHYROIDISM, UNSPECIFIED TYPE: ICD-10-CM

## 2023-04-18 PROCEDURE — 1159F MED LIST DOCD IN RCRD: CPT | Mod: CPTII,,, | Performed by: INTERNAL MEDICINE

## 2023-04-18 PROCEDURE — 3288F FALL RISK ASSESSMENT DOCD: CPT | Mod: CPTII,,, | Performed by: INTERNAL MEDICINE

## 2023-04-18 PROCEDURE — 4010F PR ACE/ARB THEARPY RXD/TAKEN: ICD-10-PCS | Mod: CPTII,,, | Performed by: INTERNAL MEDICINE

## 2023-04-18 PROCEDURE — 1159F PR MEDICATION LIST DOCUMENTED IN MEDICAL RECORD: ICD-10-PCS | Mod: CPTII,,, | Performed by: INTERNAL MEDICINE

## 2023-04-18 PROCEDURE — 3288F PR FALLS RISK ASSESSMENT DOCUMENTED: ICD-10-PCS | Mod: CPTII,,, | Performed by: INTERNAL MEDICINE

## 2023-04-18 PROCEDURE — 3066F NEPHROPATHY DOC TX: CPT | Mod: CPTII,,, | Performed by: INTERNAL MEDICINE

## 2023-04-18 PROCEDURE — 1101F PT FALLS ASSESS-DOCD LE1/YR: CPT | Mod: CPTII,,, | Performed by: INTERNAL MEDICINE

## 2023-04-18 PROCEDURE — 3061F PR NEG MICROALBUMINURIA RESULT DOCUMENTED/REVIEW: ICD-10-PCS | Mod: CPTII,,, | Performed by: INTERNAL MEDICINE

## 2023-04-18 PROCEDURE — 3079F PR MOST RECENT DIASTOLIC BLOOD PRESSURE 80-89 MM HG: ICD-10-PCS | Mod: CPTII,,, | Performed by: INTERNAL MEDICINE

## 2023-04-18 PROCEDURE — 3079F DIAST BP 80-89 MM HG: CPT | Mod: CPTII,,, | Performed by: INTERNAL MEDICINE

## 2023-04-18 PROCEDURE — 4010F ACE/ARB THERAPY RXD/TAKEN: CPT | Mod: CPTII,,, | Performed by: INTERNAL MEDICINE

## 2023-04-18 PROCEDURE — 99214 OFFICE O/P EST MOD 30 MIN: CPT | Mod: ,,, | Performed by: INTERNAL MEDICINE

## 2023-04-18 PROCEDURE — 3066F PR DOCUMENTATION OF TREATMENT FOR NEPHROPATHY: ICD-10-PCS | Mod: CPTII,,, | Performed by: INTERNAL MEDICINE

## 2023-04-18 PROCEDURE — 3008F BODY MASS INDEX DOCD: CPT | Mod: CPTII,,, | Performed by: INTERNAL MEDICINE

## 2023-04-18 PROCEDURE — 1101F PR PT FALLS ASSESS DOC 0-1 FALLS W/OUT INJ PAST YR: ICD-10-PCS | Mod: CPTII,,, | Performed by: INTERNAL MEDICINE

## 2023-04-18 PROCEDURE — 3077F SYST BP >= 140 MM HG: CPT | Mod: CPTII,,, | Performed by: INTERNAL MEDICINE

## 2023-04-18 PROCEDURE — 3061F NEG MICROALBUMINURIA REV: CPT | Mod: CPTII,,, | Performed by: INTERNAL MEDICINE

## 2023-04-18 PROCEDURE — 99214 PR OFFICE/OUTPT VISIT, EST, LEVL IV, 30-39 MIN: ICD-10-PCS | Mod: ,,, | Performed by: INTERNAL MEDICINE

## 2023-04-18 PROCEDURE — 3077F PR MOST RECENT SYSTOLIC BLOOD PRESSURE >= 140 MM HG: ICD-10-PCS | Mod: CPTII,,, | Performed by: INTERNAL MEDICINE

## 2023-04-18 PROCEDURE — 3008F PR BODY MASS INDEX (BMI) DOCUMENTED: ICD-10-PCS | Mod: CPTII,,, | Performed by: INTERNAL MEDICINE

## 2023-04-18 RX ORDER — LEVOTHYROXINE SODIUM 100 UG/1
1 TABLET ORAL DAILY
COMMUNITY
Start: 2023-04-11

## 2023-04-18 NOTE — PROGRESS NOTES
Subjective:      Patient ID: Nick Son Jr is a 72 y.o. male.    Chief Complaint: Diabetes (3 month F/U)      HPI:  72 year old male here for diabetic revisit; A1c at 6.5    ONCOLOGY HISTORY:  Oncology History   Kidney carcinoma, left (HCC)   2/28/2023 - Chemotherapy   The patient began OP Pembrolizumab every 3 weeks  Currently on Lenvatinib once a day    175 lb at last visit now at 166lb today  He tells me his tongue hurts when he eats  Has not tried anything yet  Recently started on Synthroid- will have labs done with Dr Wills  Taking 1 Lenvatinib a day- met with Dr Gonzalez this am  Goes back to [facility] in June          Past Medical History:  Past Medical History:   Diagnosis Date    Diabetes mellitus, type 2     Personal history of colonic polyps      Past Surgical History:   Procedure Laterality Date    COLONOSCOPY  05/10/2017     Review of patient's allergies indicates:   Allergen Reactions    Meperidine Hives and Itching    Morphine Hives and Itching    Propoxyphene n-acetaminophen Hives and Itching    Iodinated contrast media Itching and Rash     12/7/22 - C/o delayed reaction following day with rash & itching to L knee to feet.     Current Outpatient Medications on File Prior to Visit   Medication Sig Dispense Refill    cetirizine (ZYRTEC) 5 MG tablet Take 5 mg by mouth Daily.      cholecalciferol, vitamin D3, (VITAMIN D3) 10 mcg (400 unit) Tab Take 400 Units by mouth once daily.      cyanocobalamin (VITAMIN B-12) 100 MCG tablet Take 100 mcg by mouth once daily.      digoxin (LANOXIN) 125 mcg tablet Take 125 mcg by mouth.      fexofenadine (ALLEGRA) 60 MG tablet Take 60 mg by mouth once daily.      flash glucose sensor (FREESTYLE WES 14 DAY SENSOR [facility] [place])   freestyle wes sensor, See Instructions, change sensor out every 14 days, sub q, rotating sites, # 6 EA, 3 Refill(s), Pharmacy: [facility]/pharmacy #[id number], 166, cm, Height/Length Dosing, 06/07/21 15:37:00 CDT, 81.9, kg, Weight Dosing, 06/07/21 15:37:00  CDT      FREESTYLE DERICK 14 DAY SENSOR Kit CHANGE SENSOR OUT EVERY 14 DAYS, SUB Q, ROTATING SITES 6 kit 3    ketoconazole (NIZORAL) 2 % cream 1 application 2 (two) times daily. Apply to affected area      LENVIMA 20 mg/day (10 mg x 2) Cap Take 1 capsule by mouth once daily.      levothyroxine (SYNTHROID) 100 MCG tablet Take 1 tablet by mouth once daily.      losartan (COZAAR) 25 MG tablet Take 25 mg by mouth.      multivitamin with folic acid 400 mcg Tab Take 1 tablet by mouth once daily.      niacin 500 MG Tab Take 500 mg by mouth 2 (two) times daily with meals.      niacinamide 500 mg Tab Take 500 mg by mouth every evening.      ondansetron (ZOFRAN-ODT) 8 MG TbDL       pravastatin (PRAVACHOL) 80 MG tablet Take 80 mg by mouth once daily.      semaglutide (RYBELSUS) 7 mg tablet Take 1 tablet (7 mg total) by mouth once daily. 30 tablet 11    vitamin E 100 UNIT capsule Take 100 Units by mouth once daily.      [DISCONTINUED] blood-glucose sensor (DEXCOM G6 SENSOR) [place] 1 each by Misc.(Non-Drug; Combo Route) route every 14 (fourteen) days. (Patient not taking: Reported on 4/18/2023) 2 each 6    [DISCONTINUED] blood-glucose transmitter (DEXCOM G6 TRANSMITTER) [place] 1 each by Misc.(Non-Drug; Combo Route) route Daily. (Patient not taking: Reported on 4/18/2023) 1 each 0     No current facility-administered medications on file prior to visit.     Social History     Socioeconomic History    Marital status:    Tobacco Use    Smoking status: Never    Smokeless tobacco: Never   Substance and Sexual Activity    Alcohol use: Yes     Comment: Occassioanlly    Drug use: Never    Sexual activity: Not Currently     History reviewed. No pertinent family history.    Review of Systems  A comprehensive review of systems was performed and was negative with exception of what is documented above.     Objective:   BP (!) 142/86 (BP Location: Left arm, Patient Position: Sitting, BP Method: Medium (Manual))   Pulse 60   Temp 97.3 °F  "(36.3 °C) (Temporal)   Resp 16   Ht 5' 6" (1.676 m)   Wt 75.3 kg (166 lb)   SpO2 98%   BMI 26.79 kg/m²   Physical Exam  General : Alert and oriented, No acute distress, afebrile.  Eye : PERRLA. EOMI. Normal conjunctiva, Sclerae are nonicteric. No conjunctival injection, no pallor.  HEENT : Normocephalic/ atraumatic, Normal hearing, Oral mucosa is moist.  Respiratory : Respirations are non-labored and clear to auscultation bilaterally. Symmetrical air entry bilaterally, no crackles, no wheezes, no rhonchi. No cyanosis, no clubbing.  Cardiovascular : Normal rate, Regular rhythm. No murmurs, rubs, or gallops. Pulses are 2+ throughout. No JVD. No Edema.  Gastrointestinal : Soft, nontender, non-distended, bowel sounds are present in all quadrants, no organomegaly, no guarding, no rebound.  Musculoskeletal : Normal range of motion throughout. No muscle tenderness.  Integumentary : Warm, moist, intact.  Neurologic : Alert, Oriented  Psychiatric : Cooperative, Appropriate mood & affect.   Assessment/ Plan:   1. Malnutrition related to chronic disease    2. Tongue pain  Assessment & Plan:  Some magic mouthwash swish and spit prior to meals.      3. Type 2 diabetes mellitus without complication, without long-term current use of insulin  Assessment & Plan:  No plans to augment current regimen, will see him back in 3 months with repeat diabetic labs      4. Hypothyroidism, unspecified type  Assessment & Plan:  Being managed by Oncology.      Other orders  -     Discontinue: diphenhydrAMINE-aluminum-magnesium hydroxide-simethicone-LIDOcaine HCl 2%; Swish and spit 15 mLs before meals, at bedtime and at 0200.  Dispense: 1 each; Refill: 0  -     UNABLE TO FIND; Take 5 mLs by mouth 3 (three) times daily before meals. Swish thoroughly gargle and spit 5ml before meals.  Shake well before using.  Dispense: 400 mL; Refill: 3             Follow up in about 3 months (around 7/18/2023).      An office visit for an established patient " was performed. 10 minutes was used for reviewing the patients chart prior to the inoffice visit done on that same day. 15 minutes was used during the visit in regards to taking the patient history and physical exam. There was also an additional 5 minutes spent on education and counseling regarding medical conditions, current medications including risk/benefit and side effects/adverse events, vaccine counseling. After leaving the exam room, the provider then spent an additional 5 minutes completing the electronic health record.    The patient is receptive, expresses understanding and is agreeable to plan. All questions answered; total time spent was 35 minutes.

## 2023-04-19 ENCOUNTER — TELEPHONE (OUTPATIENT)
Dept: INTERNAL MEDICINE | Facility: CLINIC | Age: 73
End: 2023-04-19
Payer: COMMERCIAL

## 2023-04-19 NOTE — TELEPHONE ENCOUNTER
----- Message from H[facility] sent at 4/19/2023 12:41 PM CDT -----  .Type:  Pharmacy Calling to Clarify an RX    Name of Caller:[facility]  Pharmacy Name:[facility]  Prescription Name:mouth wash  What do they need to clarify?:mouth wash was never sent  Best Call Back Number:[phone]  Additional Information: mouth was was never sent

## 2023-04-20 DIAGNOSIS — K14.6 TONGUE PAIN: Primary | ICD-10-CM

## 2023-06-11 RX ORDER — PREDNISONE 50 MG/1
150 TABLET ORAL ONCE
Qty: 3 TABLET | Refills: 0 | Status: SHIPPED | OUTPATIENT
Start: 2023-06-11 | End: 2023-06-11

## 2023-07-11 ENCOUNTER — TELEPHONE (OUTPATIENT)
Dept: INTERNAL MEDICINE | Facility: CLINIC | Age: 73
End: 2023-07-11
Payer: COMMERCIAL

## 2023-07-11 DIAGNOSIS — E11.9 TYPE 2 DIABETES MELLITUS WITHOUT COMPLICATION, WITHOUT LONG-TERM CURRENT USE OF INSULIN: Primary | ICD-10-CM

## 2023-07-11 NOTE — TELEPHONE ENCOUNTER
----- Message from Russell Stoddard MA sent at 7/11/2023  9:32 AM CDT -----  Regarding: PV 7/18/23 @ 10:40 Dr. Moon  1. Are there any outstanding tasks in the patient's chart? Yes, fasting labs    2. Is there any documentation in the chart? No    3.Has patient been seen in an ER, Urgent care clinic, or been admitted since last visit?  If yes, When, where, and why    4. Has patient seen any other healthcare providers since last visit?  If yes, when, where, and why    5. Has patient had any bloodwork or XR done since last visit?    6. Is patient signed up for patient portal?

## 2023-07-18 ENCOUNTER — LAB VISIT (OUTPATIENT)
Dept: LAB | Facility: HOSPITAL | Age: 73
End: 2023-07-18
Attending: INTERNAL MEDICINE
Payer: COMMERCIAL

## 2023-07-18 ENCOUNTER — TELEPHONE (OUTPATIENT)
Dept: INTERNAL MEDICINE | Facility: CLINIC | Age: 73
End: 2023-07-18

## 2023-07-18 DIAGNOSIS — E87.5 SERUM POTASSIUM ELEVATED: Primary | ICD-10-CM

## 2023-07-18 DIAGNOSIS — E11.9 TYPE 2 DIABETES MELLITUS WITHOUT COMPLICATION, WITHOUT LONG-TERM CURRENT USE OF INSULIN: ICD-10-CM

## 2023-07-18 LAB
ALBUMIN SERPL-MCNC: 3.9 G/DL (ref 3.4–4.8)
ALBUMIN/GLOB SERPL: 1.1 RATIO (ref 1.1–2)
ALP SERPL-CCNC: 49 UNIT/L (ref 40–150)
ALT SERPL-CCNC: 23 UNIT/L (ref 0–55)
AST SERPL-CCNC: 23 UNIT/L (ref 5–34)
BILIRUBIN DIRECT+TOT PNL SERPL-MCNC: 0.6 MG/DL
BUN SERPL-MCNC: 19.3 MG/DL (ref 8.4–25.7)
CALCIUM SERPL-MCNC: 10.2 MG/DL (ref 8.8–10)
CHLORIDE SERPL-SCNC: 101 MMOL/L (ref 98–107)
CHOLEST SERPL-MCNC: 207 MG/DL
CHOLEST/HDLC SERPL: 5 {RATIO} (ref 0–5)
CO2 SERPL-SCNC: 30 MMOL/L (ref 23–31)
CREAT SERPL-MCNC: 1.11 MG/DL (ref 0.73–1.18)
CREAT UR-MCNC: 111.3 MG/DL (ref 63–166)
EST. AVERAGE GLUCOSE BLD GHB EST-MCNC: 137 MG/DL
GFR SERPLBLD CREATININE-BSD FMLA CKD-EPI: >60 MLS/MIN/1.73/M2
GLOBULIN SER-MCNC: 3.5 GM/DL (ref 2.4–3.5)
GLUCOSE SERPL-MCNC: 146 MG/DL (ref 82–115)
HBA1C MFR BLD: 6.4 %
HDLC SERPL-MCNC: 44 MG/DL (ref 35–60)
LDLC SERPL CALC-MCNC: 107 MG/DL (ref 50–140)
MICROALBUMIN UR-MCNC: 18.2 UG/ML
MICROALBUMIN/CREAT RATIO PNL UR: 16.4 MG/GM CR (ref 0–30)
POTASSIUM SERPL-SCNC: 6 MMOL/L (ref 3.5–5.1)
PROT SERPL-MCNC: 7.4 GM/DL (ref 5.8–7.6)
SODIUM SERPL-SCNC: 140 MMOL/L (ref 136–145)
TRIGL SERPL-MCNC: 278 MG/DL (ref 34–140)
VLDLC SERPL CALC-MCNC: 56 MG/DL

## 2023-07-18 PROCEDURE — 36415 COLL VENOUS BLD VENIPUNCTURE: CPT

## 2023-07-18 PROCEDURE — 82043 UR ALBUMIN QUANTITATIVE: CPT

## 2023-07-18 PROCEDURE — 80061 LIPID PANEL: CPT

## 2023-07-18 PROCEDURE — 80053 COMPREHEN METABOLIC PANEL: CPT

## 2023-07-18 PROCEDURE — 83036 HEMOGLOBIN GLYCOSYLATED A1C: CPT

## 2023-07-18 NOTE — TELEPHONE ENCOUNTER
----- Message from Ever Quezada MD sent at 7/18/2023  9:06 AM CDT -----  I need to stat redraw on his potassium came back at 6  ----- Message -----  From: Background User Lab  Sent: 7/18/2023   6:56 AM CDT  To: Ever Quezada MD

## 2023-07-18 NOTE — TELEPHONE ENCOUNTER
Spoke to pt. Pt Verbally confirmed understanding.  BMP ordered. He stated that he will go tomorrow morning. I did stress for him to go today. He stated that he did not drink a lot of water yesterday and this has happened to him in the past where he does not drink enough water his potassium is elevated. He then reiterated that he will go tomorrow morning fasting.

## 2023-07-19 ENCOUNTER — LAB VISIT (OUTPATIENT)
Dept: LAB | Facility: HOSPITAL | Age: 73
End: 2023-07-19
Attending: INTERNAL MEDICINE
Payer: COMMERCIAL

## 2023-07-19 DIAGNOSIS — E87.5 SERUM POTASSIUM ELEVATED: ICD-10-CM

## 2023-07-19 LAB
ANION GAP SERPL CALC-SCNC: 9 MEQ/L
BUN SERPL-MCNC: 21.2 MG/DL (ref 8.4–25.7)
CALCIUM SERPL-MCNC: 9.6 MG/DL (ref 8.8–10)
CHLORIDE SERPL-SCNC: 100 MMOL/L (ref 98–107)
CO2 SERPL-SCNC: 27 MMOL/L (ref 23–31)
CREAT SERPL-MCNC: 1.14 MG/DL (ref 0.73–1.18)
CREAT/UREA NIT SERPL: 19
GFR SERPLBLD CREATININE-BSD FMLA CKD-EPI: >60 MLS/MIN/1.73/M2
GLUCOSE SERPL-MCNC: 151 MG/DL (ref 82–115)
POTASSIUM SERPL-SCNC: 5.1 MMOL/L (ref 3.5–5.1)
SODIUM SERPL-SCNC: 136 MMOL/L (ref 136–145)

## 2023-07-19 PROCEDURE — 80048 BASIC METABOLIC PNL TOTAL CA: CPT

## 2023-07-19 PROCEDURE — 36415 COLL VENOUS BLD VENIPUNCTURE: CPT

## 2023-07-19 NOTE — PROGRESS NOTES
Please inform patient of results.    1. Potassium normalized.     Thanks for all you do,    Roberto Carlos

## 2023-07-26 ENCOUNTER — PATIENT MESSAGE (OUTPATIENT)
Dept: INTERNAL MEDICINE | Facility: CLINIC | Age: 73
End: 2023-07-26
Payer: MEDICARE

## 2023-08-01 ENCOUNTER — OFFICE VISIT (OUTPATIENT)
Dept: INTERNAL MEDICINE | Facility: CLINIC | Age: 73
End: 2023-08-01
Payer: COMMERCIAL

## 2023-08-01 VITALS
SYSTOLIC BLOOD PRESSURE: 128 MMHG | OXYGEN SATURATION: 99 % | BODY MASS INDEX: 24.11 KG/M2 | TEMPERATURE: 97 F | HEIGHT: 66 IN | WEIGHT: 150 LBS | HEART RATE: 70 BPM | DIASTOLIC BLOOD PRESSURE: 84 MMHG | RESPIRATION RATE: 16 BRPM

## 2023-08-01 DIAGNOSIS — E11.9 TYPE 2 DIABETES MELLITUS WITHOUT COMPLICATION, WITHOUT LONG-TERM CURRENT USE OF INSULIN: ICD-10-CM

## 2023-08-01 DIAGNOSIS — R19.7 DIARRHEA, UNSPECIFIED TYPE: Primary | ICD-10-CM

## 2023-08-01 PROCEDURE — 3044F PR MOST RECENT HEMOGLOBIN A1C LEVEL <7.0%: ICD-10-PCS | Mod: CPTII,,, | Performed by: INTERNAL MEDICINE

## 2023-08-01 PROCEDURE — 99214 PR OFFICE/OUTPT VISIT, EST, LEVL IV, 30-39 MIN: ICD-10-PCS | Mod: ,,, | Performed by: INTERNAL MEDICINE

## 2023-08-01 PROCEDURE — 3079F PR MOST RECENT DIASTOLIC BLOOD PRESSURE 80-89 MM HG: ICD-10-PCS | Mod: CPTII,,, | Performed by: INTERNAL MEDICINE

## 2023-08-01 PROCEDURE — 4010F PR ACE/ARB THEARPY RXD/TAKEN: ICD-10-PCS | Mod: CPTII,,, | Performed by: INTERNAL MEDICINE

## 2023-08-01 PROCEDURE — 3066F PR DOCUMENTATION OF TREATMENT FOR NEPHROPATHY: ICD-10-PCS | Mod: CPTII,,, | Performed by: INTERNAL MEDICINE

## 2023-08-01 PROCEDURE — 3288F PR FALLS RISK ASSESSMENT DOCUMENTED: ICD-10-PCS | Mod: CPTII,,, | Performed by: INTERNAL MEDICINE

## 2023-08-01 PROCEDURE — 3008F BODY MASS INDEX DOCD: CPT | Mod: CPTII,,, | Performed by: INTERNAL MEDICINE

## 2023-08-01 PROCEDURE — 3066F NEPHROPATHY DOC TX: CPT | Mod: CPTII,,, | Performed by: INTERNAL MEDICINE

## 2023-08-01 PROCEDURE — 1101F PT FALLS ASSESS-DOCD LE1/YR: CPT | Mod: CPTII,,, | Performed by: INTERNAL MEDICINE

## 2023-08-01 PROCEDURE — 99214 OFFICE O/P EST MOD 30 MIN: CPT | Mod: ,,, | Performed by: INTERNAL MEDICINE

## 2023-08-01 PROCEDURE — 3074F SYST BP LT 130 MM HG: CPT | Mod: CPTII,,, | Performed by: INTERNAL MEDICINE

## 2023-08-01 PROCEDURE — 4010F ACE/ARB THERAPY RXD/TAKEN: CPT | Mod: CPTII,,, | Performed by: INTERNAL MEDICINE

## 2023-08-01 PROCEDURE — 3061F NEG MICROALBUMINURIA REV: CPT | Mod: CPTII,,, | Performed by: INTERNAL MEDICINE

## 2023-08-01 PROCEDURE — 1159F PR MEDICATION LIST DOCUMENTED IN MEDICAL RECORD: ICD-10-PCS | Mod: CPTII,,, | Performed by: INTERNAL MEDICINE

## 2023-08-01 PROCEDURE — 3008F PR BODY MASS INDEX (BMI) DOCUMENTED: ICD-10-PCS | Mod: CPTII,,, | Performed by: INTERNAL MEDICINE

## 2023-08-01 PROCEDURE — 3288F FALL RISK ASSESSMENT DOCD: CPT | Mod: CPTII,,, | Performed by: INTERNAL MEDICINE

## 2023-08-01 PROCEDURE — 3079F DIAST BP 80-89 MM HG: CPT | Mod: CPTII,,, | Performed by: INTERNAL MEDICINE

## 2023-08-01 PROCEDURE — 3044F HG A1C LEVEL LT 7.0%: CPT | Mod: CPTII,,, | Performed by: INTERNAL MEDICINE

## 2023-08-01 PROCEDURE — 1159F MED LIST DOCD IN RCRD: CPT | Mod: CPTII,,, | Performed by: INTERNAL MEDICINE

## 2023-08-01 PROCEDURE — 3061F PR NEG MICROALBUMINURIA RESULT DOCUMENTED/REVIEW: ICD-10-PCS | Mod: CPTII,,, | Performed by: INTERNAL MEDICINE

## 2023-08-01 PROCEDURE — 1101F PR PT FALLS ASSESS DOC 0-1 FALLS W/OUT INJ PAST YR: ICD-10-PCS | Mod: CPTII,,, | Performed by: INTERNAL MEDICINE

## 2023-08-01 PROCEDURE — 3074F PR MOST RECENT SYSTOLIC BLOOD PRESSURE < 130 MM HG: ICD-10-PCS | Mod: CPTII,,, | Performed by: INTERNAL MEDICINE

## 2023-08-01 NOTE — ASSESSMENT & PLAN NOTE
Discontinue Rybelsus patient has lost an additional 16 lb since his last visit   I want him to call me with an update on Thursday of this week if fasting sugars are higher than 150 we are going to start him on some glipizide 5 mg.

## 2023-08-01 NOTE — PROGRESS NOTES
Subjective:      Patient ID: Nick Son Jr is a 72 y.o. male.    Chief Complaint: Diabetes (3 month F/U)      HPI:  72 year old male here for diabetic revisit  Down 16lbs since his last visit  He has diarrhea  Food taste terrible   His last scans were done in June his dose of chemo that he is taking by mouth was increased from 10 mg to 15 he is intolerant to 20 mg secondary to just feeling fatigued.  He states that everything he eats taste terrible and everything smells terrible   A1c at 6.5 fasting sugar at 150 currently on oral G LP 1 at 7 mg        Past Medical History:  Past Medical History:   Diagnosis Date    Diabetes mellitus, type 2     Personal history of colonic polyps      Past Surgical History:   Procedure Laterality Date    APPENDECTOMY      COLONOSCOPY  05/10/2017    FRACTURE SURGERY  Age 11    Broke right arm    KIDNEY TRANSPLANT  2017    Right kidney removed.   No transplant    SPINE SURGERY  has had 5 surgeries     Review of patient's allergies indicates:   Allergen Reactions    Meperidine Hives and Itching    Morphine Hives and Itching    Propoxyphene n-acetaminophen Hives and Itching    Iodinated contrast media Itching and Rash     12/7/22 - C/o delayed reaction following day with rash & itching to L knee to feet.     Current Outpatient Medications on File Prior to Visit   Medication Sig Dispense Refill    cholecalciferol, vitamin D3, (VITAMIN D3) 10 mcg (400 unit) Tab Take 400 Units by mouth once daily.      cyanocobalamin (VITAMIN B-12) 100 MCG tablet Take 100 mcg by mouth once daily.      digoxin (LANOXIN) 125 mcg tablet Take 125 mcg by mouth.      flash glucose sensor (FREESTYLE WES 14 DAY SENSOR [facility] [place])   freestyle wes sensor, See Instructions, change sensor out every 14 days, sub q, rotating sites, # 6 EA, 3 Refill(s), Pharmacy: [facility]/pharmacy #[id number], 166, cm, Height/Length Dosing, 06/07/21 15:37:00 CDT, 81.9, kg, Weight Dosing, 06/07/21 15:37:00 CDT      LENVIMA 20 mg/day (10 mg x 2) Cap  Take 1 capsule by mouth once daily.      levothyroxine (SYNTHROID) 100 MCG tablet Take 1 tablet by mouth once daily.      losartan (COZAAR) 25 MG tablet Take 25 mg by mouth.      multivitamin with folic acid 400 mcg Tab Take 1 tablet by mouth once daily.      niacinamide 500 mg Tab Take 500 mg by mouth every evening.      pravastatin (PRAVACHOL) 80 MG tablet Take 40 mg by mouth once daily.      vitamin E 100 UNIT capsule Take 100 Units by mouth once daily.      [DISCONTINUED] semaglutide (RYBELSUS) 7 mg tablet Take 1 tablet (7 mg total) by mouth once daily. 30 tablet 11    [DISCONTINUED] cetirizine (ZYRTEC) 5 MG tablet Take 5 mg by mouth Daily.      [DISCONTINUED] fexofenadine (ALLEGRA) 60 MG tablet Take 60 mg by mouth once daily.      [DISCONTINUED] FREESTYLE DERICK 14 DAY SENSOR Kit CHANGE SENSOR OUT EVERY 14 DAYS, SUB Q, ROTATING SITES 6 kit 3    [DISCONTINUED] ketoconazole (NIZORAL) 2 % cream 1 application 2 (two) times daily. Apply to affected area      [DISCONTINUED] niacin 500 MG Tab Take 500 mg by mouth 2 (two) times daily with meals.      [DISCONTINUED] ondansetron (ZOFRAN-ODT) 8 MG TbDL        No current facility-administered medications on file prior to visit.     Social History     Socioeconomic History    Marital status:    Tobacco Use    Smoking status: Never    Smokeless tobacco: Never   Substance and Sexual Activity    Alcohol use: Yes     Comment: Occassioanlly    Drug use: Never    Sexual activity: Yes     Partners: Female     Birth control/protection: None     Social Determinants of Health     Financial Resource Strain: Low Risk  (8/1/2023)    Overall Financial Resource Strain (CARDIA)     Difficulty of Paying Living Expenses: Not hard at all   Food Insecurity: No Food Insecurity (8/1/2023)    Hunger Vital Sign     Worried About Running Out of Food in the Last Year: Never true     Ran Out of Food in the Last Year: Never true   Transportation Needs: No Transportation Needs (8/1/2023)     "PRAPARE - Transportation     Lack of Transportation (Medical): No     Lack of Transportation (Non-Medical): No   Physical Activity: Sufficiently Active (8/1/2023)    Exercise Vital Sign     Days of Exercise per Week: 7 days     Minutes of Exercise per Session: 60 min   Stress: No Stress Concern Present (8/1/2023)    [employer] [place] [employer] - Occupational Stress Questionnaire     Feeling of Stress : Not at all   Social Connections: Socially Integrated (8/1/2023)    Social Connection and Isolation Panel [NHANES]     Frequency of Communication with Friends and Family: More than three times a week     Frequency of Social Gatherings with Friends and Family: More than three times a week     Attends Mormon Services: More than 4 times per year     Active Member of Clubs or Organizations: Yes     Attends Club or Organization Meetings: More than 4 times per year     Marital Status:    Housing Stability: Low Risk  (8/1/2023)    Housing Stability Vital Sign     Unable to Pay for Housing in the Last Year: No     Number of Places Lived in the Last Year: 1     Unstable Housing in the Last Year: No     Family History   Problem Relation Age of Onset    Cancer Father         cancer of esphogus    Cancer Paternal Uncle         Kidney Cancer       Review of Systems  A comprehensive review of systems was performed and was negative with exception of what is documented above.     Objective:   /84 (BP Location: Left arm, Patient Position: Sitting, BP Method: Medium (Manual))   Pulse 70   Temp 97.3 °F (36.3 °C) (Temporal)   Resp 16   Ht 5' 6" (1.676 m)   Wt 68 kg (150 lb)   SpO2 99%   BMI 24.21 kg/m²   Physical Exam  General : Alert and oriented, No acute distress, afebrile.  Eye : PERRLA. EOMI. Normal conjunctiva, Sclerae are nonicteric. .  Respiratory : Respirations are non-labored and clear to auscultation bilaterally. Symmetrical air entry bilaterally, no crackles, no wheezes, no rhonchi. No " cyanosis, no clubbing.  Cardiovascular : Normal rate, Regular rhythm. No murmurs, rubs, or gallops. Pulses are 2+ throughout. No JVD. No Edema.  Gastrointestinal : Soft, nontender, non-distended, bowel sounds are present in all quadrants, no organomegaly, no guarding, no rebound.  Musculoskeletal : Normal range of motion throughout. No muscle tenderness.  Integumentary : Warm, moist, intact.  Neurologic : Alert, Oriented  Psychiatric : Cooperative, Appropriate mood & affect.   Assessment/ Plan:   1. Diarrhea, unspecified type  Assessment & Plan:    Trial of Xifaxan b.i.d. for 10 days    Orders:  -     rifAXIMin (XIFAXAN) 550 mg Tab; Take 1 tablet (550 mg total) by mouth 2 (two) times daily. for 10 days  Dispense: 20 tablet; Refill: 0    2. Type 2 diabetes mellitus without complication, without long-term current use of insulin  Assessment & Plan:   Discontinue Rybelsus patient has lost an additional 16 lb since his last visit   I want him to call me with an update on Thursday of this week if fasting sugars are higher than 150 we are going to start him on some glipizide 5 mg.      Other orders  -     Discontinue: rifAXIMin (XIFAXAN) 550 mg Tab; Take 1 tablet (550 mg total) by mouth 2 (two) times daily. for 10 days  Dispense: 20 tablet; Refill: 0             Follow up in about 2 weeks (around 8/15/2023).

## 2023-08-17 ENCOUNTER — OFFICE VISIT (OUTPATIENT)
Dept: INTERNAL MEDICINE | Facility: CLINIC | Age: 73
End: 2023-08-17
Payer: MEDICARE

## 2023-08-17 ENCOUNTER — TELEPHONE (OUTPATIENT)
Dept: INTERNAL MEDICINE | Facility: CLINIC | Age: 73
End: 2023-08-17

## 2023-08-17 VITALS
BODY MASS INDEX: 25.39 KG/M2 | RESPIRATION RATE: 16 BRPM | HEART RATE: 70 BPM | HEIGHT: 66 IN | OXYGEN SATURATION: 99 % | WEIGHT: 158 LBS | DIASTOLIC BLOOD PRESSURE: 80 MMHG | SYSTOLIC BLOOD PRESSURE: 122 MMHG | TEMPERATURE: 97 F

## 2023-08-17 DIAGNOSIS — R19.7 DIARRHEA, UNSPECIFIED TYPE: ICD-10-CM

## 2023-08-17 DIAGNOSIS — E11.9 TYPE 2 DIABETES MELLITUS WITHOUT COMPLICATION, WITHOUT LONG-TERM CURRENT USE OF INSULIN: Primary | ICD-10-CM

## 2023-08-17 PROCEDURE — 3079F DIAST BP 80-89 MM HG: CPT | Mod: CPTII,,, | Performed by: INTERNAL MEDICINE

## 2023-08-17 PROCEDURE — 4010F PR ACE/ARB THEARPY RXD/TAKEN: ICD-10-PCS | Mod: CPTII,,, | Performed by: INTERNAL MEDICINE

## 2023-08-17 PROCEDURE — 3066F PR DOCUMENTATION OF TREATMENT FOR NEPHROPATHY: ICD-10-PCS | Mod: CPTII,,, | Performed by: INTERNAL MEDICINE

## 2023-08-17 PROCEDURE — 1101F PT FALLS ASSESS-DOCD LE1/YR: CPT | Mod: CPTII,,, | Performed by: INTERNAL MEDICINE

## 2023-08-17 PROCEDURE — 3008F PR BODY MASS INDEX (BMI) DOCUMENTED: ICD-10-PCS | Mod: CPTII,,, | Performed by: INTERNAL MEDICINE

## 2023-08-17 PROCEDURE — 3074F SYST BP LT 130 MM HG: CPT | Mod: CPTII,,, | Performed by: INTERNAL MEDICINE

## 2023-08-17 PROCEDURE — 3288F PR FALLS RISK ASSESSMENT DOCUMENTED: ICD-10-PCS | Mod: CPTII,,, | Performed by: INTERNAL MEDICINE

## 2023-08-17 PROCEDURE — 1159F MED LIST DOCD IN RCRD: CPT | Mod: CPTII,,, | Performed by: INTERNAL MEDICINE

## 2023-08-17 PROCEDURE — 1160F RVW MEDS BY RX/DR IN RCRD: CPT | Mod: CPTII,,, | Performed by: INTERNAL MEDICINE

## 2023-08-17 PROCEDURE — 4010F ACE/ARB THERAPY RXD/TAKEN: CPT | Mod: CPTII,,, | Performed by: INTERNAL MEDICINE

## 2023-08-17 PROCEDURE — 3044F HG A1C LEVEL LT 7.0%: CPT | Mod: CPTII,,, | Performed by: INTERNAL MEDICINE

## 2023-08-17 PROCEDURE — 99213 OFFICE O/P EST LOW 20 MIN: CPT | Mod: ,,, | Performed by: INTERNAL MEDICINE

## 2023-08-17 PROCEDURE — 3061F PR NEG MICROALBUMINURIA RESULT DOCUMENTED/REVIEW: ICD-10-PCS | Mod: CPTII,,, | Performed by: INTERNAL MEDICINE

## 2023-08-17 PROCEDURE — 3061F NEG MICROALBUMINURIA REV: CPT | Mod: CPTII,,, | Performed by: INTERNAL MEDICINE

## 2023-08-17 PROCEDURE — 1101F PR PT FALLS ASSESS DOC 0-1 FALLS W/OUT INJ PAST YR: ICD-10-PCS | Mod: CPTII,,, | Performed by: INTERNAL MEDICINE

## 2023-08-17 PROCEDURE — 3074F PR MOST RECENT SYSTOLIC BLOOD PRESSURE < 130 MM HG: ICD-10-PCS | Mod: CPTII,,, | Performed by: INTERNAL MEDICINE

## 2023-08-17 PROCEDURE — 99213 PR OFFICE/OUTPT VISIT, EST, LEVL III, 20-29 MIN: ICD-10-PCS | Mod: ,,, | Performed by: INTERNAL MEDICINE

## 2023-08-17 PROCEDURE — 3288F FALL RISK ASSESSMENT DOCD: CPT | Mod: CPTII,,, | Performed by: INTERNAL MEDICINE

## 2023-08-17 PROCEDURE — 3044F PR MOST RECENT HEMOGLOBIN A1C LEVEL <7.0%: ICD-10-PCS | Mod: CPTII,,, | Performed by: INTERNAL MEDICINE

## 2023-08-17 PROCEDURE — 3008F BODY MASS INDEX DOCD: CPT | Mod: CPTII,,, | Performed by: INTERNAL MEDICINE

## 2023-08-17 PROCEDURE — 3079F PR MOST RECENT DIASTOLIC BLOOD PRESSURE 80-89 MM HG: ICD-10-PCS | Mod: CPTII,,, | Performed by: INTERNAL MEDICINE

## 2023-08-17 PROCEDURE — 1159F PR MEDICATION LIST DOCUMENTED IN MEDICAL RECORD: ICD-10-PCS | Mod: CPTII,,, | Performed by: INTERNAL MEDICINE

## 2023-08-17 PROCEDURE — 3066F NEPHROPATHY DOC TX: CPT | Mod: CPTII,,, | Performed by: INTERNAL MEDICINE

## 2023-08-17 PROCEDURE — 1160F PR REVIEW ALL MEDS BY PRESCRIBER/CLIN PHARMACIST DOCUMENTED: ICD-10-PCS | Mod: CPTII,,, | Performed by: INTERNAL MEDICINE

## 2023-08-17 RX ORDER — FLASH GLUCOSE SENSOR
1 KIT MISCELLANEOUS
Qty: 2 KIT | Refills: 11 | Status: SHIPPED | OUTPATIENT
Start: 2023-08-17 | End: 2024-03-27 | Stop reason: SDUPTHER

## 2023-08-17 RX ORDER — FLASH GLUCOSE SENSOR
1 KIT MISCELLANEOUS
Qty: 2 KIT | Refills: 3 | Status: SHIPPED | OUTPATIENT
Start: 2023-08-17 | End: 2023-08-17

## 2023-08-17 RX ORDER — FLASH GLUCOSE SENSOR
1 KIT MISCELLANEOUS
Qty: 2 KIT | Refills: 3 | Status: SHIPPED | OUTPATIENT
Start: 2023-08-17 | End: 2023-08-17 | Stop reason: SDUPTHER

## 2023-08-17 NOTE — ASSESSMENT & PLAN NOTE
Currently on no management he has an appetite and has gained 8 lb but states that his appetite returned after he discontinued his chemo pill he got a 14 day break and will be resuming so I do not want to start a new diabetic medicine or resume his Rybelsus right now I want to put a monitor on him and see what his sugars look like if his fasting sugars are getting upwards in the 130s and 150s then we are going to have to put him back on something but maybe something that would increase his appetite like a little bit of glipizide or low-dose Actos  We  will see him back in the office in 6 weeks with labs

## 2023-08-17 NOTE — PROGRESS NOTES
Subjective:      Patient ID: Nick Son Jr is a 72 y.o. male.    Chief Complaint: Follow-up (2 week F/U)      HPI:  72 year old male here for diabetic revisit  Down 16lbs since his last visit  He has diarrhea  Food taste terrible   His last scans were done in June his dose of chemo that he is taking by mouth was increased from 10 mg to 15 he is intolerant to 20 mg secondary to just feeling fatigued.  He states that everything he eats taste terrible and everything smells terrible   A1c at 6.5 fasting sugar at 150 currently on oral G LP 1 at 7 mg    Since his last visit we discontinued his Rybelsus he also discontinued his chemo for 14 days his weight is up 8 lb he has no nausea we treated him with Xifaxan for diarrhea all of those symptoms have resolved unfortunately he has not had a glucose monitor in the past 2 weeks so I do not really know how his blood sugar is off of the rib else's but he states he checked it a few times in his nonfasting are 150 and is fastings are around 125    Past Medical History:  Past Medical History:   Diagnosis Date    Diabetes mellitus, type 2     Personal history of colonic polyps      Past Surgical History:   Procedure Laterality Date    APPENDECTOMY      COLONOSCOPY  05/10/2017    FRACTURE SURGERY  Age 11    Broke right arm    KIDNEY TRANSPLANT  2017    Right kidney removed.   No transplant    SPINE SURGERY  has had 5 surgeries     Review of patient's allergies indicates:   Allergen Reactions    Meperidine Hives and Itching    Morphine Hives and Itching    Propoxyphene n-acetaminophen Hives and Itching    Iodinated contrast media Itching and Rash     12/7/22 - C/o delayed reaction following day with rash & itching to L knee to feet.     Current Outpatient Medications on File Prior to Visit   Medication Sig Dispense Refill    cholecalciferol, vitamin D3, (VITAMIN D3) 10 mcg (400 unit) Tab Take 400 Units by mouth once daily.      cyanocobalamin (VITAMIN B-12) 100 MCG tablet Take 100  mcg by mouth once daily.      digoxin (LANOXIN) 125 mcg tablet Take 125 mcg by mouth.      LENVIMA 20 mg/day (10 mg x 2) Cap Take 1 capsule by mouth once daily.      levothyroxine (SYNTHROID) 100 MCG tablet Take 1 tablet by mouth once daily.      losartan (COZAAR) 25 MG tablet Take 25 mg by mouth.      multivitamin with folic acid 400 mcg Tab Take 1 tablet by mouth once daily.      niacinamide 500 mg Tab Take 500 mg by mouth every evening.      pravastatin (PRAVACHOL) 80 MG tablet Take 40 mg by mouth once daily.      vitamin E 100 UNIT capsule Take 100 Units by mouth once daily.      [DISCONTINUED] flash glucose sensor (FREESTYLE DERICK 14 DAY SENSOR [facility] [place]   freestyle derick sensor, See Instructions, change sensor out every 14 days, sub q, rotating sites, # 6 EA, 3 Refill(s), Pharmacy: [facility]/pharmacy #[id number], 166, cm, Height/Length Dosing, 06/07/21 15:37:00 CDT, 81.9, kg, Weight Dosing, 06/07/21 15:37:00 CDT       No current facility-administered medications on file prior to visit.     Social History     Socioeconomic History    Marital status:    Tobacco Use    Smoking status: Never    Smokeless tobacco: Never   Substance and Sexual Activity    Alcohol use: Yes     Comment: Occassioanlly    Drug use: Never    Sexual activity: Yes     Partners: Female     Birth control/protection: None     Social Determinants of Health     Financial Resource Strain: Low Risk  (8/1/2023)    Overall Financial Resource Strain (CARDIA)     Difficulty of Paying Living Expenses: Not hard at all   Food Insecurity: No Food Insecurity (8/1/2023)    Hunger Vital Sign     Worried About Running Out of Food in the Last Year: Never true     Ran Out of Food in the Last Year: Never true   Transportation Needs: No Transportation Needs (8/1/2023)    PRAPARE - Transportation     Lack of Transportation (Medical): No     Lack of Transportation (Non-Medical): No   Physical Activity: Sufficiently Active (8/1/2023)    Exercise Vital Sign     Days of  "Exercise per Week: 7 days     Minutes of Exercise per Session: 60 min   Stress: No Stress Concern Present (8/1/2023)    [employer] [place] [employer] - Occupational Stress Questionnaire     Feeling of Stress : Not at all   Social Connections: Socially Integrated (8/1/2023)    Social Connection and Isolation Panel [NHANES]     Frequency of Communication with Friends and Family: More than three times a week     Frequency of Social Gatherings with Friends and Family: More than three times a week     Attends Hindu Services: More than 4 times per year     Active Member of Clubs or Organizations: Yes     Attends Club or Organization Meetings: More than 4 times per year     Marital Status:    Housing Stability: Low Risk  (8/1/2023)    Housing Stability Vital Sign     Unable to Pay for Housing in the Last Year: No     Number of Places Lived in the Last Year: 1     Unstable Housing in the Last Year: No     Family History   Problem Relation Age of Onset    Cancer Father         cancer of esphogus    Cancer Paternal Uncle         Kidney Cancer       Review of Systems  A comprehensive review of systems was performed and was negative with exception of what is documented above.     Objective:   /80 (BP Location: Left arm, Patient Position: Sitting, BP Method: Medium (Manual))   Pulse 70   Temp 97.3 °F (36.3 °C) (Temporal)   Resp 16   Ht 5' 6" (1.676 m)   Wt 71.7 kg (158 lb)   SpO2 99%   BMI 25.50 kg/m²   Physical Exam  General : Alert and oriented, No acute distress, afebrile.  Eye : PERRLA. EOMI. Normal conjunctiva, Sclerae are nonicteri  Integumentary : Warm, moist, intact.  Neurologic : Alert, Oriented.  Psychiatric : Cooperative, Appropriate mood & affect.   Assessment/ Plan:   1. Type 2 diabetes mellitus without complication, without long-term current use of insulin  Assessment & Plan:    Currently on no management he has an appetite and has gained 8 lb but states that his appetite returned " after he discontinued his chemo pill he got a 14 day break and will be resuming so I do not want to start a new diabetic medicine or resume his Rybelsus right now I want to put a monitor on him and see what his sugars look like if his fasting sugars are getting upwards in the 130s and 150s then we are going to have to put him back on something but maybe something that would increase his appetite like a little bit of glipizide or low-dose Actos  We  will see him back in the office in 6 weeks with labs    Orders:  -     Discontinue: flash glucose sensor (FREESTYLE DERICK 14 DAY SENSOR) Kit; 1 each by Misc.(Non-Drug; Combo Route) route every 14 (fourteen) days.  Dispense: 2 kit; Refill: 3  -     Discontinue: flash glucose sensor (FREESTYLE DERICK 14 DAY SENSOR) Kit; 1 each by Misc.(Non-Drug; Combo Route) route every 14 (fourteen) days.  Dispense: 2 kit; Refill: 3  -     flash glucose sensor (FREESTYLE DERICK 14 DAY SENSOR) Kit; 1 each by Misc.(Non-Drug; Combo Route) route every 14 (fourteen) days.  Dispense: 2 kit; Refill: 11    2. Diarrhea, unspecified type             Follow up in about 6 weeks (around 9/28/2023) for DIABETIC REVISIT, with labs prior to visit.

## 2023-09-06 ENCOUNTER — TELEPHONE (OUTPATIENT)
Dept: INTERNAL MEDICINE | Facility: CLINIC | Age: 73
End: 2023-09-06
Payer: MEDICARE

## 2023-09-25 ENCOUNTER — TELEPHONE (OUTPATIENT)
Dept: INTERNAL MEDICINE | Facility: CLINIC | Age: 73
End: 2023-09-25
Payer: MEDICARE

## 2023-09-25 DIAGNOSIS — E11.9 TYPE 2 DIABETES MELLITUS WITHOUT COMPLICATION, WITHOUT LONG-TERM CURRENT USE OF INSULIN: Primary | ICD-10-CM

## 2023-09-25 NOTE — TELEPHONE ENCOUNTER
----- Message from Russell Stoddard MA sent at 9/25/2023 10:01 AM CDT -----  Regarding: PV 10/9/23 @ 10:40 Dr. Moon  1. Are there any outstanding tasks in the patient's chart? Yes, fasting labs    2. Is there any documentation in the chart? No    3.Has patient been seen in an ER, Urgent care clinic, or been admitted since last visit?  If yes, When, where, and why    4. Has patient seen any other healthcare providers since last visit?  If yes, when, where, and why    5. Has patient had any bloodwork or XR done since last visit?    6. Is patient signed up for patient portal?

## 2023-10-09 ENCOUNTER — OFFICE VISIT (OUTPATIENT)
Dept: INTERNAL MEDICINE | Facility: CLINIC | Age: 73
End: 2023-10-09
Payer: COMMERCIAL

## 2023-10-09 ENCOUNTER — LAB VISIT (OUTPATIENT)
Dept: LAB | Facility: HOSPITAL | Age: 73
End: 2023-10-09
Attending: INTERNAL MEDICINE
Payer: MEDICARE

## 2023-10-09 VITALS
HEIGHT: 66 IN | BODY MASS INDEX: 24.91 KG/M2 | OXYGEN SATURATION: 98 % | DIASTOLIC BLOOD PRESSURE: 86 MMHG | SYSTOLIC BLOOD PRESSURE: 138 MMHG | HEART RATE: 64 BPM | WEIGHT: 155 LBS

## 2023-10-09 DIAGNOSIS — E11.9 TYPE 2 DIABETES MELLITUS WITHOUT COMPLICATION, WITHOUT LONG-TERM CURRENT USE OF INSULIN: ICD-10-CM

## 2023-10-09 LAB
ALBUMIN SERPL-MCNC: 4 G/DL (ref 3.4–4.8)
ALBUMIN/GLOB SERPL: 1.2 RATIO (ref 1.1–2)
ALP SERPL-CCNC: 39 UNIT/L (ref 40–150)
ALT SERPL-CCNC: 24 UNIT/L (ref 0–55)
AST SERPL-CCNC: 24 UNIT/L (ref 5–34)
BILIRUB SERPL-MCNC: 0.6 MG/DL
BUN SERPL-MCNC: 14.1 MG/DL (ref 8.4–25.7)
CALCIUM SERPL-MCNC: 9.4 MG/DL (ref 8.8–10)
CHLORIDE SERPL-SCNC: 108 MMOL/L (ref 98–107)
CO2 SERPL-SCNC: 26 MMOL/L (ref 23–31)
CREAT SERPL-MCNC: 1.06 MG/DL (ref 0.73–1.18)
EST. AVERAGE GLUCOSE BLD GHB EST-MCNC: 134.1 MG/DL
GFR SERPLBLD CREATININE-BSD FMLA CKD-EPI: >60 MLS/MIN/1.73/M2
GLOBULIN SER-MCNC: 3.3 GM/DL (ref 2.4–3.5)
GLUCOSE SERPL-MCNC: 134 MG/DL (ref 82–115)
HBA1C MFR BLD: 6.3 %
POTASSIUM SERPL-SCNC: 5.5 MMOL/L (ref 3.5–5.1)
PROT SERPL-MCNC: 7.3 GM/DL (ref 5.8–7.6)
SODIUM SERPL-SCNC: 139 MMOL/L (ref 136–145)

## 2023-10-09 PROCEDURE — 3066F NEPHROPATHY DOC TX: CPT | Mod: CPTII,,, | Performed by: INTERNAL MEDICINE

## 2023-10-09 PROCEDURE — 3079F DIAST BP 80-89 MM HG: CPT | Mod: CPTII,,, | Performed by: INTERNAL MEDICINE

## 2023-10-09 PROCEDURE — 1160F PR REVIEW ALL MEDS BY PRESCRIBER/CLIN PHARMACIST DOCUMENTED: ICD-10-PCS | Mod: CPTII,,, | Performed by: INTERNAL MEDICINE

## 2023-10-09 PROCEDURE — 3008F BODY MASS INDEX DOCD: CPT | Mod: CPTII,,, | Performed by: INTERNAL MEDICINE

## 2023-10-09 PROCEDURE — 36415 COLL VENOUS BLD VENIPUNCTURE: CPT

## 2023-10-09 PROCEDURE — 1159F MED LIST DOCD IN RCRD: CPT | Mod: CPTII,,, | Performed by: INTERNAL MEDICINE

## 2023-10-09 PROCEDURE — 4010F PR ACE/ARB THEARPY RXD/TAKEN: ICD-10-PCS | Mod: CPTII,,, | Performed by: INTERNAL MEDICINE

## 2023-10-09 PROCEDURE — 3008F PR BODY MASS INDEX (BMI) DOCUMENTED: ICD-10-PCS | Mod: CPTII,,, | Performed by: INTERNAL MEDICINE

## 2023-10-09 PROCEDURE — 3061F NEG MICROALBUMINURIA REV: CPT | Mod: CPTII,,, | Performed by: INTERNAL MEDICINE

## 2023-10-09 PROCEDURE — 99214 PR OFFICE/OUTPT VISIT, EST, LEVL IV, 30-39 MIN: ICD-10-PCS | Mod: ,,, | Performed by: INTERNAL MEDICINE

## 2023-10-09 PROCEDURE — 3066F PR DOCUMENTATION OF TREATMENT FOR NEPHROPATHY: ICD-10-PCS | Mod: CPTII,,, | Performed by: INTERNAL MEDICINE

## 2023-10-09 PROCEDURE — 3044F PR MOST RECENT HEMOGLOBIN A1C LEVEL <7.0%: ICD-10-PCS | Mod: CPTII,,, | Performed by: INTERNAL MEDICINE

## 2023-10-09 PROCEDURE — 3044F HG A1C LEVEL LT 7.0%: CPT | Mod: CPTII,,, | Performed by: INTERNAL MEDICINE

## 2023-10-09 PROCEDURE — 80053 COMPREHEN METABOLIC PANEL: CPT

## 2023-10-09 PROCEDURE — 3061F PR NEG MICROALBUMINURIA RESULT DOCUMENTED/REVIEW: ICD-10-PCS | Mod: CPTII,,, | Performed by: INTERNAL MEDICINE

## 2023-10-09 PROCEDURE — 99214 OFFICE O/P EST MOD 30 MIN: CPT | Mod: ,,, | Performed by: INTERNAL MEDICINE

## 2023-10-09 PROCEDURE — 3075F SYST BP GE 130 - 139MM HG: CPT | Mod: CPTII,,, | Performed by: INTERNAL MEDICINE

## 2023-10-09 PROCEDURE — 4010F ACE/ARB THERAPY RXD/TAKEN: CPT | Mod: CPTII,,, | Performed by: INTERNAL MEDICINE

## 2023-10-09 PROCEDURE — 83036 HEMOGLOBIN GLYCOSYLATED A1C: CPT

## 2023-10-09 PROCEDURE — 1160F RVW MEDS BY RX/DR IN RCRD: CPT | Mod: CPTII,,, | Performed by: INTERNAL MEDICINE

## 2023-10-09 PROCEDURE — 3079F PR MOST RECENT DIASTOLIC BLOOD PRESSURE 80-89 MM HG: ICD-10-PCS | Mod: CPTII,,, | Performed by: INTERNAL MEDICINE

## 2023-10-09 PROCEDURE — 1159F PR MEDICATION LIST DOCUMENTED IN MEDICAL RECORD: ICD-10-PCS | Mod: CPTII,,, | Performed by: INTERNAL MEDICINE

## 2023-10-09 PROCEDURE — 3075F PR MOST RECENT SYSTOLIC BLOOD PRESS GE 130-139MM HG: ICD-10-PCS | Mod: CPTII,,, | Performed by: INTERNAL MEDICINE

## 2023-10-09 RX ORDER — ORAL SEMAGLUTIDE 7 MG/1
7 TABLET ORAL DAILY
COMMUNITY
End: 2023-10-09

## 2023-10-09 RX ORDER — FAMOTIDINE 10 MG/1
10 TABLET ORAL 2 TIMES DAILY
COMMUNITY

## 2023-10-09 RX ORDER — GLIPIZIDE 2.5 MG/1
2.5 TABLET, EXTENDED RELEASE ORAL
Qty: 90 TABLET | Refills: 3 | Status: SHIPPED | OUTPATIENT
Start: 2023-10-09 | End: 2024-02-15

## 2023-10-09 NOTE — ASSESSMENT & PLAN NOTE
DC RYBELSUS  I have told the patient at the visit 6 weeks ago to quit taking the medication but he still been taking it access staff to call the pharmacy to please discontinue the medication in all refills.  Glipizide 2.5 mg XL daily  RTC 6 weeks just for review of his Rob.  No labs needed

## 2023-10-09 NOTE — PROGRESS NOTES
Subjective:      Patient ID: Nick Son Jr is a 73 y.o. male.    Chief Complaint: Follow-up (6 wk DM)      HPI:  Patient complains of early satiety, nausea and vomiting and diarrhea I had discontinued his G LP 1 at the time of his last visit 6 weeks ago but he discloses that he has still been taking it even though it is not on his medicine list.  I again told the patient to quit taking the medication we are going to put him on just a low dose glipizide XL 2.5 mg    Past Medical History:  Past Medical History:   Diagnosis Date    Diabetes mellitus, type 2     Personal history of colonic polyps      Past Surgical History:   Procedure Laterality Date    APPENDECTOMY      COLONOSCOPY  05/10/2017    FRACTURE SURGERY  Age 11    Broke right arm    KIDNEY TRANSPLANT  2017    Right kidney removed.   No transplant    SPINE SURGERY  has had 5 surgeries     Review of patient's allergies indicates:   Allergen Reactions    Meperidine Hives and Itching    Morphine Hives and Itching    Propoxyphene n-acetaminophen Hives and Itching    Iodinated contrast media Itching and Rash     12/7/22 - C/o delayed reaction following day with rash & itching to L knee to feet.     Current Outpatient Medications on File Prior to Visit   Medication Sig Dispense Refill    cholecalciferol, vitamin D3, (VITAMIN D3) 10 mcg (400 unit) Tab Take 400 Units by mouth once daily.      cyanocobalamin (VITAMIN B-12) 100 MCG tablet Take 100 mcg by mouth once daily.      digoxin (LANOXIN) 125 mcg tablet Take 125 mcg by mouth.      famotidine (PEPCID) 10 MG tablet Take 10 mg by mouth 2 (two) times daily.      flash glucose sensor (FREESTYLE DERICK 14 DAY SENSOR) Kit 1 each by Misc.(Non-Drug; Combo Route) route every 14 (fourteen) days. 2 kit 11    lenvatinib (LENVIMA) 10 mg/day (10 mg x 1) Cap Take 1 capsule by mouth once daily.      levothyroxine (SYNTHROID) 100 MCG tablet Take 1 tablet by mouth once daily.      losartan (COZAAR) 25 MG tablet Take 25 mg by  mouth once daily.      multivitamin with folic acid 400 mcg Tab Take 1 tablet by mouth once daily.      niacinamide 500 mg Tab Take 500 mg by mouth every evening.      pravastatin (PRAVACHOL) 80 MG tablet Take 40 mg by mouth once daily.      vitamin E 100 UNIT capsule Take 100 Units by mouth once daily.      [DISCONTINUED] semaglutide (RYBELSUS) 7 mg tablet Take 7 mg by mouth once daily.       No current facility-administered medications on file prior to visit.     Social History     Socioeconomic History    Marital status:    Tobacco Use    Smoking status: Never    Smokeless tobacco: Never   Substance and Sexual Activity    Alcohol use: Yes     Comment: Occassioanlly    Drug use: Never    Sexual activity: Yes     Partners: Female     Birth control/protection: None     Social Determinants of Health     Financial Resource Strain: Low Risk  (8/1/2023)    Overall Financial Resource Strain (CARDIA)     Difficulty of Paying Living Expenses: Not hard at all   Food Insecurity: No Food Insecurity (8/1/2023)    Hunger Vital Sign     Worried About Running Out of Food in the Last Year: Never true     Ran Out of Food in the Last Year: Never true   Transportation Needs: No Transportation Needs (8/1/2023)    PRAPARE - Transportation     Lack of Transportation (Medical): No     Lack of Transportation (Non-Medical): No   Physical Activity: Sufficiently Active (8/1/2023)    Exercise Vital Sign     Days of Exercise per Week: 7 days     Minutes of Exercise per Session: 60 min   Stress: No Stress Concern Present (8/1/2023)    [employer] [place] [employer] - Occupational Stress Questionnaire     Feeling of Stress : Not at all   Social Connections: Socially Integrated (8/1/2023)    Social Connection and Isolation Panel [NHANES]     Frequency of Communication with Friends and Family: More than three times a week     Frequency of Social Gatherings with Friends and Family: More than three times a week     Attends Denominational  "Services: More than 4 times per year     Active Member of Clubs or Organizations: Yes     Attends Club or Organization Meetings: More than 4 times per year     Marital Status:    Housing Stability: Low Risk  (8/1/2023)    Housing Stability Vital Sign     Unable to Pay for Housing in the Last Year: No     Number of Places Lived in the Last Year: 1     Unstable Housing in the Last Year: No     Family History   Problem Relation Age of Onset    Cancer Father         cancer of esphogus    Cancer Paternal Uncle         Kidney Cancer       Review of Systems  A comprehensive review of systems was performed and was negative with exception of what is documented above.     Objective:   /86   Pulse 64   Ht 5' 6" (1.676 m)   Wt 70.3 kg (155 lb)   SpO2 98%   BMI 25.02 kg/m²   Physical Exam  General : Alert and oriented, No acute distress, afebrile.  Eye : PERRLA. EOMI. Normal conjunctiva, Sclerae are nonicteric.  Respiratory : Respirations are non-labored and clear to auscultation bilaterally. Symmetrical air entry bilaterally, no crackles, no wheezes, no rhonchi. No cyanosis, no clubbing.  Cardiovascular : Normal rate, Regular rhythm. No murmurs, rubs, or gallops. Pulses are 2+ throughout. No JVD. No Edema.  Gastrointestinal : Soft, nontender, non-distended, bowel sounds are present in all quadrants, no organomegaly, no guarding, no rebound.  Musculoskeletal : Normal range of motion throughout. No muscle tenderness.  Integumentary : Warm, moist, intact.  Neurologic : Alert, Oriented  Psychiatric : Cooperative, Appropriate mood & affect.   Assessment/ Plan:   1. Type 2 diabetes mellitus without complication, without long-term current use of insulin  Assessment & Plan:  DC RYBELSUS  I have told the patient at the visit 6 weeks ago to quit taking the medication but he still been taking it access staff to call the pharmacy to please discontinue the medication in all refills.  Glipizide 2.5 mg XL daily  RTC 6 weeks " just for review of his Rob.  No labs needed      Other orders  -     glipiZIDE (GLUCOTROL) 2.5 MG TR24; Take 1 tablet (2.5 mg total) by mouth daily with breakfast.  Dispense: 90 tablet; Refill: 3             Follow up in about 6 weeks (around 11/20/2023) for no labs.

## 2023-10-10 ENCOUNTER — TELEPHONE (OUTPATIENT)
Dept: INTERNAL MEDICINE | Facility: CLINIC | Age: 73
End: 2023-10-10
Payer: MEDICARE

## 2023-10-11 ENCOUNTER — TELEPHONE (OUTPATIENT)
Dept: INTERNAL MEDICINE | Facility: CLINIC | Age: 73
End: 2023-10-11
Payer: MEDICARE

## 2023-11-14 ENCOUNTER — OFFICE VISIT (OUTPATIENT)
Dept: INTERNAL MEDICINE | Facility: CLINIC | Age: 73
End: 2023-11-14
Payer: COMMERCIAL

## 2023-11-14 VITALS
OXYGEN SATURATION: 98 % | WEIGHT: 157 LBS | HEIGHT: 66 IN | BODY MASS INDEX: 25.23 KG/M2 | SYSTOLIC BLOOD PRESSURE: 138 MMHG | HEART RATE: 64 BPM | DIASTOLIC BLOOD PRESSURE: 86 MMHG | TEMPERATURE: 97 F | RESPIRATION RATE: 16 BRPM

## 2023-11-14 DIAGNOSIS — E11.9 TYPE 2 DIABETES MELLITUS WITHOUT COMPLICATION, WITHOUT LONG-TERM CURRENT USE OF INSULIN: Primary | ICD-10-CM

## 2023-11-14 LAB
LEFT EYE DM RETINOPATHY: NEGATIVE
RIGHT EYE DM RETINOPATHY: NEGATIVE

## 2023-11-14 PROCEDURE — 3288F PR FALLS RISK ASSESSMENT DOCUMENTED: ICD-10-PCS | Mod: CPTII,,, | Performed by: INTERNAL MEDICINE

## 2023-11-14 PROCEDURE — 3288F FALL RISK ASSESSMENT DOCD: CPT | Mod: CPTII,,, | Performed by: INTERNAL MEDICINE

## 2023-11-14 PROCEDURE — 3008F PR BODY MASS INDEX (BMI) DOCUMENTED: ICD-10-PCS | Mod: CPTII,,, | Performed by: INTERNAL MEDICINE

## 2023-11-14 PROCEDURE — 3061F PR NEG MICROALBUMINURIA RESULT DOCUMENTED/REVIEW: ICD-10-PCS | Mod: CPTII,,, | Performed by: INTERNAL MEDICINE

## 2023-11-14 PROCEDURE — 3008F BODY MASS INDEX DOCD: CPT | Mod: CPTII,,, | Performed by: INTERNAL MEDICINE

## 2023-11-14 PROCEDURE — 3061F NEG MICROALBUMINURIA REV: CPT | Mod: CPTII,,, | Performed by: INTERNAL MEDICINE

## 2023-11-14 PROCEDURE — 1160F RVW MEDS BY RX/DR IN RCRD: CPT | Mod: CPTII,,, | Performed by: INTERNAL MEDICINE

## 2023-11-14 PROCEDURE — 4010F PR ACE/ARB THEARPY RXD/TAKEN: ICD-10-PCS | Mod: CPTII,,, | Performed by: INTERNAL MEDICINE

## 2023-11-14 PROCEDURE — 1159F MED LIST DOCD IN RCRD: CPT | Mod: CPTII,,, | Performed by: INTERNAL MEDICINE

## 2023-11-14 PROCEDURE — 99213 OFFICE O/P EST LOW 20 MIN: CPT | Mod: ,,, | Performed by: INTERNAL MEDICINE

## 2023-11-14 PROCEDURE — 1101F PT FALLS ASSESS-DOCD LE1/YR: CPT | Mod: CPTII,,, | Performed by: INTERNAL MEDICINE

## 2023-11-14 PROCEDURE — 1159F PR MEDICATION LIST DOCUMENTED IN MEDICAL RECORD: ICD-10-PCS | Mod: CPTII,,, | Performed by: INTERNAL MEDICINE

## 2023-11-14 PROCEDURE — 4010F ACE/ARB THERAPY RXD/TAKEN: CPT | Mod: CPTII,,, | Performed by: INTERNAL MEDICINE

## 2023-11-14 PROCEDURE — 3075F SYST BP GE 130 - 139MM HG: CPT | Mod: CPTII,,, | Performed by: INTERNAL MEDICINE

## 2023-11-14 PROCEDURE — 99213 PR OFFICE/OUTPT VISIT, EST, LEVL III, 20-29 MIN: ICD-10-PCS | Mod: ,,, | Performed by: INTERNAL MEDICINE

## 2023-11-14 PROCEDURE — 3044F HG A1C LEVEL LT 7.0%: CPT | Mod: CPTII,,, | Performed by: INTERNAL MEDICINE

## 2023-11-14 PROCEDURE — 3075F PR MOST RECENT SYSTOLIC BLOOD PRESS GE 130-139MM HG: ICD-10-PCS | Mod: CPTII,,, | Performed by: INTERNAL MEDICINE

## 2023-11-14 PROCEDURE — 3079F PR MOST RECENT DIASTOLIC BLOOD PRESSURE 80-89 MM HG: ICD-10-PCS | Mod: CPTII,,, | Performed by: INTERNAL MEDICINE

## 2023-11-14 PROCEDURE — 3044F PR MOST RECENT HEMOGLOBIN A1C LEVEL <7.0%: ICD-10-PCS | Mod: CPTII,,, | Performed by: INTERNAL MEDICINE

## 2023-11-14 PROCEDURE — 3066F NEPHROPATHY DOC TX: CPT | Mod: CPTII,,, | Performed by: INTERNAL MEDICINE

## 2023-11-14 PROCEDURE — 3079F DIAST BP 80-89 MM HG: CPT | Mod: CPTII,,, | Performed by: INTERNAL MEDICINE

## 2023-11-14 PROCEDURE — 3066F PR DOCUMENTATION OF TREATMENT FOR NEPHROPATHY: ICD-10-PCS | Mod: CPTII,,, | Performed by: INTERNAL MEDICINE

## 2023-11-14 PROCEDURE — 1160F PR REVIEW ALL MEDS BY PRESCRIBER/CLIN PHARMACIST DOCUMENTED: ICD-10-PCS | Mod: CPTII,,, | Performed by: INTERNAL MEDICINE

## 2023-11-14 PROCEDURE — 1101F PR PT FALLS ASSESS DOC 0-1 FALLS W/OUT INJ PAST YR: ICD-10-PCS | Mod: CPTII,,, | Performed by: INTERNAL MEDICINE

## 2023-11-14 NOTE — ASSESSMENT & PLAN NOTE
Patient advised to monitor sugars with continuous glucose monitor until alert me if his fasting sugars are higher than 150.  Otherwise will see him back in 3 months for recheck on his blood sugar  But overall doing much better off of the G LP 1 and he is also taking a break from his chemo at current  Weight up 7 lbs

## 2023-11-14 NOTE — PROGRESS NOTES
Subjective:      Patient ID: Nick Son Jr is a 73 y.o. male.    Chief Complaint: Diabetes (6 week f/u)      HPI:  73 year old male here for diabetic revisit  Weight is up  He is off Rybelsus  Down 16lbs since his last visit  He has diarrhea   Fastings are not crossing 125-135  Does not have her wes on      Past Medical History:  Past Medical History:   Diagnosis Date    Diabetes mellitus, type 2     Personal history of colonic polyps      Past Surgical History:   Procedure Laterality Date    APPENDECTOMY      COLONOSCOPY  05/10/2017    FRACTURE SURGERY  Age 11    Broke right arm    KIDNEY TRANSPLANT  2017    Right kidney removed.   No transplant    SPINE SURGERY  has had 5 surgeries     Review of patient's allergies indicates:   Allergen Reactions    Meperidine Hives and Itching    Morphine Hives and Itching    Propoxyphene n-acetaminophen Hives and Itching    Iodinated contrast media Itching and Rash     12/7/22 - C/o delayed reaction following day with rash & itching to L knee to feet.     Current Outpatient Medications on File Prior to Visit   Medication Sig Dispense Refill    cholecalciferol, vitamin D3, (VITAMIN D3) 10 mcg (400 unit) Tab Take 400 Units by mouth once daily.      cyanocobalamin (VITAMIN B-12) 100 MCG tablet Take 100 mcg by mouth once daily.      digoxin (LANOXIN) 125 mcg tablet Take 125 mcg by mouth.      famotidine (PEPCID) 10 MG tablet Take 10 mg by mouth 2 (two) times daily.      flash glucose sensor (FREESTYLE WES 14 DAY SENSOR) Kit 1 each by Misc.(Non-Drug; Combo Route) route every 14 (fourteen) days. 2 kit 11    glipiZIDE (GLUCOTROL) 2.5 MG TR24 Take 1 tablet (2.5 mg total) by mouth daily with breakfast. 90 tablet 3    lenvatinib (LENVIMA) 10 mg/day (10 mg x 1) Cap Take 1 capsule by mouth once daily.      levothyroxine (SYNTHROID) 100 MCG tablet Take 1 tablet by mouth once daily.      losartan (COZAAR) 25 MG tablet Take 25 mg by mouth once daily.      multivitamin with folic acid  400 mcg Tab Take 1 tablet by mouth once daily.      niacinamide 500 mg Tab Take 500 mg by mouth every evening.      pravastatin (PRAVACHOL) 80 MG tablet Take 40 mg by mouth once daily.      vitamin E 100 UNIT capsule Take 100 Units by mouth once daily.       No current facility-administered medications on file prior to visit.     Social History     Socioeconomic History    Marital status:    Tobacco Use    Smoking status: Never    Smokeless tobacco: Never   Substance and Sexual Activity    Alcohol use: Yes     Comment: Occassioanlly    Drug use: Never    Sexual activity: Yes     Partners: Female     Birth control/protection: None     Social Determinants of Health     Financial Resource Strain: Low Risk  (8/1/2023)    Overall Financial Resource Strain (CARDIA)     Difficulty of Paying Living Expenses: Not hard at all   Food Insecurity: No Food Insecurity (8/1/2023)    Hunger Vital Sign     Worried About Running Out of Food in the Last Year: Never true     Ran Out of Food in the Last Year: Never true   Transportation Needs: No Transportation Needs (8/1/2023)    PRAPARE - Transportation     Lack of Transportation (Medical): No     Lack of Transportation (Non-Medical): No   Physical Activity: Sufficiently Active (8/1/2023)    Exercise Vital Sign     Days of Exercise per Week: 7 days     Minutes of Exercise per Session: 60 min   Stress: No Stress Concern Present (8/1/2023)    [employer] [place] [employer] - Occupational Stress Questionnaire     Feeling of Stress : Not at all   Social Connections: Socially Integrated (8/1/2023)    Social Connection and Isolation Panel [NHANES]     Frequency of Communication with Friends and Family: More than three times a week     Frequency of Social Gatherings with Friends and Family: More than three times a week     Attends Alevism Services: More than 4 times per year     Active Member of Clubs or Organizations: Yes     Attends Club or Organization Meetings: More  "than 4 times per year     Marital Status:    Housing Stability: Low Risk  (8/1/2023)    Housing Stability Vital Sign     Unable to Pay for Housing in the Last Year: No     Number of Places Lived in the Last Year: 1     Unstable Housing in the Last Year: No     Family History   Problem Relation Age of Onset    Cancer Father         cancer of esphogus    Cancer Paternal Uncle         Kidney Cancer       Review of Systems  A comprehensive review of systems was performed and was negative with exception of what is documented above.     Objective:   /86 (BP Location: Left arm, Patient Position: Sitting, BP Method: Medium (Manual))   Pulse 64   Temp 97.4 °F (36.3 °C) (Temporal)   Resp 16   Ht 5' 6" (1.676 m)   Wt 71.2 kg (157 lb)   SpO2 98%   BMI 25.34 kg/m²   Physical Exam  General : Alert and oriented, No acute distress, afebrile.  Eye : PERRLA. EOMI. Normal conjunctiva, Sclerae are nonicteric.   Respiratory : Respirations are non-labored and clear to auscultation bilaterally. Symmetrical air entry bilaterally, no crackles, no wheezes, no rhonchi. No cyanosis, no clubbing.  Cardiovascular : Normal rate, Regular rhythm. No murmurs, rubs, or gallops. Pulses are 2+ throughout. No JVD. No Edema.  Gastrointestinal : Soft, nontender, non-distended, bowel sounds are present in all quadrants, no organomegaly, no guarding, no rebound.  Musculoskeletal : Normal range of motion throughout. No muscle tenderness.  Integumentary : Warm, moist, intact.  Neurologic : Alert, Oriented  Psychiatric : Cooperative, Appropriate mood & affect.   Assessment/ Plan:   1. Type 2 diabetes mellitus without complication, without long-term current use of insulin  Assessment & Plan:  Patient advised to monitor sugars with continuous glucose monitor until alert me if his fasting sugars are higher than 150.  Otherwise will see him back in 3 months for recheck on his blood sugar  But overall doing much better off of the G LP 1 and he " is also taking a break from his chemo at current  Weight up 7 lbs           Will contact his gastroenterologist office it seems that he is due for a colonoscopy?      Follow up in about 3 months (around 2/14/2024) for with labs prior to visit, DIABETIC REVISIT.

## 2023-11-27 ENCOUNTER — DOCUMENTATION ONLY (OUTPATIENT)
Dept: ADMINISTRATIVE | Facility: HOSPITAL | Age: 73
End: 2023-11-27
Payer: MEDICARE

## 2024-02-01 ENCOUNTER — TELEPHONE (OUTPATIENT)
Dept: INTERNAL MEDICINE | Facility: CLINIC | Age: 74
End: 2024-02-01
Payer: MEDICARE

## 2024-02-01 DIAGNOSIS — E03.9 HYPOTHYROIDISM, UNSPECIFIED TYPE: ICD-10-CM

## 2024-02-01 DIAGNOSIS — E55.9 VITAMIN D DEFICIENCY: ICD-10-CM

## 2024-02-01 DIAGNOSIS — E29.1 HYPOGONADISM IN MALE: ICD-10-CM

## 2024-02-01 DIAGNOSIS — E11.9 TYPE 2 DIABETES MELLITUS WITHOUT COMPLICATION, WITHOUT LONG-TERM CURRENT USE OF INSULIN: Primary | ICD-10-CM

## 2024-02-01 DIAGNOSIS — N40.0 BENIGN PROSTATIC HYPERPLASIA, UNSPECIFIED WHETHER LOWER URINARY TRACT SYMPTOMS PRESENT: ICD-10-CM

## 2024-02-01 NOTE — TELEPHONE ENCOUNTER
----- Message from Russell Stoddard MA sent at 2/1/2024  8:24 AM CST -----  Regarding: PV 2/15/24 @ 11:00 Dr. Moon  1. Are there any outstanding tasks in the patient's chart? Yes, fasting labs    2. Is there any documentation in the chart? No    3.Has patient been seen in an ER, Urgent care clinic, or been admitted since last visit?  If yes, When, where, and why    4. Has patient seen any other healthcare providers since last visit?  If yes, when, where, and why    5. Has patient had any bloodwork or XR done since last visit?    6. Is patient signed up for patient portal?

## 2024-02-12 ENCOUNTER — LAB VISIT (OUTPATIENT)
Dept: LAB | Facility: HOSPITAL | Age: 74
End: 2024-02-12
Attending: INTERNAL MEDICINE
Payer: MEDICARE

## 2024-02-12 DIAGNOSIS — E03.9 HYPOTHYROIDISM, UNSPECIFIED TYPE: ICD-10-CM

## 2024-02-12 DIAGNOSIS — E55.9 VITAMIN D DEFICIENCY: ICD-10-CM

## 2024-02-12 DIAGNOSIS — E11.9 TYPE 2 DIABETES MELLITUS WITHOUT COMPLICATION, WITHOUT LONG-TERM CURRENT USE OF INSULIN: ICD-10-CM

## 2024-02-12 DIAGNOSIS — E29.1 HYPOGONADISM IN MALE: ICD-10-CM

## 2024-02-12 DIAGNOSIS — N40.0 BENIGN PROSTATIC HYPERPLASIA, UNSPECIFIED WHETHER LOWER URINARY TRACT SYMPTOMS PRESENT: ICD-10-CM

## 2024-02-12 LAB
ALBUMIN SERPL-MCNC: 3.7 G/DL (ref 3.4–4.8)
ALBUMIN/GLOB SERPL: 1.2 RATIO (ref 1.1–2)
ALP SERPL-CCNC: 49 UNIT/L (ref 40–150)
ALT SERPL-CCNC: 21 UNIT/L (ref 0–55)
APPEARANCE UR: CLEAR
AST SERPL-CCNC: 23 UNIT/L (ref 5–34)
BACTERIA #/AREA URNS AUTO: NORMAL /HPF
BASOPHILS # BLD AUTO: 0.02 X10(3)/MCL
BASOPHILS NFR BLD AUTO: 0.4 %
BILIRUB SERPL-MCNC: 0.3 MG/DL
BILIRUB UR QL STRIP.AUTO: NEGATIVE
BUN SERPL-MCNC: 13.7 MG/DL (ref 8.4–25.7)
CALCIUM SERPL-MCNC: 8.9 MG/DL (ref 8.8–10)
CHLORIDE SERPL-SCNC: 105 MMOL/L (ref 98–107)
CHOLEST SERPL-MCNC: 229 MG/DL
CHOLEST/HDLC SERPL: 7 {RATIO} (ref 0–5)
CO2 SERPL-SCNC: 31 MMOL/L (ref 23–31)
COLOR UR AUTO: NORMAL
CREAT SERPL-MCNC: 1.02 MG/DL (ref 0.73–1.18)
CREAT UR-MCNC: 91.5 MG/DL (ref 63–166)
DEPRECATED CALCIDIOL+CALCIFEROL SERPL-MC: 30.1 NG/ML (ref 30–80)
EOSINOPHIL # BLD AUTO: 0.14 X10(3)/MCL (ref 0–0.9)
EOSINOPHIL NFR BLD AUTO: 2.7 %
ERYTHROCYTE [DISTWIDTH] IN BLOOD BY AUTOMATED COUNT: 13.3 % (ref 11.5–17)
EST. AVERAGE GLUCOSE BLD GHB EST-MCNC: 139.9 MG/DL
FREE/TOTAL PSA (OLG): 0.5
GFR SERPLBLD CREATININE-BSD FMLA CKD-EPI: >60 MLS/MIN/1.73/M2
GLOBULIN SER-MCNC: 3 GM/DL (ref 2.4–3.5)
GLUCOSE SERPL-MCNC: 159 MG/DL (ref 82–115)
GLUCOSE UR QL STRIP.AUTO: NORMAL
HBA1C MFR BLD: 6.5 %
HCT VFR BLD AUTO: 43.5 % (ref 42–52)
HDLC SERPL-MCNC: 33 MG/DL (ref 35–60)
HGB BLD-MCNC: 14.7 G/DL (ref 14–18)
IMM GRANULOCYTES # BLD AUTO: 0.01 X10(3)/MCL (ref 0–0.04)
IMM GRANULOCYTES NFR BLD AUTO: 0.2 %
KETONES UR QL STRIP.AUTO: NEGATIVE
LEUKOCYTE ESTERASE UR QL STRIP.AUTO: NEGATIVE
LYMPHOCYTES # BLD AUTO: 1.83 X10(3)/MCL (ref 0.6–4.6)
LYMPHOCYTES NFR BLD AUTO: 35.5 %
MCH RBC QN AUTO: 32 PG (ref 27–31)
MCHC RBC AUTO-ENTMCNC: 33.8 G/DL (ref 33–36)
MCV RBC AUTO: 94.6 FL (ref 80–94)
MICROALBUMIN UR-MCNC: 8.5 UG/ML
MICROALBUMIN/CREAT RATIO PNL UR: 9.3 MG/GM CR (ref 0–30)
MONOCYTES # BLD AUTO: 0.39 X10(3)/MCL (ref 0.1–1.3)
MONOCYTES NFR BLD AUTO: 7.6 %
NEUTROPHILS # BLD AUTO: 2.77 X10(3)/MCL (ref 2.1–9.2)
NEUTROPHILS NFR BLD AUTO: 53.6 %
NITRITE UR QL STRIP.AUTO: NEGATIVE
NRBC BLD AUTO-RTO: 0 %
PH UR STRIP.AUTO: 5.5 [PH]
PLATELET # BLD AUTO: 142 X10(3)/MCL (ref 130–400)
PLATELETS.RETICULATED NFR BLD AUTO: 2 % (ref 0.9–11.2)
PMV BLD AUTO: 9.8 FL (ref 7.4–10.4)
POTASSIUM SERPL-SCNC: 5.3 MMOL/L (ref 3.5–5.1)
PROT SERPL-MCNC: 6.7 GM/DL (ref 5.8–7.6)
PROT UR QL STRIP.AUTO: NEGATIVE
PSA FREE MFR SERPL: 50 %
PSA FREE SERPL-MCNC: 1.51 NG/ML
PSA SERPL-MCNC: 3.04 NG/ML
RBC # BLD AUTO: 4.6 X10(6)/MCL (ref 4.7–6.1)
RBC #/AREA URNS AUTO: NORMAL /HPF
RBC UR QL AUTO: NEGATIVE
SODIUM SERPL-SCNC: 141 MMOL/L (ref 136–145)
SP GR UR STRIP.AUTO: 1.01 (ref 1–1.03)
SQUAMOUS #/AREA URNS LPF: NORMAL /HPF
TRIGL SERPL-MCNC: 461 MG/DL (ref 34–140)
TSH SERPL-ACNC: 3.16 UIU/ML (ref 0.35–4.94)
UROBILINOGEN UR STRIP-ACNC: NORMAL
WBC # SPEC AUTO: 5.16 X10(3)/MCL (ref 4.5–11.5)
WBC #/AREA URNS AUTO: NORMAL /HPF

## 2024-02-12 PROCEDURE — 82043 UR ALBUMIN QUANTITATIVE: CPT

## 2024-02-12 PROCEDURE — 85025 COMPLETE CBC W/AUTO DIFF WBC: CPT

## 2024-02-12 PROCEDURE — 36415 COLL VENOUS BLD VENIPUNCTURE: CPT

## 2024-02-12 PROCEDURE — 80061 LIPID PANEL: CPT

## 2024-02-12 PROCEDURE — 81001 URINALYSIS AUTO W/SCOPE: CPT

## 2024-02-12 PROCEDURE — 83036 HEMOGLOBIN GLYCOSYLATED A1C: CPT

## 2024-02-12 PROCEDURE — 82306 VITAMIN D 25 HYDROXY: CPT

## 2024-02-12 PROCEDURE — 80053 COMPREHEN METABOLIC PANEL: CPT

## 2024-02-12 PROCEDURE — 84443 ASSAY THYROID STIM HORMONE: CPT

## 2024-02-12 PROCEDURE — 84154 ASSAY OF PSA FREE: CPT

## 2024-02-15 ENCOUNTER — OFFICE VISIT (OUTPATIENT)
Dept: INTERNAL MEDICINE | Facility: CLINIC | Age: 74
End: 2024-02-15
Payer: MEDICARE

## 2024-02-15 VITALS
WEIGHT: 159 LBS | HEIGHT: 66 IN | OXYGEN SATURATION: 99 % | TEMPERATURE: 97 F | RESPIRATION RATE: 16 BRPM | DIASTOLIC BLOOD PRESSURE: 74 MMHG | BODY MASS INDEX: 25.55 KG/M2 | SYSTOLIC BLOOD PRESSURE: 130 MMHG | HEART RATE: 72 BPM

## 2024-02-15 DIAGNOSIS — C78.02 MALIGNANT NEOPLASM METASTATIC TO BOTH LUNGS: ICD-10-CM

## 2024-02-15 DIAGNOSIS — D69.6 THROMBOCYTOPENIA, UNSPECIFIED: ICD-10-CM

## 2024-02-15 DIAGNOSIS — C78.01 MALIGNANT NEOPLASM METASTATIC TO BOTH LUNGS: ICD-10-CM

## 2024-02-15 DIAGNOSIS — E11.9 TYPE 2 DIABETES MELLITUS WITHOUT COMPLICATION, WITHOUT LONG-TERM CURRENT USE OF INSULIN: ICD-10-CM

## 2024-02-15 DIAGNOSIS — Z00.00 MEDICARE ANNUAL WELLNESS VISIT, SUBSEQUENT: Primary | ICD-10-CM

## 2024-02-15 DIAGNOSIS — C64.2 CLEAR CELL CARCINOMA OF LEFT KIDNEY: ICD-10-CM

## 2024-02-15 PROBLEM — E46 MALNUTRITION RELATED TO CHRONIC DISEASE: Status: RESOLVED | Noted: 2023-04-18 | Resolved: 2024-02-15

## 2024-02-15 PROBLEM — R19.7 DIARRHEA: Status: RESOLVED | Noted: 2023-08-01 | Resolved: 2024-02-15

## 2024-02-15 PROBLEM — K14.6 TONGUE PAIN: Status: RESOLVED | Noted: 2023-04-18 | Resolved: 2024-02-15

## 2024-02-15 PROBLEM — R53.83 FATIGUE: Status: RESOLVED | Noted: 2022-05-23 | Resolved: 2024-02-15

## 2024-02-15 PROCEDURE — G0439 PPPS, SUBSEQ VISIT: HCPCS | Mod: ,,, | Performed by: INTERNAL MEDICINE

## 2024-02-15 RX ORDER — LORATADINE 10 MG/1
10 TABLET ORAL DAILY
COMMUNITY
End: 2024-06-12

## 2024-02-15 RX ORDER — FLUTICASONE PROPIONATE 50 MCG
1 SPRAY, SUSPENSION (ML) NASAL DAILY
COMMUNITY
Start: 2024-01-12 | End: 2024-05-23

## 2024-02-15 RX ORDER — LACTULOSE 10 G/15ML
30 SOLUTION ORAL; RECTAL 2 TIMES DAILY
COMMUNITY
End: 2024-05-23

## 2024-02-15 RX ORDER — FLUOROURACIL 50 MG/G
1 CREAM TOPICAL 2 TIMES DAILY
COMMUNITY
Start: 2023-12-11 | End: 2024-05-23

## 2024-02-15 RX ORDER — MONTELUKAST SODIUM 4 MG/1
1 TABLET, CHEWABLE ORAL 2 TIMES DAILY
COMMUNITY
End: 2024-05-23

## 2024-02-15 RX ORDER — DIPHENOXYLATE HYDROCHLORIDE AND ATROPINE SULFATE 2.5; .025 MG/1; MG/1
1-2 TABLET ORAL 4 TIMES DAILY PRN
COMMUNITY
Start: 2023-12-12 | End: 2024-05-23

## 2024-02-15 RX ORDER — ONDANSETRON HYDROCHLORIDE 8 MG/1
8 TABLET, FILM COATED ORAL EVERY 8 HOURS PRN
COMMUNITY

## 2024-02-15 RX ORDER — MAGNESIUM GLUCONATE 27.5 (500)
500 TABLET ORAL NIGHTLY
COMMUNITY
End: 2024-05-23

## 2024-02-15 RX ORDER — IPRATROPIUM BROMIDE 21 UG/1
1 SPRAY, METERED NASAL 2 TIMES DAILY
COMMUNITY
Start: 2024-02-07 | End: 2024-05-23

## 2024-02-15 RX ORDER — LENVATINIB 4 MG/1
8 CAPSULE ORAL DAILY
COMMUNITY
Start: 2024-01-23

## 2024-02-15 NOTE — PROGRESS NOTES
Internal Medicine      Patient ID: [id number]     Chief Complaint: Medicare Annual Wellness     HPI:     Nick Son Jr is a 73 y.o. male here today for a Medicare Annual Wellness visit and comprehensive Health Risk Assessment.   [place] [facility] saw him on October [place] for Op[facility]   ONCOLOGY HISTORY:  Oncology History   Kidney carcinoma, left (HCC)   2/28/2023 - Chemotherapy   The patient began OP Pembrolizumab every 3 weeks  Currently on Lenvatinib once a day--he quit taking on Friday    Dr Gonzalez this afternoon  Goes back to [facility] in March for re-staging  A1c at 6.5 fasting sugar at 1:50 a.m. no evidence of proteinuria, negative microalbumin  No acute complaints- GI symptoms resolved; quit his chemo pill on Friday    A separate E/M code has been provided to evaluate additional complaints that the patient would like addressed during the dedicated Medicare Wellness Exam.    Health Maintenance         Date Due Completion Date    Pneumococcal Vaccines (Age 65+) (1 of 2 - PCV) Never done ---    Foot Exam Never done ---    TETANUS VACCINE Never done ---    Shingles Vaccine (1 of 2) Never done ---    RSV Vaccine (Age 60+ and Pregnant patients) (1 - 1-dose 60+ series) Never done ---    Colorectal Cancer Screening 05/10/2022 5/10/2017    Influenza Vaccine (1) Never done ---    COVID-19 Vaccine (4 - 2023-24 season) 09/01/2023 4/13/2021    Hemoglobin A1c 08/12/2024 2/12/2024    Eye Exam 11/14/2024 11/14/2023    Diabetes Urine Screening 02/12/2025 2/12/2024    Lipid Panel 02/12/2025 2/12/2024             Past Medical History:   Diagnosis Date    Diabetes mellitus, type 2     Personal history of colonic polyps         Past Surgical History:   Procedure Laterality Date    APPENDECTOMY      COLONOSCOPY  05/10/2017    FRACTURE SURGERY  Age 11    Broke right arm    KIDNEY TRANSPLANT  2017    Right kidney removed.   No transplant    SPINE SURGERY  has had 5 surgeries        Social History     Socioeconomic  History    Marital status:    Tobacco Use    Smoking status: Never    Smokeless tobacco: Never   Substance and Sexual Activity    Alcohol use: Yes     Comment: Occassioanlly    Drug use: Never    Sexual activity: Yes     Partners: Female     Birth control/protection: None     Social Determinants of Health     Financial Resource Strain: Low Risk  (2/13/2024)    Overall Financial Resource Strain (CARDIA)     Difficulty of Paying Living Expenses: Not hard at all   Food Insecurity: No Food Insecurity (2/13/2024)    Hunger Vital Sign     Worried About Running Out of Food in the Last Year: Never true     Ran Out of Food in the Last Year: Never true   Transportation Needs: No Transportation Needs (2/13/2024)    PRAPARE - Transportation     Lack of Transportation (Medical): No     Lack of Transportation (Non-Medical): No   Physical Activity: Inactive (2/13/2024)    Exercise Vital Sign     Days of Exercise per Week: 0 days     Minutes of Exercise per Session: 0 min   Stress: No Stress Concern Present (2/13/2024)    [place] [employer] - Occupational Stress Questionnaire     Feeling of Stress : Only a little   Social Connections: Socially Integrated (2/13/2024)    Social Connection and Isolation Panel [NHANES]     Frequency of Communication with Friends and Family: More than three times a week     Frequency of Social Gatherings with Friends and Family: Once a week     Attends [employer] Services: More than 4 times per year     Active Member of Clubs or Organizations: Yes     Attends Club or Organization Meetings: More than 4 times per year     Marital Status:    Housing Stability: Low Risk  (2/13/2024)    Housing Stability Vital Sign     Unable to Pay for Housing in the Last Year: No     Number of Places Lived in the Last Year: 1     Unstable Housing in the Last Year: No        Family History   Problem Relation Age of Onset    Cancer Father         cancer of esphogus    Cancer Paternal Uncle          Kidney Cancer        Current Outpatient Medications   Medication Instructions    (Magic mouthwash) 1:1:1 diphenhydrAMINE(Benadryl) 12.5mg/5ml liq, aluminum & magnesium hydroxide-simethicone (Maalox), LIDOcaine viscous 2% 5 mLs, Swish & Spit, Before meals & nightly PRN    cholecalciferol (vitamin D3) (VITAMIN D3) 400 Units, Oral, Daily    colestipoL (COLESTID) 1 g, Oral, 2 times daily    cyanocobalamin (VITAMIN B-12) 100 mcg, Oral, Daily    digoxin (LANOXIN) 125 mcg, Oral    diphenoxylate-atropine 2.5-0.025 mg (LOMOTIL) 2.5-0.025 mg per tablet 1-2 tablets, Oral, 4 times daily PRN    famotidine (PEPCID) 10 mg, Oral, 2 times daily    flash glucose sensor (FREESTYLE DERICK 14 DAY SENSOR) Kit 1 each, Misc.(Non-Drug; Combo Route), Every 14 days    fluorouraciL (EFUDEX) 5 % cream 1 application , Topical, 2 times daily    fluticasone propionate (FLONASE) 50 mcg/actuation nasal spray 1 spray, Each Nostril, Daily    ipratropium (ATROVENT) 21 mcg (0.03 %) nasal spray 1 spray, Each Nostril, 2 times daily    lactulose (CHRONULAC) 10 gram/15 mL solution 30 mLs, Oral, 2 times daily    LENVIMA 8 mg, Oral, 2 times daily    levothyroxine (SYNTHROID) 100 MCG tablet 1 tablet, Oral, Daily    loratadine (CLARITIN) 10 mg, Oral, Daily    losartan (COZAAR) 25 mg, Oral, Daily    magnesium gluconate 27.5 mg magne- sium (500 mg) Tab 500 mg, Oral, Nightly    multivitamin with folic acid 400 mcg Tab 1 tablet, Oral, Daily    niacinamide 500 mg, Oral, Nightly    ondansetron (ZOFRAN) 8 mg, Oral, Every 8 hours PRN    pembrolizumab (KEYTRUDA) 25 mg, Intravenous, Three times weekly    pravastatin (PRAVACHOL) 40 mg, Oral, Daily    vitamin E 100 Units, Oral, Daily       Review of patient's allergies indicates:   Allergen Reactions    Meperidine Hives and Itching    Morphine Hives and Itching    Propoxyphene n-acetaminophen Hives and Itching    Iodinated contrast media Itching and Rash     12/7/22 - C/o delayed reaction following day with rash &  "itching to L knee to feet.        Immunization History   Administered Date(s) Administered    COVID-19, MRNA, LN-S, PF (MODERNA FULL 0.5 ML DOSE) 02/10/2021, 03/18/2021, 04/13/2021        Patient Care Team:  Ever Quezada MD as PCP - General (Internal Medicine)  Mohit Garcia OD (Ophthalmology)  Anders Alvarez MD as Consulting Physician (Cardiology)  Oncology, [facility]  Rad (Radiation Oncology)    Subjective:     Review of Systems    12 point review of systems conducted, negative except as stated in the history of present illness. See HPI for details.    Objective:     Visit Vitals  /74 (BP Location: Left arm, Patient Position: Sitting, BP Method: Medium (Manual))   Pulse 72   Temp 97.4 °F (36.3 °C) (Temporal)   Resp 16   Ht 5' 6" (1.676 m)   Wt 72.1 kg (159 lb)   SpO2 99%   BMI 25.66 kg/m²       Physical Exam  General : Alert and oriented, No acute distress, afebrile.  Eye : PERRLA. EOMI. Normal conjunctiva, Sclerae are nonicteric.   Respiratory : Respirations are non-labored and clear to auscultation bilaterally. Symmetrical air entry bilaterally, no crackles, no wheezes, no rhonchi. No cyanosis, no clubbing.  Cardiovascular : Normal rate, Regular rhythm. No murmurs, rubs, or gallops. Pulses are 2+ throughout. No JVD. No Edema.  Gastrointestinal : Soft, nontender, non-distended, bowel sounds are present in all quadrants, no organomegaly, no guarding, no rebound.  Musculoskeletal : Normal range of motion throughout. No muscle tenderness.  Integumentary : Warm, moist, intact.  Neurologic : Alert, Oriented, no FND  Psychiatric : Cooperative, Appropriate mood & affect.     Labs Reviewed:     Chemistry:  Lab Results   Component Value Date     02/12/2024    K 5.3 (H) 02/12/2024    CHLORIDE 105 02/12/2024    BUN 13.7 02/12/2024    CREATININE 1.02 02/12/2024    EGFRNORACEVR >60 02/12/2024    GLUCOSE 159 (H) 02/12/2024    CALCIUM 8.9 02/12/2024    ALKPHOS 49 02/12/2024    LABPROT " 6.7 02/12/2024    ALBUMIN 3.7 02/12/2024    BILIDIR 0.1 11/29/2021    IBILI 0.20 11/29/2021    AST 23 02/12/2024    ALT 21 02/12/2024    MG 2.2 10/02/2017    PHOS 3.1 10/02/2017    RFCMEORE05FL 30.1 02/12/2024    TSH 3.156 02/12/2024    PSA 3.04 02/12/2024        Lab Results   Component Value Date    HGBA1C 6.5 02/12/2024        Hematology:  Lab Results   Component Value Date    WBC 5.16 02/12/2024    HGB 14.7 02/12/2024    HCT 43.5 02/12/2024     02/12/2024       Lipid Panel:  Lab Results   Component Value Date    CHOL 229 (H) 02/12/2024    HDL 33 (L) 02/12/2024    .00 07/18/2023    TRIG 461 (H) 02/12/2024    TOTALCHOLEST 7 (H) 02/12/2024        Urine:  Lab Results   Component Value Date    COLORUA Light-Yellow 02/12/2024    APPEARANCEUA Clear 02/12/2024    SGUA 1.014 02/12/2024    PHUA 5.5 02/12/2024    PROTEINUA Negative 02/12/2024    GLUCOSEUA Normal 02/12/2024    KETONESUA Negative 02/12/2024    BLOODUA Negative 02/12/2024    NITRITESUA Negative 02/12/2024    LEUKOCYTESUR Negative 02/12/2024    RBCUA 0-5 02/12/2024    WBCUA 0-5 02/12/2024    BACTERIA None Seen 02/12/2024    SQEPUA None Seen 02/12/2024    CREATRANDUR 91.5 02/12/2024        Assessment:       ICD-10-CM ICD-9-CM   1. Medicare annual wellness visit, subsequent  Z00.00 V70.0   2. Malignant neoplasm metastatic to both lungs  C78.01 197.0    C78.02    3. Thrombocytopenia, unspecified  D69.6 287.5   4. Type 2 diabetes mellitus without complication, without long-term current use of insulin  E11.9 250.00   5. Clear cell carcinoma of left kidney  C64.2 189.0        Plan:     1. Medicare annual wellness visit, subsequent  Assessment & Plan:  General health maintenance education given, labs reviewed.  A1c at 6.5 just finished his chemo pill on Friday has horrendous GI side effects from the medication and has very poor oral intake, diarrhea.  A1c sitting at 6.5 fasting sugars at 150 has no evidence of end-organ damage, no microalbuminuria,  normal kidney function will continue to monitor him closely.  Follows up with his oncologist this afternoon and goes to [facility] in March for updated scans.  Will see him back in 3 months.  Declines vaccines        2. Malignant neoplasm metastatic to both lungs  Assessment & Plan:  Patient has metastatic cancer of renal primary. The cancer has metastasized to lung. The patient is under the care of an outpatient oncologist. The patient is undergoing chemo with Dr Gonzalez, follows up at [facility] in March for restaging        3. Thrombocytopenia, unspecified  Assessment & Plan:  Currently at 142,000        4. Type 2 diabetes mellitus without complication, without long-term current use of insulin    5. Clear cell carcinoma of left kidney  Overview:  diagnosed 6/2017    Assessment & Plan:  horrendous GI side effects from the medication and has very poor oral intake, diarrhea.  A1c sitting at 6.5 fasting sugars at 1:50 a.m. has no evidence of end-organ damage, no microalbuminuria, normal kidney function will continue to monitor him closely.  Follows up with his oncologist this afternoon and goes to [facility] in March for updated scans.  Will see him back in 3 months.             The following assessments were completed and reviewed. See completed screening forms and assessments within the Encounter Summary.   [x] Health Risk Assessment   [x] CVD Risk Factors   [x] Obesity/Physical Activity -  Encouraged daily 30 minute physical activity x 5 days per week.   [x] Home Safety/Living Situation   [x] Alcohol Screen  [x] Depression (PHQ) Screen   [x] Timed Get Up and Go   [x] Whisper Test  [x] Cognitive Function/Impairment Screen   [x] Nutrition Screening  [x] ADL Screen   [x] Opioid Screen:  [x] Patient does not have a prescription for opioids.   [] Patient has a prescription for opioids but is at low risk for abuse.   [x] Substance Abuse Screen:   [x] Patient does not use illicit substances.   [] Patient screens  positive for substance use disorder.   Advance Care Planning   Advance Care Planning     Date: 02/15/2024  Patient did not wish or was not able to name a surrogate decision maker or provide an Advance Care Plan.       I attest to discussing Advance Care Planning with patient and/or family member.  Education was provided including the importance of the Health Care Power of , Advance Directives, and/or [place] documentation.  The patient expressed understanding to the importance of this information and discussion.       Provided patient with a 5-10 year written screening schedule and personal prevention plan. Recommendations were developed using the USPSTF age appropriate recommendations. Education, counseling, and referrals were provided as needed. After Visit Summary printed and given to patient, which includes a list of additional screenings\tests needed.    Follow up in about 3 months (around 5/15/2024) for with labs prior to visit, DIABETIC TUSHAR [facility]. In addition to their scheduled follow up, the patient has also been instructed to follow up on as needed basis.     Future Appointments   Date Time Provider Department Center   5/23/2024 10:20 AM Ever Quezada MD [id number]        Ever Quezada MD

## 2024-02-15 NOTE — ASSESSMENT & PLAN NOTE
horrendous GI side effects from the medication and has very poor oral intake, diarrhea.  A1c sitting at 6.5 fasting sugars at 1:50 a.m. has no evidence of end-organ damage, no microalbuminuria, normal kidney function will continue to monitor him closely.  Follows up with his oncologist this afternoon and goes to [facility] in March for updated scans.  Will see him back in 3 months.

## 2024-02-15 NOTE — ASSESSMENT & PLAN NOTE
General health maintenance education given, labs reviewed.  A1c at 6.5 just finished his chemo pill on Friday has horrendous GI side effects from the medication and has very poor oral intake, diarrhea.  A1c sitting at 6.5 fasting sugars at 150 has no evidence of end-organ damage, no microalbuminuria, normal kidney function will continue to monitor him closely.  Follows up with his oncologist this afternoon and goes to [facility] in March for updated scans.  Will see him back in 3 months.  Declines vaccines

## 2024-02-15 NOTE — ASSESSMENT & PLAN NOTE
Patient has metastatic cancer of renal primary. The cancer has metastasized to lung. The patient is under the care of an outpatient oncologist. The patient is undergoing chemo with Dr Gonzalez, follows up at [facility] in March for restaging

## 2024-03-27 ENCOUNTER — PATIENT MESSAGE (OUTPATIENT)
Dept: INTERNAL MEDICINE | Facility: CLINIC | Age: 74
End: 2024-03-27
Payer: MEDICARE

## 2024-03-27 DIAGNOSIS — E11.9 TYPE 2 DIABETES MELLITUS WITHOUT COMPLICATION, WITHOUT LONG-TERM CURRENT USE OF INSULIN: ICD-10-CM

## 2024-03-27 RX ORDER — FLASH GLUCOSE SENSOR
1 KIT MISCELLANEOUS
Qty: 6 KIT | Refills: 3 | Status: SHIPPED | OUTPATIENT
Start: 2024-03-27

## 2024-05-09 ENCOUNTER — TELEPHONE (OUTPATIENT)
Dept: INTERNAL MEDICINE | Facility: CLINIC | Age: 74
End: 2024-05-09
Payer: MEDICARE

## 2024-05-09 DIAGNOSIS — E11.9 TYPE 2 DIABETES MELLITUS WITHOUT COMPLICATION, WITHOUT LONG-TERM CURRENT USE OF INSULIN: Primary | ICD-10-CM

## 2024-05-09 NOTE — TELEPHONE ENCOUNTER
----- Message from Russell Stoddard MA sent at 5/9/2024  8:24 AM CDT -----  Regarding: PV 5/23/24 @ 10:20 Dr. Moon  1. Are there any outstanding tasks in the patient's chart? Yes, fasting labs    2. Is there any documentation in the chart? No    3.Has patient been seen in an ER, Urgent care clinic, or been admitted since last visit?  If yes, When, where, and why    4. Has patient seen any other healthcare providers since last visit?  If yes, when, where, and why    5. Has patient had any bloodwork or XR done since last visit?    6. Is patient signed up for patient portal?

## 2024-05-20 ENCOUNTER — LAB VISIT (OUTPATIENT)
Dept: LAB | Facility: HOSPITAL | Age: 74
End: 2024-05-20
Attending: INTERNAL MEDICINE
Payer: MEDICARE

## 2024-05-20 DIAGNOSIS — E11.9 TYPE 2 DIABETES MELLITUS WITHOUT COMPLICATION, WITHOUT LONG-TERM CURRENT USE OF INSULIN: ICD-10-CM

## 2024-05-20 PROBLEM — Z00.00 MEDICARE ANNUAL WELLNESS VISIT, SUBSEQUENT: Status: RESOLVED | Noted: 2024-02-15 | Resolved: 2024-05-20

## 2024-05-20 LAB
ALBUMIN SERPL-MCNC: 3.8 G/DL (ref 3.4–4.8)
ALBUMIN/GLOB SERPL: 1.2 RATIO (ref 1.1–2)
ALP SERPL-CCNC: 43 UNIT/L (ref 40–150)
ALT SERPL-CCNC: 14 UNIT/L (ref 0–55)
ANION GAP SERPL CALC-SCNC: 6 MEQ/L
AST SERPL-CCNC: 15 UNIT/L (ref 5–34)
BACTERIA #/AREA URNS AUTO: ABNORMAL /HPF
BILIRUB SERPL-MCNC: 0.4 MG/DL
BILIRUB UR QL STRIP.AUTO: NEGATIVE
BUN SERPL-MCNC: 13.6 MG/DL (ref 8.4–25.7)
CALCIUM SERPL-MCNC: 9.3 MG/DL (ref 8.8–10)
CHLORIDE SERPL-SCNC: 103 MMOL/L (ref 98–107)
CHOLEST SERPL-MCNC: 166 MG/DL
CHOLEST/HDLC SERPL: 5 {RATIO} (ref 0–5)
CLARITY UR: CLEAR
CO2 SERPL-SCNC: 30 MMOL/L (ref 23–31)
COLOR UR AUTO: COLORLESS
CREAT SERPL-MCNC: 1.06 MG/DL (ref 0.73–1.18)
CREAT UR-MCNC: 21.1 MG/DL (ref 63–166)
CREAT/UREA NIT SERPL: 13
EST. AVERAGE GLUCOSE BLD GHB EST-MCNC: 145.6 MG/DL
GFR SERPLBLD CREATININE-BSD FMLA CKD-EPI: >60 ML/MIN/1.73/M2
GLOBULIN SER-MCNC: 3.1 GM/DL (ref 2.4–3.5)
GLUCOSE SERPL-MCNC: 150 MG/DL (ref 82–115)
GLUCOSE UR QL STRIP: NORMAL
HBA1C MFR BLD: 6.7 %
HDLC SERPL-MCNC: 34 MG/DL (ref 35–60)
HGB UR QL STRIP: NEGATIVE
KETONES UR QL STRIP: NEGATIVE
LDLC SERPL CALC-MCNC: 67 MG/DL (ref 50–140)
LEUKOCYTE ESTERASE UR QL STRIP: NEGATIVE
MICROALBUMIN UR-MCNC: <5 UG/ML
MICROALBUMIN/CREAT RATIO PNL UR: ABNORMAL
MUCOUS THREADS URNS QL MICRO: ABNORMAL /LPF
NITRITE UR QL STRIP: NEGATIVE
PH UR STRIP: 6.5 [PH]
POTASSIUM SERPL-SCNC: 5.6 MMOL/L (ref 3.5–5.1)
PROT SERPL-MCNC: 6.9 GM/DL (ref 5.8–7.6)
PROT UR QL STRIP: NEGATIVE
RBC #/AREA URNS AUTO: ABNORMAL /HPF
SODIUM SERPL-SCNC: 139 MMOL/L (ref 136–145)
SP GR UR STRIP.AUTO: 1 (ref 1–1.03)
SQUAMOUS #/AREA URNS LPF: ABNORMAL /HPF
TRIGL SERPL-MCNC: 324 MG/DL (ref 34–140)
UROBILINOGEN UR STRIP-ACNC: NORMAL
VLDLC SERPL CALC-MCNC: 65 MG/DL
WBC #/AREA URNS AUTO: ABNORMAL /HPF

## 2024-05-20 PROCEDURE — 83036 HEMOGLOBIN GLYCOSYLATED A1C: CPT

## 2024-05-20 PROCEDURE — 82043 UR ALBUMIN QUANTITATIVE: CPT

## 2024-05-20 PROCEDURE — 81001 URINALYSIS AUTO W/SCOPE: CPT

## 2024-05-20 PROCEDURE — 80061 LIPID PANEL: CPT

## 2024-05-20 PROCEDURE — 36415 COLL VENOUS BLD VENIPUNCTURE: CPT

## 2024-05-20 PROCEDURE — 80053 COMPREHEN METABOLIC PANEL: CPT

## 2024-05-20 RX ORDER — TAMSULOSIN HYDROCHLORIDE 0.4 MG/1
0.4 CAPSULE ORAL DAILY
Qty: 30 CAPSULE | Refills: 11 | Status: SHIPPED | OUTPATIENT
Start: 2024-05-20 | End: 2024-05-23

## 2024-05-23 ENCOUNTER — OFFICE VISIT (OUTPATIENT)
Dept: INTERNAL MEDICINE | Facility: CLINIC | Age: 74
End: 2024-05-23
Payer: MEDICARE

## 2024-05-23 VITALS
DIASTOLIC BLOOD PRESSURE: 82 MMHG | RESPIRATION RATE: 16 BRPM | OXYGEN SATURATION: 100 % | SYSTOLIC BLOOD PRESSURE: 124 MMHG | HEART RATE: 68 BPM | HEIGHT: 66 IN | BODY MASS INDEX: 25.71 KG/M2 | WEIGHT: 160 LBS | TEMPERATURE: 98 F

## 2024-05-23 DIAGNOSIS — E87.5 HYPERKALEMIA: ICD-10-CM

## 2024-05-23 DIAGNOSIS — E08.44 DIABETES MELLITUS DUE TO UNDERLYING CONDITION WITH DIABETIC AMYOTROPHY, WITHOUT LONG-TERM CURRENT USE OF INSULIN: ICD-10-CM

## 2024-05-23 DIAGNOSIS — E11.9 TYPE 2 DIABETES MELLITUS WITHOUT COMPLICATION, WITHOUT LONG-TERM CURRENT USE OF INSULIN: ICD-10-CM

## 2024-05-23 DIAGNOSIS — R33.9 URINARY RETENTION: Primary | ICD-10-CM

## 2024-05-23 PROCEDURE — 99214 OFFICE O/P EST MOD 30 MIN: CPT | Mod: ,,, | Performed by: INTERNAL MEDICINE

## 2024-05-23 RX ORDER — TAMSULOSIN HYDROCHLORIDE 0.4 MG/1
0.4 CAPSULE ORAL DAILY
Qty: 30 CAPSULE | Refills: 0 | Status: SHIPPED | OUTPATIENT
Start: 2024-05-23 | End: 2025-05-23

## 2024-05-23 RX ORDER — CIPROFLOXACIN 250 MG/1
250 TABLET, FILM COATED ORAL EVERY 12 HOURS
Qty: 20 TABLET | Refills: 0 | Status: SHIPPED | OUTPATIENT
Start: 2024-05-23 | End: 2024-06-02

## 2024-05-23 RX ORDER — PROPRANOLOL HYDROCHLORIDE 10 MG/1
10 TABLET ORAL 2 TIMES DAILY
Qty: 60 TABLET | Refills: 0 | Status: SHIPPED | OUTPATIENT
Start: 2024-05-23 | End: 2024-06-12

## 2024-05-23 RX ORDER — FLASH GLUCOSE SENSOR
1 KIT MISCELLANEOUS
Qty: 2 KIT | Refills: 6 | Status: SHIPPED | OUTPATIENT
Start: 2024-05-23

## 2024-05-23 RX ORDER — FAMOTIDINE 20 MG/1
10 TABLET, FILM COATED ORAL DAILY
COMMUNITY

## 2024-05-23 NOTE — PROGRESS NOTES
Internal Medicine    Patient ID: [id number]     Chief Complaint: Diabetes (3 month f/u)      HPI:     Nick Son Jr is a 73 y.o. male here today for a follow up.   Only way he can urinate is standing up  He has to force urination  He has not seen at [facility] in quite some time  Urinalysis is bland specific gravity of 1.004 there is no blood there is no bacteria there is no white cells there is no leukocytes.  He reports that he has to force urination.  I do not see any meds that would be causing urinary retention  He is back on chemo  Sees Dr Wills  No recent PSA  He is tender over his bladder does not report that he needs to urinate at current  Current estimated average sugar of 145 with A1c of 6.7  Currently not on treatment, Rybelsus intolerance secondary to nausea vomiting constipation    Lenvima and Keytruda regimen. He had recent evaluation at [facility] which shows continued remission of his metastatic renal cell carcinoma. There has been a gradual reduction in his Lenvima dosage regimen to 4 mg alternating with 8 mg. Overall quality of life and GI see side effect profile is markedly improved at this dosage level. He continues on Keytruda every 3 weeks. 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 March 27, 2024   Past Medical History:   Diagnosis Date    Diabetes mellitus, type 2     Personal history of colonic polyps         Past Surgical History:   Procedure Laterality Date    APPENDECTOMY      COLONOSCOPY  05/10/2017    FRACTURE SURGERY  Age 11    Broke right arm    KIDNEY TRANSPLANT  2017    Right kidney removed.   No transplant    SPINE SURGERY  has had 5 surgeries        Social History     Tobacco Use    Smoking status: Never    Smokeless tobacco: Never   Substance and Sexual Activity    Alcohol use: Yes     Comment: Occassioanlly    Drug use: Never    Sexual activity: Yes     Partners: Female     Birth control/protection: None         Current Outpatient Medications   Medication Instructions    cholecalciferol (vitamin D3) (VITAMIN D3) 400 Units, Oral, Daily    ciprofloxacin HCl (CIPRO) 250 mg, Oral, Every 12 hours    cyanocobalamin (VITAMIN B-12) 100 mcg, Oral, Daily    digoxin (LANOXIN) 125 mcg, Oral    famotidine (PEPCID) 10 mg, Oral, Daily    flash glucose sensor (FREESTYLE DERICK 14 DAY SENSOR) Kit 1 each, Misc.(Non-Drug; Combo Route), Every 14 days    flash glucose sensor (FREESTYLE DERICK 2 SENSOR) Kit 1 each, Misc.(Non-Drug; Combo Route), Every 14 days    LENVIMA 8 mg, Oral, Daily    levothyroxine (SYNTHROID) 100 MCG tablet 1 tablet, Oral, Daily    loratadine (CLARITIN) 10 mg, Oral, Daily    losartan (COZAAR) 25 mg, Oral, Daily    multivitamin with folic acid 400 mcg Tab 1 tablet, Oral, Daily    niacinamide 500 mg, Oral, Nightly    ondansetron (ZOFRAN) 8 mg, Oral, Every 8 hours PRN    pembrolizumab (KEYTRUDA) 25 mg, Intravenous, Three times weekly    pravastatin (PRAVACHOL) 40 mg, Oral, Daily    propranoloL (INDERAL) 10 mg, Oral, 2 times daily    tamsulosin (FLOMAX) 0.4 mg, Oral, Daily       Review of patient's allergies indicates:   Allergen Reactions    Meperidine Hives and Itching    Morphine Hives and Itching    Propoxyphene n-acetaminophen Hives and Itching    Iodinated contrast media Itching and Rash     12/7/22 - C/o delayed reaction following day with rash & itching to L knee to feet.        Patient Care Team:  Ever Quezada MD as PCP - General (Internal Medicine)  Mohit Garcia OD (Ophthalmology)  Anders Alvarez MD as Consulting Physician (Cardiology)  Oncology, DRISS Pyle - Rad (Radiation Oncology)     Subjective:     Review of Systems    12 point review of systems conducted, negative except as stated in the history of present illness. See HPI for details.    Objective:     Visit Vitals  /82 (BP Location: Left arm, Patient Position: Sitting, BP Method: Medium (Manual))   Pulse 68   Temp 97.8 °F  "(36.6 °C) (Temporal)   Resp 16   Ht 5' 6" (1.676 m)   Wt 72.6 kg (160 lb)   SpO2 100%   BMI 25.82 kg/m²       Physical Exam  Constitutional:       General: He is not in acute distress.     Appearance: Normal appearance. He is not toxic-appearing.   HENT:      Head: Normocephalic and atraumatic.      Mouth/Throat:      Mouth: Mucous membranes are moist.      Pharynx: Oropharynx is clear.   Eyes:      Extraocular Movements: Extraocular movements intact.      Conjunctiva/sclera: Conjunctivae normal.      Pupils: Pupils are equal, round, and reactive to light.   Cardiovascular:      Rate and Rhythm: Normal rate and regular rhythm.      Pulses: Normal pulses.      Heart sounds: S1 normal and S2 normal. No murmur heard.     No gallop.   Pulmonary:      Effort: Pulmonary effort is normal. No respiratory distress.      Breath sounds: No decreased air movement.   Abdominal:      General: Bowel sounds are normal.      Palpations: Abdomen is soft.   Genitourinary:     Comments: Tenderness noted over the bladder  Musculoskeletal:         General: Normal range of motion.      Cervical back: Normal range of motion and neck supple.   Skin:     General: Skin is warm and dry.      Capillary Refill: Capillary refill takes less than 2 seconds.      Coloration: Skin is not cyanotic.   Neurological:      General: No focal deficit present.      Mental Status: He is alert and oriented to person, place, and time.      Sensory: Sensation is intact.      Motor: Motor function is intact.   Psychiatric:         Mood and Affect: Mood and affect normal.         Behavior: Behavior is cooperative.         Labs Reviewed:     Chemistry:  Lab Results   Component Value Date     05/20/2024    K 5.6 (H) 05/20/2024    CHLORIDE 103 05/20/2024    BUN 13.6 05/20/2024    CREATININE 1.06 05/20/2024    EGFRNORACEVR >60 05/20/2024    GLUCOSE 150 (H) 05/20/2024    CALCIUM 9.3 05/20/2024    ALKPHOS 43 05/20/2024    LABPROT 6.9 05/20/2024    ALBUMIN 3.8 " 05/20/2024    BILIDIR 0.1 11/29/2021    IBILI 0.20 11/29/2021    AST 15 05/20/2024    ALT 14 05/20/2024    MG 2.2 10/02/2017    PHOS 3.1 10/02/2017    [id number] 30.1 02/12/2024    TSH 1.818 05/08/2024    PSA 3.04 02/12/2024        Lab Results   Component Value Date    HGBA1C 6.7 05/20/2024        Hematology:  Lab Results   Component Value Date    WBC 5.16 02/12/2024    HGB 14.7 02/12/2024    HCT 43.5 02/12/2024     02/12/2024       Lipid Panel:  Lab Results   Component Value Date    CHOL 166 05/20/2024    HDL 34 (L) 05/20/2024    LDL 67.00 05/20/2024    TRIG 324 (H) 05/20/2024    TOTALCHOLEST 5 05/20/2024        Urine:  Lab Results   Component Value Date    COLORUA Colorless 05/20/2024    APPEARANCEUA Clear 05/20/2024    SGUA 1.004 (L) 05/20/2024    PHUA 6.5 05/20/2024    PROTEINUA Negative 05/20/2024    GLUCOSEUA Normal 05/20/2024    KETONESUA Negative 05/20/2024    BLOODUA Negative 05/20/2024    NITRITESUA Negative 05/20/2024    LEUKOCYTESUR Negative 05/20/2024    RBCUA 0-5 05/20/2024    WBCUA None Seen 05/20/2024    BACTERIA None Seen 05/20/2024    SQEPUA None Seen 05/20/2024    CREATRANDUR 21.1 (L) 05/20/2024        Assessment:       ICD-10-CM ICD-9-CM   1. Urinary retention  R33.9 788.20   2. Type 2 diabetes mellitus without complication, without long-term current use of insulin  E11.9 250.00   3. 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 Urinary retention  Assessment & Plan:  Patient with complaints of urinary retention  States that he has to stand up to urinate he has to really try very hard to initiate a urinary stream and he has drinking a tremendous amount of water  Tenderness over the bladder today does not state that he needs to urinate at current.  Will notify Dr. Hu so he can get in for an appointment and do an ultrasound.  Urinalysis is benign but will cover with Cipro, start Flomax and propranolol hopefully he can  get into see his urologist in the next few days    Orders:  -     Ambulatory referral/consult to Urology; Future; Expected date: 05/30/2024  -     Comprehensive Metabolic Panel; Future; Expected date: 05/23/2024  -     Hemoglobin A1C; Future; Expected date: 05/23/2024    2. Type 2 diabetes mellitus without complication, without long-term current use of insulin  Assessment & Plan:  Estimated average sugars of 145 his A1c is at 6.5 can not tolerate rib else's we are going to leave him off treatment for now  We are going to see him back in 8 weeks      3. 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 for 10 days  Dispense: 20 tablet; Refill: 0  -     tamsulosin (FLOMAX) 0.4 mg Cap; Take 1 capsule (0.4 mg total) by mouth once daily.  Dispense: 30 capsule; Refill: 0  -     propranoloL (INDERAL) 10 MG tablet; Take 1 tablet (10 mg total) by mouth 2 (two) times daily.  Dispense: 60 tablet; Refill: 0  -     flash glucose sensor (FREESTYLE DERICK 2 SENSOR) Kit; 1 each by Misc.(Non-Drug; Combo Route) route every 14 (fourteen) days.  Dispense: 2 kit; Refill: 6         Follow up in about 8 weeks (around 7/18/2024) for DIABETIC REVISIT, with labs prior to visit. In addition to their scheduled follow up, the patient has also been instructed to follow up on as needed basis.     No future appointments.     Ever Quezada MD

## 2024-05-23 NOTE — ASSESSMENT & PLAN NOTE
Estimated average sugars of 145 his A1c is at 6.5 can not tolerate rib else's we are going to leave him off treatment for now  We are going to see him back in 8 weeks

## 2024-05-23 NOTE — ASSESSMENT & PLAN NOTE
Patient with complaints of urinary retention  States that he has to stand up to urinate he has to really try very hard to initiate a urinary stream and he has drinking a tremendous amount of water  Tenderness over the bladder today does not state that he needs to urinate at current.  Will notify Dr. Hu so he can get in for an appointment and do an ultrasound.  Urinalysis is benign but will cover with Cipro, start Flomax and propranolol hopefully he can get into see his urologist in the next few days

## 2024-05-28 ENCOUNTER — TELEPHONE (OUTPATIENT)
Dept: INTERNAL MEDICINE | Facility: CLINIC | Age: 74
End: 2024-05-28
Payer: MEDICARE

## 2024-05-28 NOTE — TELEPHONE ENCOUNTER
----- Message from Li Meier sent at 2024  9:56 AM CDT -----  Regarding: Pharmacy claim rejection and Prior Authorization  Gu: [id number]  Name: Nick Huy  : 1950

## 2024-06-11 ENCOUNTER — PATIENT MESSAGE (OUTPATIENT)
Dept: INTERNAL MEDICINE | Facility: CLINIC | Age: 74
End: 2024-06-11
Payer: MEDICARE

## 2024-06-12 ENCOUNTER — LAB VISIT (OUTPATIENT)
Dept: LAB | Facility: HOSPITAL | Age: 74
End: 2024-06-12
Attending: INTERNAL MEDICINE
Payer: MEDICARE

## 2024-06-12 ENCOUNTER — OFFICE VISIT (OUTPATIENT)
Dept: INTERNAL MEDICINE | Facility: CLINIC | Age: 74
End: 2024-06-12
Payer: MEDICARE

## 2024-06-12 VITALS
OXYGEN SATURATION: 99 % | DIASTOLIC BLOOD PRESSURE: 84 MMHG | SYSTOLIC BLOOD PRESSURE: 136 MMHG | WEIGHT: 160 LBS | BODY MASS INDEX: 25.71 KG/M2 | HEART RATE: 70 BPM | TEMPERATURE: 98 F | RESPIRATION RATE: 16 BRPM | HEIGHT: 66 IN

## 2024-06-12 DIAGNOSIS — I10 PRIMARY HYPERTENSION: ICD-10-CM

## 2024-06-12 DIAGNOSIS — R33.9 URINARY RETENTION: ICD-10-CM

## 2024-06-12 DIAGNOSIS — E11.9 TYPE 2 DIABETES MELLITUS WITHOUT COMPLICATION, WITHOUT LONG-TERM CURRENT USE OF INSULIN: ICD-10-CM

## 2024-06-12 DIAGNOSIS — E87.5 HYPERKALEMIA: ICD-10-CM

## 2024-06-12 DIAGNOSIS — E87.5 HYPERKALEMIA, TRANSCELLULAR SHIFTS: Primary | ICD-10-CM

## 2024-06-12 DIAGNOSIS — I25.10 ASCVD (ARTERIOSCLEROTIC CARDIOVASCULAR DISEASE): ICD-10-CM

## 2024-06-12 LAB
ANION GAP SERPL CALC-SCNC: 5 MEQ/L
BACTERIA #/AREA URNS AUTO: ABNORMAL /HPF
BILIRUB UR QL STRIP.AUTO: NEGATIVE
BUN SERPL-MCNC: 16.5 MG/DL (ref 8.4–25.7)
CALCIUM SERPL-MCNC: 9.6 MG/DL (ref 8.8–10)
CHLORIDE SERPL-SCNC: 101 MMOL/L (ref 98–107)
CLARITY UR: CLEAR
CO2 SERPL-SCNC: 30 MMOL/L (ref 23–31)
COLOR UR AUTO: ABNORMAL
CREAT SERPL-MCNC: 1.14 MG/DL (ref 0.73–1.18)
CREAT/UREA NIT SERPL: 14
GFR SERPLBLD CREATININE-BSD FMLA CKD-EPI: >60 ML/MIN/1.73/M2
GLUCOSE SERPL-MCNC: 172 MG/DL (ref 82–115)
GLUCOSE UR QL STRIP: NORMAL
HGB UR QL STRIP: NEGATIVE
KETONES UR QL STRIP: NEGATIVE
LEUKOCYTE ESTERASE UR QL STRIP: NEGATIVE
MUCOUS THREADS URNS QL MICRO: ABNORMAL /LPF
NITRITE UR QL STRIP: NEGATIVE
OSMOLALITY SERPL: 297 MOSM/KG (ref 280–300)
OSMOLALITY UR: 340 MOSM/KG (ref 300–1300)
PH UR STRIP: 5.5 [PH]
POTASSIUM SERPL-SCNC: 5.5 MMOL/L (ref 3.5–5.1)
POTASSIUM UR-SCNC: 36.4 MMOL/L
PROT UR QL STRIP: NEGATIVE
RBC #/AREA URNS AUTO: ABNORMAL /HPF
SODIUM SERPL-SCNC: 136 MMOL/L (ref 136–145)
SP GR UR STRIP.AUTO: 1.01 (ref 1–1.03)
SQUAMOUS #/AREA URNS LPF: ABNORMAL /HPF
UROBILINOGEN UR STRIP-ACNC: NORMAL
WBC #/AREA URNS AUTO: ABNORMAL /HPF

## 2024-06-12 PROCEDURE — 83930 ASSAY OF BLOOD OSMOLALITY: CPT

## 2024-06-12 PROCEDURE — 80048 BASIC METABOLIC PNL TOTAL CA: CPT

## 2024-06-12 PROCEDURE — 99214 OFFICE O/P EST MOD 30 MIN: CPT | Mod: ,,, | Performed by: INTERNAL MEDICINE

## 2024-06-12 PROCEDURE — 36415 COLL VENOUS BLD VENIPUNCTURE: CPT

## 2024-06-12 RX ORDER — GLIPIZIDE 5 MG/1
5 TABLET, FILM COATED, EXTENDED RELEASE ORAL
Qty: 90 TABLET | Refills: 3 | Status: SHIPPED | OUTPATIENT
Start: 2024-06-12 | End: 2025-06-12

## 2024-06-12 RX ORDER — MONTELUKAST SODIUM 4 MG/1
1 TABLET, CHEWABLE ORAL 2 TIMES DAILY
COMMUNITY
Start: 2024-06-11 | End: 2025-06-11

## 2024-06-12 RX ORDER — DIPHENOXYLATE HYDROCHLORIDE AND ATROPINE SULFATE 2.5; .025 MG/1; MG/1
1-2 TABLET ORAL
COMMUNITY
Start: 2024-06-11

## 2024-06-12 RX ORDER — DOXAZOSIN 1 MG/1
1 TABLET ORAL NIGHTLY
Qty: 90 TABLET | Refills: 3 | Status: SHIPPED | OUTPATIENT
Start: 2024-06-12 | End: 2025-06-12

## 2024-06-12 RX ORDER — LACTULOSE 10 G/15ML
SOLUTION ORAL; RECTAL
COMMUNITY
Start: 2024-05-23

## 2024-06-12 NOTE — ASSESSMENT & PLAN NOTE
This patient has hyperkalemia   The likely etiology of the hyperkalemia is multifactorial from hyperglycemia and Medication induced.    Combination of losartan and Keytruda   Keytruda contributing to some hypoaldosteronism.    Advised patient to discontinue losartan.    Will start Doxazosin 1mg daily; may increase dose in a week to 2mg if needed.   Also likely related to some trans cellular shift of potassium secondary to hyperglycemia- currently not on diabetic medicines. History of intolerance to G LP 1; Keytruda often makes patient nauseated with poor oral intake, will start glipizide 5 mg daily; monitor CBGS placed Rob  Patient to update us in the next several days on blood pressure will repeat BMP in 1 week  Case discussed with Dr. Gonzalez    Still needs follow up with Urology for poor urine flow

## 2024-06-12 NOTE — PROGRESS NOTES
Internal Medicine    Patient ID: [id number]     Chief Complaint: Follow-up      HPI:     Nick Son Jr is a 73 y.o. male here today for a follow up.   Patient here today for hyperkalemia he was seen at [facility] potassium level noted to be at 5.9; labs were fasting.   He is on chronic losartan for renal protection for DM   He is also on Keytruda  Recheck of labs today-  patient is well hydrated sodium 136 potassium 5.5 chloride 101 CO2 30 glucose 172  Urine osmol 340, plasma osmolality 297, urine potassium of 36.4  Urinalysis with specific gravity of 1.009 pH of 5.5 there is no leukocytes, nitrites, white cells, red cells, bacteria.  He was on Digoxin- he was taking off of Digoxin in March by his physician at [facility]?  He has been on this for several years; currently developed some abnormal heart rhythm and heart failure with chemo.  He follows up shortly with his cardiologist  MRI of the pelvis with and without contrast this week revealed nothing except a enlarged prostate at 7.5 cm there was no description of anything concerning about the prostate no calcifications, etc..  CT without contrast was negative  He feels well  Past Medical History:   Diagnosis Date    Diabetes mellitus, type 2     Personal history of colonic polyps         Past Surgical History:   Procedure Laterality Date    APPENDECTOMY      COLONOSCOPY  05/10/2017    FRACTURE SURGERY  Age 11    Broke right arm    KIDNEY TRANSPLANT  2017    Right kidney removed.   No transplant    SPINE SURGERY  has had 5 surgeries        Social History     Tobacco Use    Smoking status: Never    Smokeless tobacco: Never   Substance and Sexual Activity    Alcohol use: Yes     Comment: Occassioanlly    Drug use: Never    Sexual activity: Yes     Partners: Female     Birth control/protection: None        Current Outpatient Medications   Medication Instructions    cholecalciferol (vitamin D3) (VITAMIN D3) 400 Units, Oral, Daily    colestipoL (COLESTID) 1 g,  Oral, 2 times daily    cyanocobalamin (VITAMIN B-12) 100 mcg, Oral, Daily    diphenoxylate-atropine 2.5-0.025 mg (LOMOTIL) 2.5-0.025 mg per tablet 1-2 tablets, Oral    doxazosin (CARDURA) 1 mg, Oral, Nightly    famotidine (PEPCID) 10 mg, Oral, Daily    flash glucose sensor (FREESTYLE DERICK 14 DAY SENSOR) Kit 1 each, Misc.(Non-Drug; Combo Route), Every 14 days    flash glucose sensor (FREESTYLE DERICK 2 SENSOR) Kit 1 each, Misc.(Non-Drug; Combo Route), Every 14 days    glipiZIDE 5 mg, Oral, With breakfast    lactulose (CHRONULAC) 10 gram/15 mL solution SMARTSI Milliliter(s) By Mouth Twice Daily PRN    LENVIMA 8 mg, Oral, Daily, Alternating 8 mg one day then 4 mg the next day    levothyroxine (SYNTHROID) 100 MCG tablet 1 tablet, Oral, Daily    multivitamin with folic acid 400 mcg Tab 1 tablet, Oral, Daily    niacinamide 500 mg, Oral, Nightly    ondansetron (ZOFRAN) 8 mg, Oral, Every 8 hours PRN    pembrolizumab (KEYTRUDA) 25 mg, Intravenous, Every 21 days    pravastatin (PRAVACHOL) 40 mg, Oral, Daily    tamsulosin (FLOMAX) 0.4 mg, Oral, Daily       Review of patient's allergies indicates:   Allergen Reactions    Meperidine Hives and Itching    Morphine Hives and Itching    Propoxyphene n-acetaminophen Hives and Itching    Iodinated contrast media Itching and Rash     22 - C/o delayed reaction following day with rash & itching to L knee to feet.        Patient Care Team:  Ever Quezada MD as PCP - General (Internal Medicine)  Mohit Garcia OD (Ophthalmology)  Anders Alvarez MD as Consulting Physician (Cardiology)  Oncology, DRISS Pyle - Rad (Radiation Oncology)     Subjective:     Review of Systems    12 point review of systems conducted, negative except as stated in the history of present illness. See HPI for details.    Objective:     Visit Vitals  /84 (BP Location: Left arm, Patient Position: Sitting, BP Method: Medium (Manual))   Pulse 70   Temp 97.8 °F (36.6 °C) (Temporal)  "  Resp 16   Ht 5' 6" (1.676 m)   Wt 72.6 kg (160 lb)   SpO2 99%   BMI 25.82 kg/m²       Physical Exam  Constitutional:       Appearance: Normal appearance.   HENT:      Head: Normocephalic and atraumatic.   Eyes:      Extraocular Movements: Extraocular movements intact.      Pupils: Pupils are equal, round, and reactive to light.   Cardiovascular:      Rate and Rhythm: Normal rate and regular rhythm.   Pulmonary:      Effort: Pulmonary effort is normal.      Breath sounds: Normal breath sounds.   Abdominal:      General: Bowel sounds are normal.      Palpations: Abdomen is soft.   Musculoskeletal:         General: Normal range of motion.   Skin:     General: Skin is warm and dry.   Neurological:      General: No focal deficit present.      Mental Status: He is alert.   Psychiatric:         Mood and Affect: Mood normal.         Labs Reviewed:     Chemistry:  Lab Results   Component Value Date     06/12/2024    K 5.5 (H) 06/12/2024    BUN 16.5 06/12/2024    CREATININE 1.14 06/12/2024    EGFRNORACEVR >60 06/12/2024    GLUCOSE 172 (H) 06/12/2024    CALCIUM 9.6 06/12/2024    ALKPHOS 43 05/20/2024    LABPROT 6.9 05/20/2024    ALBUMIN 3.8 05/20/2024    BILIDIR 0.1 11/29/2021    IBILI 0.20 11/29/2021    AST 15 05/20/2024    ALT 14 05/20/2024    MG 2.2 10/02/2017    PHOS 3.1 10/02/2017    [id number] 30.1 02/12/2024    TSH 2.089 05/29/2024    PSA 3.04 02/12/2024        Lab Results   Component Value Date    HGBA1C 6.7 05/20/2024        Hematology:  Lab Results   Component Value Date    WBC 5.16 02/12/2024    HGB 14.7 02/12/2024    HCT 43.5 02/12/2024     02/12/2024       Lipid Panel:  Lab Results   Component Value Date    CHOL 166 05/20/2024    HDL 34 (L) 05/20/2024    LDL 67.00 05/20/2024    TRIG 324 (H) 05/20/2024    TOTALCHOLEST 5 05/20/2024        Urine:  Lab Results   Component Value Date    APPEARANCEUA Clear 06/12/2024    SGUA 1.009 06/12/2024    PROTEINUA Negative 06/12/2024    KETONESUA Negative " 06/12/2024    LEUKOCYTESUR Negative 06/12/2024    RBCUA 0-5 06/12/2024    WBCUA 0-5 06/12/2024    BACTERIA None Seen 06/12/2024    SQEPUA None Seen 06/12/2024    CREATRANDUR 21.1 (L) 05/20/2024        Assessment:       ICD-10-CM ICD-9-CM   1. Hyperkalemia, transcellular shifts  E87.5 276.7   2. Urinary retention  R33.9 788.20   3. Primary hypertension  I10 401.9   4. ASCVD (arteriosclerotic cardiovascular disease)  I25.10 429.2     440.9        Plan:     1. Hyperkalemia, transcellular shifts  Assessment & Plan:  This patient has hyperkalemia   The likely etiology of the hyperkalemia is multifactorial from hyperglycemia and Medication induced.    Combination of losartan and Keytruda   Keytruda contributing to some hypoaldosteronism.    Advised patient to discontinue losartan.    Will start Doxazosin 1mg daily; may increase dose in a week to 2mg if needed.   Also likely related to some trans cellular shift of potassium secondary to hyperglycemia- currently not on diabetic medicines. History of intolerance to G LP 1; Keytruda often makes patient nauseated with poor oral intake, will start glipizide 5 mg daily; monitor CBGS placed Rob  Patient to update us in the next several days on blood pressure will repeat BMP in 1 week  Case discussed with Dr. Gonzalez    Still needs follow up with Urology for poor urine flow               Orders:  -     Ambulatory referral/consult to Urology; Future; Expected date: 06/12/2024  -     Basic Metabolic Panel; Future; Expected date: 06/19/2024    2. Urinary retention  -     Ambulatory referral/consult to Urology; Future; Expected date: 06/12/2024  -     Basic Metabolic Panel; Future; Expected date: 06/19/2024    3. Primary hypertension  -     Basic Metabolic Panel; Future; Expected date: 06/19/2024    4. ASCVD (arteriosclerotic cardiovascular disease)  -     Basic Metabolic Panel; Future; Expected date: 06/19/2024    Other orders  -     glipiZIDE 5 MG TR24; Take 1 tablet (5 mg total)  by mouth daily with breakfast.  Dispense: 90 tablet; Refill: 3  -     doxazosin (CARDURA) 1 MG tablet; Take 1 tablet (1 mg total) by mouth every evening.  Dispense: 90 tablet; Refill: 3         Follow up if symptoms worsen or fail to improve. In addition to their scheduled follow up, the patient has also been instructed to follow up on as needed basis.     Future Appointments   Date Time Provider Department [place]   7/31/2024  1:00 PM Ever Quezada MD [facility] [address]        Ever Quezada MD

## 2024-06-19 ENCOUNTER — ANESTHESIA (OUTPATIENT)
Dept: ENDOSCOPY | Facility: HOSPITAL | Age: 74
End: 2024-06-19
Payer: MEDICARE

## 2024-06-19 ENCOUNTER — ANESTHESIA EVENT (OUTPATIENT)
Dept: ENDOSCOPY | Facility: HOSPITAL | Age: 74
End: 2024-06-19
Payer: MEDICARE

## 2024-06-19 ENCOUNTER — HOSPITAL ENCOUNTER (OUTPATIENT)
Facility: HOSPITAL | Age: 74
Discharge: HOME | End: 2024-06-19
Attending: INTERNAL MEDICINE | Admitting: INTERNAL MEDICINE
Payer: MEDICARE

## 2024-06-19 DIAGNOSIS — C16.9 GASTRIC ADENOCARCINOMA: ICD-10-CM

## 2024-06-19 PROBLEM — C85.99 GASTRIC LYMPHOMA: Status: ACTIVE | Noted: 2024-06-19

## 2024-06-19 LAB
POCT GLUCOSE: 133 MG/DL (ref 70–110)
POCT GLUCOSE: 149 MG/DL (ref 70–110)

## 2024-06-19 PROCEDURE — 37000008 HC ANESTHESIA 1ST 15 MINUTES: Performed by: INTERNAL MEDICINE

## 2024-06-19 PROCEDURE — 88312 SPECIAL STAINS GROUP 1: CPT

## 2024-06-19 PROCEDURE — 27201423 OPTIME MED/SURG SUP & DEVICES STERILE SUPPLY: Performed by: INTERNAL MEDICINE

## 2024-06-19 PROCEDURE — 25000003 PHARM REV CODE 250: Performed by: STUDENT IN AN ORGANIZED HEALTH CARE EDUCATION/TRAINING PROGRAM

## 2024-06-19 PROCEDURE — 37000009 HC ANESTHESIA EA ADD 15 MINS: Performed by: INTERNAL MEDICINE

## 2024-06-19 PROCEDURE — 43239 EGD BIOPSY SINGLE/MULTIPLE: CPT | Performed by: INTERNAL MEDICINE

## 2024-06-19 PROCEDURE — 88305 TISSUE EXAM BY PATHOLOGIST: CPT | Performed by: INTERNAL MEDICINE

## 2024-06-19 PROCEDURE — 88313 SPECIAL STAINS GROUP 2: CPT

## 2024-06-19 PROCEDURE — 25000003 PHARM REV CODE 250

## 2024-06-19 RX ORDER — PROPOFOL 10 MG/ML
VIAL (ML) INTRAVENOUS CONTINUOUS PRN
Status: DISCONTINUED | OUTPATIENT
Start: 2024-06-19 | End: 2024-06-19

## 2024-06-19 RX ORDER — SODIUM CHLORIDE, SODIUM GLUCONATE, SODIUM ACETATE, POTASSIUM CHLORIDE AND MAGNESIUM CHLORIDE 30; 37; 368; 526; 502 MG/100ML; MG/100ML; MG/100ML; MG/100ML; MG/100ML
INJECTION, SOLUTION INTRAVENOUS CONTINUOUS
Status: DISCONTINUED | OUTPATIENT
Start: 2024-06-19 | End: 2024-06-19 | Stop reason: HOSPADM

## 2024-06-19 RX ORDER — FLUOROURACIL 50 MG/G
CREAM TOPICAL 2 TIMES DAILY
COMMUNITY

## 2024-06-19 RX ORDER — LIDOCAINE HYDROCHLORIDE 20 MG/ML
INJECTION INTRAVENOUS
Status: DISCONTINUED | OUTPATIENT
Start: 2024-06-19 | End: 2024-06-19

## 2024-06-19 RX ORDER — CETIRIZINE HYDROCHLORIDE 10 MG/1
10 TABLET ORAL DAILY
COMMUNITY

## 2024-06-19 RX ORDER — PANTOPRAZOLE SODIUM 40 MG/1
40 TABLET, DELAYED RELEASE ORAL DAILY
Qty: 90 TABLET | Refills: 3 | Status: SHIPPED | OUTPATIENT
Start: 2024-06-19 | End: 2025-06-19

## 2024-06-19 RX ORDER — LIDOCAINE HYDROCHLORIDE 20 MG/ML
INJECTION, SOLUTION EPIDURAL; INFILTRATION; INTRACAUDAL; PERINEURAL
Status: DISCONTINUED
Start: 2024-06-19 | End: 2024-06-19 | Stop reason: HOSPADM

## 2024-06-19 RX ORDER — PROPOFOL 10 MG/ML
VIAL (ML) INTRAVENOUS
Status: COMPLETED
Start: 2024-06-19 | End: 2024-06-19

## 2024-06-19 RX ORDER — FLUTICASONE PROPIONATE 50 MCG
1 SPRAY, SUSPENSION (ML) NASAL DAILY
COMMUNITY

## 2024-06-19 RX ADMIN — SODIUM CHLORIDE, SODIUM GLUCONATE, SODIUM ACETATE, POTASSIUM CHLORIDE AND MAGNESIUM CHLORIDE: 526; 502; 368; 37; 30 INJECTION, SOLUTION INTRAVENOUS at 08:06

## 2024-06-19 RX ADMIN — LIDOCAINE HYDROCHLORIDE 100 MG: 20 INJECTION INTRAVENOUS at 09:06

## 2024-06-19 RX ADMIN — Medication 150 MCG/KG/MIN: at 09:06

## 2024-06-19 RX ADMIN — SODIUM CHLORIDE, SODIUM GLUCONATE, SODIUM ACETATE, POTASSIUM CHLORIDE AND MAGNESIUM CHLORIDE: 526; 502; 368; 37; 30 INJECTION, SOLUTION INTRAVENOUS at 09:06

## 2024-06-19 RX ADMIN — Medication 50 MG: at 09:06

## 2024-06-19 NOTE — TRANSFER OF CARE
"Anesthesia Transfer of Care Note    Patient: Nick Son Jr    Procedure(s) Performed: Procedure(s) (LRB):  EGD (N/A)    Patient location: GI    Anesthesia Type: MAC    Transport from OR: Transported from OR on room air with adequate spontaneous ventilation    Post pain: adequate analgesia    Post assessment: no apparent anesthetic complications and tolerated procedure well    Post vital signs: stable    Level of consciousness: awake, alert and responds to stimulation    Nausea/Vomiting: no nausea/vomiting    Complications: none    Transfer of care protocol was followed      Last vitals: Visit Vitals  BP (!) 141/87 (BP Location: Left arm, Patient Position: Lying)   Pulse 78   Temp 36.4 °C (97.5 °F) (Tympanic)   Resp 16   Ht 5' 4" (1.626 m)   Wt 73.5 kg (162 lb)   SpO2 98%   BMI 27.81 kg/m²     "

## 2024-06-19 NOTE — H&P
History and Physical           HPI:     Patient is a 73 y.o. male known from previous evaluation.  He has a history of sizable adenomatous colon polyps, with his last examination in 2017.  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  He was evaluated and treated through [facility], also found incidentally to have a left renal mass that prompted nephrectomy for renal cell carcinoma.      The last underwent upper endoscopy in surveillance in March of 2022.    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  He has apparently been plagued by bouts of nausea and vomiting and diarrhea.  It seems he does well enough initially, and as the medicine accumulates, he will have issues at will prompted a 1 or 2 or even 3 week vacation from the meds with fairly rapid resolution of symptomatology.      He denies significant pyrosis, and has not been on a proton pump inhibitor.  Rather, he takes Pepcid as needed, and Zofran as needed.  He is on colestipol daily and occasionally resorts to Lomotil.     At any rate, it was suggested that he ought to consider upper endoscopic re-evaluation given these issues.  He is today scheduled for that study.      He is somewhat overdue for his routine surveillance colonoscopy, though with everything he is dealing with, I did not belabor that fact    PCP:  Ever Quezada MD    Review of patient's allergies indicates:   Allergen Reactions    Meperidine Hives and Itching    Morphine Hives and Itching    Propoxyphene n-acetaminophen Hives and Itching    Iodinated contrast media Itching and Rash     12/7/22 - C/o delayed reaction following day with rash & itching to L knee to feet.        Past Medical History:  Past Medical History:   Diagnosis Date    Cancer     Diabetes mellitus, type 2     Personal  history of colonic polyps       Past Surgical History:  Past Surgical History:   Procedure Laterality Date    APPENDECTOMY      COLONOSCOPY  05/10/2017    FRACTURE SURGERY  Age 11    Broke right arm    KIDNEY TRANSPLANT  2017    Right kidney removed.   No transplant    SPINE SURGERY  has had 5 surgeries      Family History:  Family History   Problem Relation Name Age of Onset    Cancer Father Nick Son Sr         cancer of esphogus    Cancer Paternal Uncle Manish Son         Kidney Cancer     Social History:  Social History     Tobacco Use    Smoking status: Never    Smokeless tobacco: Never   Substance Use Topics    Alcohol use: Yes     Comment: Occassioanlly         Review of Systems:     Review of Systems    Objective:     VITAL SIGNS: 24 HR MIN & MAX LAST    Temp  Min: 97.5 °F (36.4 °C)  Max: 97.5 °F (36.4 °C)  97.5 °F (36.4 °C)        BP  Min: 141/87  Max: 141/87  (!) 141/87     Pulse  Min: 78  Max: 78  78     Resp  Min: 16  Max: 16  16    SpO2  Min: 98 %  Max: 98 %  98 %      Physical Exam  Constitutional:       Appearance: Normal appearance.   HENT:      Mouth/Throat:      Mouth: Mucous membranes are moist.   Eyes:      Extraocular Movements: Extraocular movements intact.      Pupils: Pupils are equal, round, and reactive to light.   Cardiovascular:      Rate and Rhythm: Normal rate and regular rhythm.   Pulmonary:      Effort: Pulmonary effort is normal.   Abdominal:      Palpations: Abdomen is soft.      Tenderness: There is no abdominal tenderness.   Musculoskeletal:      Right lower leg: No edema.      Left lower leg: No edema.   Skin:     General: Skin is warm.   Neurological:      General: No focal deficit present.      Mental Status: He is alert and oriented to person, place, and time.   Psychiatric:         Mood and Affect: Mood normal.         Behavior: Behavior normal.           Recent Results (from the past 48 hour(s))   TSH    Collection Time: 06/18/24  1:06 PM   Result Value Ref Range     TSH 1.463 0.350 - 4.940 uIU/mL   POCT glucose    Collection Time: 06/19/24  8:06 AM   Result Value Ref Range    POCT Glucose 149 (H) 70 - 110 mg/dL       MRI ABDOMEN-PELVIS W W/O CONTRAST (XPD)    Result Date: 6/11/2024  Examination: MRI ABDOMEN & PELVIS W AND WO CONTRAST on 6/10/2024 7:37 PM. Clinical History: Malignant neoplasm of left kidney, except renal pelvis Indication: REstage amRCC Comparison: MRI abdomen pelvis with and without contrast on 03/11/2024 Technique: MRI ABDOMEN & PELVIS W AND WO CONTRAST Findings: Liver & biliary: No suspicious liver lesion. No biliary dilatation. Punctate gallstone noted. Spleen: Spleen is normal in size. Pancreas: No main duct dilatation or suspicious lesion. Adrenals: Status post left adrenalectomy. Unremarkable right adrenal gland. Kidneys: Status post left radical nephrectomy. No evidence of local recurrence. Unchanged right renal cyst. Lymph nodes: No lymphadenopathy. GI tract: No dilated bowel. Peritoneum: No ascites. Pelvic organs: No bladder mass seen.The prostate measures 5.7 cm transversely. Musculoskeletal: No destructive osseous lesion. Degenerative change in spine.    No evidence of tumor recurrence. No adenopathy identified. ACTIONABLE ITEMS/RECOMMENDATIONS: None. I personally reviewed these image(s) along with the resident's/fellow's interpretations, certify that if a procedure was performed I was physically present, and agree with the final report.    CT CHEST WITHOUT CONTRAST    Result Date: 6/10/2024  FULL RESULT: Examination: CT CHEST WO CONTRAST on 6/10/2024 3:49 PM. Clinical History: Malignant neoplasm of left kidney, except renal pelvis Diffuse large B-cell lymphoma, extranodal and solid organ sites Indication: Cancer staging or restaging Comparison: CT chest 3/11/2024; CT chest abdomen pelvis 1123 Technique: CT of the chest is performed without intravenous contrast. Findings: Left infusion port catheter terminates in the lower superior vena cava.  Lungs/Airways/Pleura: A small ground glass opacity in the right upper lobe is new (image 23, series 3). Scattered small lung nodules less than 5 mm are unchanged and nonspecific (annotated with arrows, series 3). Minimal subpleural fibrosis is seen in the medial lung bases. No pleural effusion. Neck/Mediastinum/Nodes/Heart: Surgical clips are seen in the right lower neck. No mediastinal, hilar or axillary lymph nodes by CT size criteria. The heart is normal in size with small physiologic pericardial fluid and multivessel coronary artery disease. Punctate aortic valve calcification (image 76, series 2). The esophagus is nondistended and normal in course. Upper abdomen: There is left nephrectomy and adrenalectomy. The right adrenal gland has normal morphology. No focal hepatic or splenic lesion on noncontrast study. The gallbladder is normal in size without radiopaque gallstone. Bones/Soft Tissues: Mild age-related degenerative changes of the spine and shoulders. Previous anterior cervical discectomy and fusion. No new bone lesion or acute bone fracture in the thorax.    1.  New small right upper lobe ground glass opacity is most likely inflammatory. Reassess on follow-up. 2.  No thoracic lymphadenopathy or metastatic disease. 3.  Scattered small lung nodules are unchanged and nonspecific. They can be followed. 4.  Multivessel coronary artery disease. ACTIONABLE ITEMS/RECOMMENDATIONS*: None. *An Actionable Finding is a finding that may be unrelated to the original reason for imaging but potentially actionable, meaning further investigation may be necessary. The Actionable Findings Vigilance Unit (AFVU) assists medical providers with responding to additional radiologic findings that are unexpected and potentially actionable.      @[facility]@    Assessment /Plan:   Complex 73-year-old with episodic nausea and vomiting and diarrhea, personal history of gastric lymphoma, upper endoscopy scheduled with further recommendations  to follow  Patient Active Problem List    Diagnosis Date Noted    Hyperkalemia, transcellular shifts 06/12/2024    Primary hypertension 06/12/2024    ASCVD (arteriosclerotic cardiovascular disease) 06/12/2024    Urinary retention 05/23/2024    Malignant neoplasm metastatic to both lungs 02/15/2024    Thrombocytopenia, unspecified 02/15/2024    Hypothyroid 04/18/2023    Clear cell carcinoma of kidney 01/17/2023    Type 2 diabetes mellitus without complication, without long-term current use of insulin 05/23/2022    Hypogonadism in male 05/23/2022    ANTWAN (obstructive sleep apnea) 05/23/2022        Thank you for allowing us to participate in this patient's care.

## 2024-06-19 NOTE — DISCHARGE INSTRUCTIONS
What to watch out for and call the Doctor's office    Abdominal pain apart from the gas gramps  Fever and chills and signs of infection  Bleeding  Nausea and vomiting  Chest pain   Back pain    Call the emergency room if you experience any of the following    Severe difficulty in breathing  Severe chest pain  Severe abdominal pain  High grade fever (more than 101 F) and chills  Bleeding of more than 1 tablespoon  Passage of black tarry stool  Persistent Vomiting or vomiting of blood

## 2024-06-19 NOTE — ANESTHESIA PREPROCEDURE EVALUATION
06/19/2024  Nick Son Jr is a 73 y.o., male.    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     I have reviewed the Nursing Notes. I have reviewed the NPO Status.   I have reviewed the Medications.     Review of Systems  Anesthesia Hx:               Denies Personal Hx of Anesthesia complications.                    Cardiovascular:     Hypertension                                        Pulmonary:        Sleep Apnea                Renal/:  Chronic Renal Disease (hx of clear cell carcinoma)                Endocrine:  Diabetes Hypothyroidism              Physical Exam  General: Well nourished, Cooperative and Alert    Airway:  Mallampati: II   Mouth Opening: Normal  TM Distance: Normal    Chest/Lungs:  Normal Respiratory Rate        Anesthesia Plan  Type of Anesthesia, risks & benefits discussed:    Anesthesia Type: Gen Natural Airway  Intra-op Monitoring Plan: Standard ASA Monitors  Post Op Pain Control Plan: IV/PO Opioids PRN  Induction:  IV  Informed Consent: Informed consent signed with the Patient and all parties understand the risks and agree with anesthesia plan.  All questions answered.   ASA Score: 3  Day of Surgery Review of History & Physical: H&P Update referred to the surgeon/provider.    Ready For Surgery From Anesthesia Perspective.     .

## 2024-06-19 NOTE — OP NOTE
EGD Report    Referring Physician: Elma    Date of procedure: 06/19/2024     Surgeon: Apolinar Ocasio    ASA:  3    Medications: Per anesthesia    Indication:  Episodic nausea vomiting and diarrhea, personal history of gastric lymphoma    Procedure: EGD biopsy    Description of the Procedure: The patient was brought back to the endoscopy suite where the risks, benefits, and alternatives of the procedure were described in detail. The patient was given the opportunity to ask questions and then signed informed consent. Patient was positioned in the left lateral decubitus position, continuous monitoring was initiated, and supplemental oxygen was provided via nasal cannula. Bite block was placed. Adequate sedation was achieved with the above mentioned medications as documented in chart and then titrated during the entire procedure. Under direct visualization the gastroscope was introduced through the oropharynx into the esophagus. The scope was advanced into the stomach and to the second portion of the duodenum. Scope was withdrawn and the mucosa was carefully examined. The entire gastric mucosa was examined, including the fundus with retroflexion. Air was evacuated from the stomach and the scope was withdrawn into the esophagus. The entire esophageal mucosa was examined. The procedure was completed. The patient tolerated the procedure well and was transferred to the recovery area in stable condition.     Estimated Blood Loss: minimal    Complications: none    Findings:  1. Normal esophagoscopy  2. Normal cardia and fundus  3. Some mild gastritis in the body with petechia and erythema but without active erosion or ulceration.  4.  Some scarring as previously seen in the antrum at previous site of gastric lymphoma, no active mucosal concern evident, fairly straightforward exit through a patent pylorus, biopsies obtained of the body and antrum for histopathology   5. Bulbar duodenitis  6. Normal duodenum in the 2nd  portion      Impression and Recommendations:   Some mild gastritis and scarring from previous gastric lymphoma, but little change from previous endoscopic findings of 2018 and 2022.  Duodenitis. Perhaps proton pump inhibition would be beneficial, and towards that end I will write for pantoprazole 40 mg daily.  He and the wife are fairly convinced that his issues are temporally related to his chemotherapy regimen, and he does well enough when he takes a vacation periodically.  He is overdue for his surveillance colonoscopy, but I suspect we ought to pursue that down the line when most convenient for them    Apolinar Ocasio

## 2024-06-20 VITALS
OXYGEN SATURATION: 99 % | HEART RATE: 64 BPM | DIASTOLIC BLOOD PRESSURE: 79 MMHG | BODY MASS INDEX: 27.66 KG/M2 | RESPIRATION RATE: 17 BRPM | SYSTOLIC BLOOD PRESSURE: 116 MMHG | HEIGHT: 64 IN | TEMPERATURE: 97 F | WEIGHT: 162 LBS

## 2024-06-20 LAB — PSYCHE PATHOLOGY RESULT: NORMAL

## 2024-06-21 NOTE — ANESTHESIA POSTPROCEDURE EVALUATION
Anesthesia Post Evaluation    Patient: Nick Son Jr    Procedure(s) Performed: Procedure(s) (LRB):  EGD (N/A)    Final Anesthesia Type: general      Patient location during evaluation: PACU  Patient participation: Yes- Able to Participate  Level of consciousness: awake and alert  Post-procedure vital signs: reviewed and stable  Pain management: adequate  Airway patency: patent    PONV status at discharge: No PONV  Anesthetic complications: no      Respiratory status: unassisted  Hydration status: euvolemic  Follow-up not needed.              Vitals Value Taken Time   /79 06/19/24 1009   Temp 36.2 °C (97.2 °F) 06/19/24 0943   Pulse 64 06/19/24 1009   Resp 17 06/19/24 1009   SpO2 99 % 06/19/24 1009         No case tracking events are documented in the log.      Pain/Gonsalo Score: No data recorded

## 2024-07-15 ENCOUNTER — PATIENT MESSAGE (OUTPATIENT)
Dept: INTERNAL MEDICINE | Facility: CLINIC | Age: 74
End: 2024-07-15
Payer: MEDICARE

## 2024-07-29 ENCOUNTER — LAB VISIT (OUTPATIENT)
Dept: LAB | Facility: HOSPITAL | Age: 74
End: 2024-07-29
Attending: INTERNAL MEDICINE
Payer: MEDICARE

## 2024-07-29 DIAGNOSIS — E87.5 HYPERKALEMIA, TRANSCELLULAR SHIFTS: ICD-10-CM

## 2024-07-29 DIAGNOSIS — E08.44 DIABETES MELLITUS DUE TO UNDERLYING CONDITION WITH DIABETIC AMYOTROPHY, WITHOUT LONG-TERM CURRENT USE OF INSULIN: ICD-10-CM

## 2024-07-29 DIAGNOSIS — R33.9 URINARY RETENTION: ICD-10-CM

## 2024-07-29 DIAGNOSIS — I10 PRIMARY HYPERTENSION: ICD-10-CM

## 2024-07-29 DIAGNOSIS — I25.10 ASCVD (ARTERIOSCLEROTIC CARDIOVASCULAR DISEASE): ICD-10-CM

## 2024-07-29 LAB
ALBUMIN SERPL-MCNC: 3.6 G/DL (ref 3.4–4.8)
ALBUMIN/GLOB SERPL: 1.2 RATIO (ref 1.1–2)
ALP SERPL-CCNC: 44 UNIT/L (ref 40–150)
ALT SERPL-CCNC: 18 UNIT/L (ref 0–55)
ANION GAP SERPL CALC-SCNC: 6 MEQ/L
AST SERPL-CCNC: 14 UNIT/L (ref 5–34)
BILIRUB SERPL-MCNC: 0.4 MG/DL
BUN SERPL-MCNC: 28.2 MG/DL (ref 8.4–25.7)
CALCIUM SERPL-MCNC: 9.3 MG/DL (ref 8.8–10)
CHLORIDE SERPL-SCNC: 105 MMOL/L (ref 98–107)
CO2 SERPL-SCNC: 29 MMOL/L (ref 23–31)
CREAT SERPL-MCNC: 1.19 MG/DL (ref 0.73–1.18)
CREAT/UREA NIT SERPL: 24
EST. AVERAGE GLUCOSE BLD GHB EST-MCNC: 125.5 MG/DL
GFR SERPLBLD CREATININE-BSD FMLA CKD-EPI: >60 ML/MIN/1.73/M2
GLOBULIN SER-MCNC: 3 GM/DL (ref 2.4–3.5)
GLUCOSE SERPL-MCNC: 136 MG/DL (ref 82–115)
HBA1C MFR BLD: 6 %
POTASSIUM SERPL-SCNC: 4.7 MMOL/L (ref 3.5–5.1)
PROT SERPL-MCNC: 6.6 GM/DL (ref 5.8–7.6)
SODIUM SERPL-SCNC: 140 MMOL/L (ref 136–145)

## 2024-07-29 PROCEDURE — 36415 COLL VENOUS BLD VENIPUNCTURE: CPT

## 2024-07-29 PROCEDURE — 80053 COMPREHEN METABOLIC PANEL: CPT

## 2024-07-29 PROCEDURE — 83036 HEMOGLOBIN GLYCOSYLATED A1C: CPT

## 2024-07-31 ENCOUNTER — OFFICE VISIT (OUTPATIENT)
Dept: INTERNAL MEDICINE | Facility: CLINIC | Age: 74
End: 2024-07-31
Payer: MEDICARE

## 2024-07-31 VITALS
HEART RATE: 66 BPM | DIASTOLIC BLOOD PRESSURE: 86 MMHG | WEIGHT: 166 LBS | RESPIRATION RATE: 16 BRPM | OXYGEN SATURATION: 99 % | BODY MASS INDEX: 28.34 KG/M2 | TEMPERATURE: 98 F | SYSTOLIC BLOOD PRESSURE: 136 MMHG | HEIGHT: 64 IN

## 2024-07-31 DIAGNOSIS — R35.0 BENIGN PROSTATIC HYPERPLASIA WITH URINARY FREQUENCY: ICD-10-CM

## 2024-07-31 DIAGNOSIS — N40.1 BENIGN PROSTATIC HYPERPLASIA WITH URINARY FREQUENCY: ICD-10-CM

## 2024-07-31 DIAGNOSIS — E11.9 TYPE 2 DIABETES MELLITUS WITHOUT COMPLICATION, WITHOUT LONG-TERM CURRENT USE OF INSULIN: Primary | ICD-10-CM

## 2024-07-31 DIAGNOSIS — R33.9 URINARY RETENTION: ICD-10-CM

## 2024-07-31 PROCEDURE — 99213 OFFICE O/P EST LOW 20 MIN: CPT | Mod: ,,, | Performed by: INTERNAL MEDICINE

## 2024-07-31 RX ORDER — FINASTERIDE 5 MG/1
5 TABLET, FILM COATED ORAL DAILY
Qty: 90 TABLET | Refills: 3 | Status: SHIPPED | OUTPATIENT
Start: 2024-07-31 | End: 2025-07-31

## 2024-07-31 NOTE — PROGRESS NOTES
Internal Medicine    Patient ID: [id number]     Chief Complaint: Urinary Retention      HPI:     Nick Son Jr is a 73 y.o. male here today for a follow up.     He is also on Keytruda  MRI of the pelvis with and without contrast this week revealed nothing except a enlarged prostate at 7.5 cm there was no description of anything concerning about the prostate no calcifications, etc..  CT without contrast was negative  Has ongoing issues with urination and urinary frequency  A1c at 6; fasting sugars in the 130s.  His weight is up 6 lb and he is not happy about that.  He followed up with Urology and was told that he was emptying his bladder completely but still has a lot of urinary frequency.  No improvement with the addition of the Cardura  Past Medical History:   Diagnosis Date    Cancer     Diabetes mellitus, type 2     Personal history of colonic polyps         Past Surgical History:   Procedure Laterality Date    APPENDECTOMY      COLONOSCOPY  05/10/2017    EGD, WITH CLOSED BIOPSY N/A 6/19/2024    Procedure: EGD;  Surgeon: Apolinar Ocasio MD;  Location: [facility] ENDOSCOPY;  Service: Gastroenterology;  Laterality: N/A;    FRACTURE SURGERY  Age 11    Broke right arm    KIDNEY TRANSPLANT  2017    Right kidney removed.   No transplant    SPINE SURGERY  has had 5 surgeries        Social History     Tobacco Use    Smoking status: Never    Smokeless tobacco: Never   Substance and Sexual Activity    Alcohol use: Yes     Comment: Occassioanlly    Drug use: Never    Sexual activity: Yes     Partners: Female     Birth control/protection: None        Current Outpatient Medications   Medication Instructions    cetirizine (ZYRTEC) 10 mg, Oral, Daily    cholecalciferol (vitamin D3) (VITAMIN D3) 400 Units, Oral, Daily    colestipoL (COLESTID) 1 g, Oral, 2 times daily    cyanocobalamin (VITAMIN B-12) 100 mcg, Oral, Daily    diphenoxylate-atropine 2.5-0.025 mg (LOMOTIL) 2.5-0.025 mg per tablet 1-2 tablets, Oral    famotidine  (PEPCID) 10 mg, Oral, Daily    finasteride (PROSCAR) 5 mg, Oral, Daily    flash glucose sensor (FREESTYLE DERICK 14 DAY SENSOR) Kit 1 each, Misc.(Non-Drug; Combo Route), Every 14 days    flash glucose sensor (FREESTYLE DERICK 2 SENSOR) Kit 1 each, Misc.(Non-Drug; Combo Route), Every 14 days    fluorouraciL (EFUDEX) 5 % cream Topical (Top), 2 times daily    fluticasone propionate (FLONASE) 50 mcg/actuation nasal spray 1 spray, Each Nostril, Daily    glipiZIDE 5 mg, Oral, With breakfast    lactulose (CHRONULAC) 10 gram/15 mL solution SMARTSI Milliliter(s) By Mouth Twice Daily PRN    LENVIMA 8 mg, Oral, Daily, Alternating 8 mg one day then 4 mg the next day    levothyroxine (SYNTHROID) 100 MCG tablet 1 tablet, Oral, Daily    multivitamin with folic acid 400 mcg Tab 1 tablet, Oral, Daily    niacinamide 500 mg, Oral, Nightly    ondansetron (ZOFRAN) 8 mg, Oral, Every 8 hours PRN    pantoprazole (PROTONIX) 40 mg, Oral, Daily    pembrolizumab (KEYTRUDA) 25 mg, Intravenous, Every 21 days    pravastatin (PRAVACHOL) 40 mg, Oral, Daily    tamsulosin (FLOMAX) 0.4 mg, Oral, Daily       Review of patient's allergies indicates:   Allergen Reactions    Meperidine Hives and Itching    Morphine Hives and Itching    Propoxyphene n-acetaminophen Hives and Itching    Iodinated contrast media Itching and Rash     22 - C/o delayed reaction following day with rash & itching to L knee to feet.        Patient Care Team:  Ever Quezada MD as PCP - General (Internal Medicine)  Mohit Garcia OD (Ophthalmology)  Anders Alvarez MD as Consulting Physician (Cardiology)  Oncology, [facility] - Rad (Radiation Oncology)     Subjective:     Review of Systems    12 point review of systems conducted, negative except as stated in the history of present illness. See HPI for details.    Objective:     Visit Vitals  /86 (BP Location: Left arm, Patient Position: Sitting, BP Method: Medium (Manual))   Pulse 66   Temp 97.9  "°F (36.6 °C) (Temporal)   Resp 16   Ht 5' 4" (1.626 m)   Wt 75.3 kg (166 lb)   SpO2 99%   BMI 28.49 kg/m²       Physical Exam  Constitutional:       Appearance: Normal appearance.   HENT:      Head: Normocephalic and atraumatic.   Eyes:      Extraocular Movements: Extraocular movements intact.      Pupils: Pupils are equal, round, and reactive to light.   Cardiovascular:      Rate and Rhythm: Normal rate and regular rhythm.   Pulmonary:      Effort: Pulmonary effort is normal.      Breath sounds: Normal breath sounds.   Abdominal:      General: Bowel sounds are normal.      Palpations: Abdomen is soft.   Musculoskeletal:         General: Normal range of motion.   Skin:     General: Skin is warm and dry.   Neurological:      General: No focal deficit present.      Mental Status: He is alert.   Psychiatric:         Mood and Affect: Mood normal.         Labs Reviewed:     Chemistry:  Lab Results   Component Value Date     07/29/2024    K 4.7 07/29/2024    BUN 28.2 (H) 07/29/2024    CREATININE 1.19 (H) 07/29/2024    EGFRNORACEVR >60 07/29/2024    GLUCOSE 136 (H) 07/29/2024    CALCIUM 9.3 07/29/2024    ALKPHOS 44 07/29/2024    LABPROT 6.6 07/29/2024    ALBUMIN 3.6 07/29/2024    BILIDIR 0.1 11/29/2021    IBILI 0.20 11/29/2021    AST 14 07/29/2024    ALT 18 07/29/2024    MG 2.2 10/02/2017    PHOS 3.1 10/02/2017    [id number] 30.1 02/12/2024    TSH 2.383 07/10/2024    PSA 3.04 02/12/2024        Lab Results   Component Value Date    HGBA1C 6.0 07/29/2024        Hematology:  Lab Results   Component Value Date    WBC 5.16 02/12/2024    HGB 14.7 02/12/2024    HCT 43.5 02/12/2024     02/12/2024       Lipid Panel:  Lab Results   Component Value Date    CHOL 166 05/20/2024    HDL 34 (L) 05/20/2024    LDL 67.00 05/20/2024    TRIG 324 (H) 05/20/2024    TOTALCHOLEST 5 05/20/2024        Urine:  Lab Results   Component Value Date    APPEARANCEUA Clear 06/12/2024    SGUA 1.009 06/12/2024    PROTEINUA Negative 06/12/2024 "    KETONESUA Negative 06/12/2024    LEUKOCYTESUR Negative 06/12/2024    RBCUA 0-5 06/12/2024    WBCUA 0-5 06/12/2024    BACTERIA None Seen 06/12/2024    SQEPUA None Seen 06/12/2024    CREATRANDUR 21.1 (L) 05/20/2024        Assessment:       ICD-10-CM ICD-9-CM   1. Type 2 diabetes mellitus without complication, without long-term current use of insulin  E11.9 250.00   2. Benign prostatic hyperplasia with urinary frequency  N40.1 600.01    R35.0 788.41   3. Urinary retention  R33.9 788.20        Plan:     1. Type 2 diabetes mellitus without complication, without long-term current use of insulin  Assessment & Plan:  Patient's FSGs are controlled on current medication regimen.  Last A1c reviewed-   Lab Results   Component Value Date    HGBA1C 6.0 07/29/2024     G LP 1 intolerance secondary to nausea, vomiting and constipation; he actually inquired about going back on Rybelsus I told him absolutely not  Sugars are well controlled for his age; on glipizide only  Of course he is not happy that he has gained some weight but I think he looks amazing! and his weight is only up 6 lb   WE will continue the course for now he follows up with his oncologist tomorrow.      2. Benign prostatic hyperplasia with urinary frequency    3. Urinary retention  Assessment & Plan:    DC doxazosin secondary to ineffectiveness trial of finasteride  Monitor blood pressure      Other orders  -     finasteride (PROSCAR) 5 mg tablet; Take 1 tablet (5 mg total) by mouth once daily.  Dispense: 90 tablet; Refill: 3         Follow up in about 6 months (around 1/31/2025) for [facility]yle[facility], with labs prior to visit, DIABETIC REVISIT. In addition to their scheduled follow up, the patient has also been instructed to follow up on as needed basis.     Future Appointments   Date Time Provider Department Center   10/31/2024  2:40 PM Ever Quezada MD [facility] [id number]        Ever Quezada MD

## 2024-07-31 NOTE — ASSESSMENT & PLAN NOTE
Patient's FSGs are controlled on current medication regimen.  Last A1c reviewed-   Lab Results   Component Value Date    HGBA1C 6.0 07/29/2024     G LP 1 intolerance secondary to nausea, vomiting and constipation; he actually inquired about going back on Rybelsus I told him absolutely not  Sugars are well controlled for his age; on glipizide only  Of course he is not happy that he has gained some weight but I think he looks amazing! and his weight is only up 6 lb   WE will continue the course for now he follows up with his oncologist tomorrow.

## 2024-08-28 ENCOUNTER — PATIENT MESSAGE (OUTPATIENT)
Dept: INTERNAL MEDICINE | Facility: CLINIC | Age: 74
End: 2024-08-28
Payer: MEDICARE

## 2024-10-14 DIAGNOSIS — E11.9 TYPE 2 DIABETES MELLITUS WITHOUT COMPLICATION, WITHOUT LONG-TERM CURRENT USE OF INSULIN: Primary | ICD-10-CM

## 2024-10-16 ENCOUNTER — TELEPHONE (OUTPATIENT)
Dept: INTERNAL MEDICINE | Facility: CLINIC | Age: 74
End: 2024-10-16
Payer: MEDICARE

## 2024-10-16 NOTE — TELEPHONE ENCOUNTER
----- Message from Med Assistant Wells sent at 10/15/2024 10:10 AM CDT -----  Regarding: PV 10/31/24 @ 2:40 Dr. Moon  1. Are there any outstanding tasks in the patient's chart? Yes, fasting labs    2. Is there any documentation in the chart? No    3.Has patient been seen in an ER, Urgent care clinic, or been admitted since last visit?  If yes, When, where, and why    4. Has patient seen any other healthcare providers since last visit?  If yes, when, where, and why    5. Has patient had any bloodwork or XR done since last visit?    6. Is patient signed up for patient portal?

## 2024-10-30 ENCOUNTER — LAB VISIT (OUTPATIENT)
Dept: LAB | Facility: HOSPITAL | Age: 74
End: 2024-10-30
Attending: INTERNAL MEDICINE
Payer: MEDICARE

## 2024-10-30 DIAGNOSIS — E11.9 TYPE 2 DIABETES MELLITUS WITHOUT COMPLICATION, WITHOUT LONG-TERM CURRENT USE OF INSULIN: ICD-10-CM

## 2024-10-30 LAB
ALBUMIN SERPL-MCNC: 3.8 G/DL (ref 3.4–4.8)
ALBUMIN/GLOB SERPL: 1.3 RATIO (ref 1.1–2)
ALP SERPL-CCNC: 53 UNIT/L (ref 40–150)
ALT SERPL-CCNC: 20 UNIT/L (ref 0–55)
ANION GAP SERPL CALC-SCNC: 8 MEQ/L
AST SERPL-CCNC: 23 UNIT/L (ref 5–34)
BACTERIA #/AREA URNS AUTO: NORMAL /HPF
BILIRUB SERPL-MCNC: 0.5 MG/DL
BILIRUB UR QL STRIP.AUTO: NEGATIVE
BUN SERPL-MCNC: 23.1 MG/DL (ref 8.4–25.7)
CALCIUM SERPL-MCNC: 9.2 MG/DL (ref 8.8–10)
CHLORIDE SERPL-SCNC: 102 MMOL/L (ref 98–107)
CHOLEST SERPL-MCNC: 248 MG/DL
CHOLEST/HDLC SERPL: 6 {RATIO} (ref 0–5)
CLARITY UR: CLEAR
CO2 SERPL-SCNC: 28 MMOL/L (ref 23–31)
COLOR UR AUTO: NORMAL
CREAT SERPL-MCNC: 1.37 MG/DL (ref 0.72–1.25)
CREAT UR-MCNC: 64.8 MG/DL (ref 63–166)
CREAT/UREA NIT SERPL: 17
EST. AVERAGE GLUCOSE BLD GHB EST-MCNC: 131.2 MG/DL
GFR SERPLBLD CREATININE-BSD FMLA CKD-EPI: 54 ML/MIN/1.73/M2
GLOBULIN SER-MCNC: 3 GM/DL (ref 2.4–3.5)
GLUCOSE SERPL-MCNC: 146 MG/DL (ref 82–115)
GLUCOSE UR QL STRIP: NORMAL
HBA1C MFR BLD: 6.2 %
HDLC SERPL-MCNC: 44 MG/DL (ref 35–60)
HGB UR QL STRIP: NEGATIVE
KETONES UR QL STRIP: NEGATIVE
LDLC SERPL CALC-MCNC: 149 MG/DL (ref 50–140)
LEUKOCYTE ESTERASE UR QL STRIP: NEGATIVE
MICROALBUMIN UR-MCNC: <5 UG/ML
MICROALBUMIN/CREAT RATIO PNL UR: NORMAL
NITRITE UR QL STRIP: NEGATIVE
PH UR STRIP: 7 [PH]
POTASSIUM SERPL-SCNC: 4.8 MMOL/L (ref 3.5–5.1)
PROT SERPL-MCNC: 6.8 GM/DL (ref 5.8–7.6)
PROT UR QL STRIP: NEGATIVE
RBC #/AREA URNS AUTO: NORMAL /HPF
SODIUM SERPL-SCNC: 138 MMOL/L (ref 136–145)
SP GR UR STRIP.AUTO: 1.01 (ref 1–1.03)
SQUAMOUS #/AREA URNS LPF: NORMAL /HPF
TRIGL SERPL-MCNC: 277 MG/DL (ref 34–140)
UROBILINOGEN UR STRIP-ACNC: NORMAL
VLDLC SERPL CALC-MCNC: 55 MG/DL
WBC #/AREA URNS AUTO: NORMAL /HPF

## 2024-10-30 PROCEDURE — 83036 HEMOGLOBIN GLYCOSYLATED A1C: CPT

## 2024-10-30 PROCEDURE — 81001 URINALYSIS AUTO W/SCOPE: CPT

## 2024-10-30 PROCEDURE — 80061 LIPID PANEL: CPT

## 2024-10-30 PROCEDURE — 80053 COMPREHEN METABOLIC PANEL: CPT

## 2024-10-30 PROCEDURE — 36415 COLL VENOUS BLD VENIPUNCTURE: CPT

## 2024-10-30 PROCEDURE — 82043 UR ALBUMIN QUANTITATIVE: CPT

## 2024-10-31 ENCOUNTER — OFFICE VISIT (OUTPATIENT)
Dept: INTERNAL MEDICINE | Facility: CLINIC | Age: 74
End: 2024-10-31
Payer: MEDICARE

## 2024-10-31 VITALS
BODY MASS INDEX: 29.53 KG/M2 | SYSTOLIC BLOOD PRESSURE: 120 MMHG | WEIGHT: 173 LBS | TEMPERATURE: 98 F | OXYGEN SATURATION: 99 % | HEART RATE: 92 BPM | HEIGHT: 64 IN | DIASTOLIC BLOOD PRESSURE: 80 MMHG | RESPIRATION RATE: 16 BRPM

## 2024-10-31 DIAGNOSIS — J06.9 UPPER RESPIRATORY TRACT INFECTION, UNSPECIFIED TYPE: Primary | ICD-10-CM

## 2024-10-31 LAB
FLUAV AG UPPER RESP QL IA.RAPID: NOT DETECTED
FLUBV AG UPPER RESP QL IA.RAPID: NOT DETECTED
SARS-COV-2 RNA RESP QL NAA+PROBE: DETECTED

## 2024-10-31 PROCEDURE — 99214 OFFICE O/P EST MOD 30 MIN: CPT | Mod: ,,, | Performed by: INTERNAL MEDICINE

## 2024-10-31 PROCEDURE — 0240U COVID/FLU A&B PCR: CPT | Performed by: INTERNAL MEDICINE

## 2024-10-31 RX ORDER — PROPRANOLOL HYDROCHLORIDE 10 MG/1
10 TABLET ORAL 2 TIMES DAILY
Qty: 60 TABLET | Refills: 11 | Status: SHIPPED | OUTPATIENT
Start: 2024-10-31 | End: 2025-10-31

## 2024-10-31 RX ORDER — GUAIFENESIN 100 MG/5ML
200 SOLUTION ORAL 3 TIMES DAILY PRN
Qty: 273 ML | Refills: 0 | Status: SHIPPED | OUTPATIENT
Start: 2024-10-31 | End: 2024-11-10

## 2024-10-31 RX ORDER — LACTULOSE 10 G/15ML
20 SOLUTION ORAL; RECTAL NIGHTLY PRN
Qty: 473 ML | Refills: 1 | Status: SHIPPED | OUTPATIENT
Start: 2024-10-31

## 2024-10-31 RX ORDER — ONDANSETRON HYDROCHLORIDE 8 MG/1
8 TABLET, FILM COATED ORAL EVERY 8 HOURS PRN
Qty: 30 TABLET | Refills: 1 | Status: SHIPPED | OUTPATIENT
Start: 2024-10-31

## 2024-10-31 RX ORDER — MONTELUKAST SODIUM 4 MG/1
1 TABLET, CHEWABLE ORAL 2 TIMES DAILY
Qty: 180 TABLET | Refills: 3 | Status: SHIPPED | OUTPATIENT
Start: 2024-10-31 | End: 2025-10-31

## 2024-10-31 RX ORDER — LENVATINIB 4 MG/1
8 CAPSULE ORAL DAILY
Status: CANCELLED | OUTPATIENT
Start: 2024-10-31

## 2024-11-01 RX ORDER — GUAIFENESIN 100 MG/5ML
200 SOLUTION ORAL 3 TIMES DAILY PRN
Qty: 273 ML | Refills: 0 | OUTPATIENT
Start: 2024-11-01 | End: 2024-11-11

## 2024-11-01 RX ORDER — ONDANSETRON HYDROCHLORIDE 8 MG/1
8 TABLET, FILM COATED ORAL EVERY 8 HOURS PRN
Qty: 30 TABLET | Refills: 1 | OUTPATIENT
Start: 2024-11-01

## 2024-11-06 RX ORDER — GUAIFENESIN 100 MG/5ML
200 SOLUTION ORAL 3 TIMES DAILY PRN
Qty: 273 ML | Refills: 0 | Status: CANCELLED | OUTPATIENT
Start: 2024-11-06 | End: 2024-11-16

## 2024-11-06 NOTE — TELEPHONE ENCOUNTER
----- Message from Brigitte sent at 11/6/2024 12:17 PM CST -----  .Type:  RX Refill Request    Who Called: pt  Refill or New Rx:refill  RX Name and Strength:guaiFENesin 100 mg/5 ml (ROBITUSSIN)   How is the patient currently taking it? (ex. 1XDay):3/day  Is this a 30 day or 90 day RX:2/ WEEK SUPPLY  Preferred Pharmacy with phone number:[facility]  Local or Mail Order:Local  Ordering Provider:Sarai  Would the patient rather a call back or a response via [facility]?   Best Call Back Number:[phone]  Additional Information: guaiFENesin 100 mg/5 ml (ROBITUSSIN)

## 2024-11-24 ENCOUNTER — OFFICE VISIT (OUTPATIENT)
Dept: URGENT CARE | Facility: CLINIC | Age: 74
End: 2024-11-24
Payer: MEDICARE

## 2024-11-24 VITALS
WEIGHT: 173 LBS | DIASTOLIC BLOOD PRESSURE: 93 MMHG | BODY MASS INDEX: 29.53 KG/M2 | TEMPERATURE: 98 F | OXYGEN SATURATION: 98 % | RESPIRATION RATE: 18 BRPM | SYSTOLIC BLOOD PRESSURE: 164 MMHG | HEIGHT: 64 IN | HEART RATE: 83 BPM

## 2024-11-24 DIAGNOSIS — R05.9 COUGH, UNSPECIFIED TYPE: Primary | ICD-10-CM

## 2024-11-24 PROCEDURE — 99213 OFFICE O/P EST LOW 20 MIN: CPT | Mod: 25,,, | Performed by: FAMILY MEDICINE

## 2024-11-24 PROCEDURE — 96372 THER/PROPH/DIAG INJ SC/IM: CPT | Mod: ,,, | Performed by: FAMILY MEDICINE

## 2024-11-24 RX ORDER — BENZONATATE 200 MG/1
200 CAPSULE ORAL 3 TIMES DAILY PRN
Qty: 15 CAPSULE | Refills: 0 | Status: SHIPPED | OUTPATIENT
Start: 2024-11-24 | End: 2024-11-29

## 2024-11-24 RX ORDER — BETAMETHASONE SODIUM PHOSPHATE AND BETAMETHASONE ACETATE 3; 3 MG/ML; MG/ML
6 INJECTION, SUSPENSION INTRA-ARTICULAR; INTRALESIONAL; INTRAMUSCULAR; SOFT TISSUE
Status: COMPLETED | OUTPATIENT
Start: 2024-11-24 | End: 2024-11-24

## 2024-11-24 RX ORDER — AMOXICILLIN AND CLAVULANATE POTASSIUM 500; 125 MG/1; MG/1
1 TABLET, FILM COATED ORAL 3 TIMES DAILY
Qty: 21 TABLET | Refills: 0 | Status: SHIPPED | OUTPATIENT
Start: 2024-11-24 | End: 2024-12-01

## 2024-11-24 RX ADMIN — BETAMETHASONE SODIUM PHOSPHATE AND BETAMETHASONE ACETATE 6 MG: 3; 3 INJECTION, SUSPENSION INTRA-ARTICULAR; INTRALESIONAL; INTRAMUSCULAR; SOFT TISSUE at 04:11

## 2024-11-24 NOTE — PROGRESS NOTES
"Subjective:      Patient ID: Nick Son Jr is a 74 y.o. male.    Vitals:  height is 5' 4" (1.626 m) and weight is 78.5 kg (173 lb). His temperature is 97.6 °F (36.4 °C). His blood pressure is 164/93 (abnormal) and his pulse is 83. His respiration is 18 and oxygen saturation is 98%.     Chief Complaint: Cough     Patient is a 74 y.o. male who presents to urgent care with complaints of cough,  x3 weeks. Had covid and is recovering. Alleviating factors include robitussin and mucinex with mild amount of relief. .      ROS :  Constitutional : _No fatigue, No Fever  HEENT : _No sore throat, no ear pain, no sinus congestion  Neck : No pain, range of motion present  Respiratory : _Coughing, mucus production  Cardiovascular : _No chest pain, no palpitations  Gastrointestinal : _No vomiting or diarrhea. No abdominal pain  Integumentary : _No skin rash   Neurologic_No headache, No dizziness     [place]:  74-year-old male known for multiple chronic medical conditions present to clinic with concerns of ongoing cough for 3 weeks.  No shortness a breath, no chest pain.  Over-the-counter medications some help.  No fever in the clinic.  States tested positive for COVID-19 3 weeks ago.  Reviewed the chart, positive COVID-19, October 31, 2024.  For which patient was seen by his primary MD  No sore throat, no difficulty swallowing.  Requesting for chest XR  Requesting for cortisone injection as it helped in the past.  Last hemoglobin A1c 6.2 10/30/2024  Objective:     Physical Exam  General : Alert and Oriented, No apparent distress, afebrile, coughing sounds wet and bronchial  Neck - supple  HENT : Oropharynx no redness or swelling.  Bilateral TMs intact mild fluid no redness.   Respiratory : Bilateral equal breath sounds, nonlabored respirations, bilateral coarse breath sounds, no wheezing  Cardiovascular : Rate, rhythm regular, normal volume pulse, no murmur  Gastrointestinal: Full abdomen, soft, nontender to palpate  Integumentary " : Warm, Dry and no rash    Assessment:     1. Cough, unspecified type      Plan:   Reviewed the x-ray, no concerns of pneumonia.  Radiology final results shared  Discussed in detail on post viral secondary infections.  Monitor the symptoms closely.  Adequate hydration.  Coricidin HBP for cough and cold as needed  Celestone IM today as anti inflammation for symptom relief, risk and benefits discussed including transient elevation in blood sugars  Tessalon Perles for coughing as needed.  Augmentin with food and milk to avoid gastric symptoms and probiotics as needed  ER precautions with any acute change in symptoms  Call or return to clinic for any questions  Follow up with primary MD as needed    Cough, unspecified type  -     XR CHEST PA AND LATERAL; Future; Expected date: 11/24/2024  -     amoxicillin-clavulanate 500-125mg (AUGMENTIN) 500-125 mg Tab; Take 1 tablet (500 mg total) by mouth 3 (three) times daily. for 7 days  Dispense: 21 tablet; Refill: 0  -     benzonatate (TESSALON) 200 MG capsule; Take 1 capsule (200 mg total) by mouth 3 (three) times daily as needed for Cough.  Dispense: 15 capsule; Refill: 0  -     betamethasone acetate-betamethasone sodium phosphate injection 6 mg                     Dany Walls(Attending)

## 2024-11-24 NOTE — PATIENT INSTRUCTIONS
Reviewed the x-ray, no concerns of pneumonia.  Radiology final results shared  Discussed in detail on post viral secondary infections.  Monitor the symptoms closely.  Adequate hydration.  Coricidin HBP for cough and cold as needed  Celestone IM today as anti inflammation for symptom relief, risk and benefits discussed including transient elevation in blood sugars  Tessalon Perles for coughing as needed.  Augmentin with food and milk to avoid gastric symptoms and probiotics as needed  ER precautions with any acute change in symptoms  Call or return to clinic for any questions  Follow up with primary MD as needed

## 2024-12-09 ENCOUNTER — TELEPHONE (OUTPATIENT)
Dept: INTERNAL MEDICINE | Facility: CLINIC | Age: 74
End: 2024-12-09
Payer: MEDICARE

## 2025-01-13 ENCOUNTER — PATIENT MESSAGE (OUTPATIENT)
Dept: INTERNAL MEDICINE | Facility: CLINIC | Age: 75
End: 2025-01-13
Payer: MEDICARE

## 2025-01-14 ENCOUNTER — OFFICE VISIT (OUTPATIENT)
Dept: INTERNAL MEDICINE | Facility: CLINIC | Age: 75
End: 2025-01-14
Payer: MEDICARE

## 2025-01-14 VITALS
SYSTOLIC BLOOD PRESSURE: 124 MMHG | WEIGHT: 151 LBS | BODY MASS INDEX: 25.78 KG/M2 | OXYGEN SATURATION: 99 % | HEIGHT: 64 IN | DIASTOLIC BLOOD PRESSURE: 76 MMHG | HEART RATE: 78 BPM | TEMPERATURE: 98 F | RESPIRATION RATE: 16 BRPM

## 2025-01-14 DIAGNOSIS — N18.2 TYPE 2 DIABETES MELLITUS WITH STAGE 2 CHRONIC KIDNEY DISEASE, WITHOUT LONG-TERM CURRENT USE OF INSULIN: ICD-10-CM

## 2025-01-14 DIAGNOSIS — E11.22 TYPE 2 DIABETES MELLITUS WITH STAGE 2 CHRONIC KIDNEY DISEASE, WITHOUT LONG-TERM CURRENT USE OF INSULIN: ICD-10-CM

## 2025-01-14 PROCEDURE — 99215 OFFICE O/P EST HI 40 MIN: CPT | Mod: ,,, | Performed by: INTERNAL MEDICINE

## 2025-01-14 RX ORDER — TRAZODONE HYDROCHLORIDE 50 MG/1
50 TABLET ORAL
COMMUNITY
Start: 2025-01-10

## 2025-01-14 RX ORDER — VITAMIN E MIXED 400 UNIT
2 CAPSULE ORAL
COMMUNITY
End: 2025-01-14

## 2025-01-14 RX ORDER — TRAMADOL HYDROCHLORIDE 50 MG/1
50 TABLET ORAL
COMMUNITY
Start: 2025-01-10

## 2025-01-14 RX ORDER — VALACYCLOVIR HYDROCHLORIDE 500 MG/1
500 TABLET, FILM COATED ORAL DAILY
COMMUNITY

## 2025-01-14 RX ORDER — ASPIRIN 81 MG/1
81 TABLET ORAL DAILY
COMMUNITY

## 2025-01-14 RX ORDER — PEN NEEDLE, DIABETIC 30 GX3/16"
NEEDLE, DISPOSABLE MISCELLANEOUS
COMMUNITY
Start: 2024-12-19

## 2025-01-14 RX ORDER — GLIPIZIDE 5 MG/1
5-10 TABLET ORAL
COMMUNITY
Start: 2025-01-14

## 2025-01-14 RX ORDER — UBIDECARENONE 30 MG
1 CAPSULE ORAL EVERY MORNING
COMMUNITY

## 2025-01-14 RX ORDER — NITROGLYCERIN 0.3 MG/1
0.3 TABLET SUBLINGUAL EVERY 5 MIN PRN
COMMUNITY
Start: 2024-12-10

## 2025-01-14 RX ORDER — ENTECAVIR 0.5 MG/1
0.5 TABLET, FILM COATED ORAL DAILY
COMMUNITY

## 2025-01-14 RX ORDER — METOPROLOL TARTRATE 25 MG/1
25 TABLET, FILM COATED ORAL
COMMUNITY
Start: 2025-01-10

## 2025-01-14 RX ORDER — BUTALB/ACETAMINOPHEN/CAFFEINE 50-325-40
1 TABLET ORAL EVERY MORNING
COMMUNITY

## 2025-01-14 RX ORDER — INSULIN GLARGINE 100 [IU]/ML
15 INJECTION, SOLUTION SUBCUTANEOUS
COMMUNITY
Start: 2025-01-10 | End: 2025-01-14

## 2025-01-14 NOTE — ASSESSMENT & PLAN NOTE
Creatine stable for now. according to latest data. Based on current GFR, CKD stage is stage 3 - GFR 30-59.   Avoid nephrotoxic medications and procedures.    Patient has completed his course of steroids in his no longer on basal insulin or sliding scale fasting sugar this morning 130, CGM data reviewed and glucose throughout the day is actually been a little bit lower than I would like he had to glipizide pills this morning.  Decrease Glipizide to  5 mg daily;  can increase to 5 mg b.i.d. if postprandials greater than 200 2 hours after eating.  RTC 8 weeks, sooner if needed  Would not use glipizide with insulin in the future given high risk of hypoglycemia

## 2025-01-14 NOTE — PROGRESS NOTES
Internal Medicine    Patient ID: [id number]     Chief Complaint: Diabetes      HPI:     Nick Son Jr is a 74 y.o. male here today for a follow up.   Weight is down 20lbs since his last visit  On Glipizide, took 10mg this am. CGM for the day reviewed and sugar has dropped drastically since he is off the steroids  Was on Lantus 15 units up until 3 days ago  Glucose 135 this am  Currently on ASA since plt count improved.  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   No transplant    PHACOEMULSIFICATION, CATARACT, WITH IOL INSERTION Right 9/10/2024    Procedure: PHACOEMULSIFICATION, CATARACT, WITH IOL INSERTION- OD;  Surgeon: Jorge A Quinteros MD;  Location: [facility] OR;  Service: Ophthalmology;  Laterality: Right;    SPINE SURGERY  has had 5 surgeries        Social History     Tobacco Use    Smoking status: Never    Smokeless tobacco: Never   Substance and Sexual Activity    Alcohol use: Not Currently     Comment: Occassioanlly    Drug use: Never    Sexual activity: Yes     Partners: Female     Birth control/protection: None        Current Outpatient Medications   Medication Instructions    aspirin (ECOTRIN) 81 mg, Daily    calcium  "citrate-vitamin D3 315-200 mg (CITRACAL+D) 315 mg-5 mcg (200 unit) per tablet 1 tablet, Every morning    cetirizine (ZYRTEC) 10 mg, Daily    cholecalciferol (vitamin D3) (VITAMIN D3) 400 Units, Daily    co-enzyme Q-10 30 mg capsule 1 capsule, Every morning    cyanocobalamin (VITAMIN B-12) 100 mcg, Daily    diphenoxylate-atropine 2.5-0.025 mg (LOMOTIL) 2.5-0.025 mg per tablet 1 tablet, Daily    entecavir (BARACLUDE) 0.5 mg, Daily    finasteride (PROSCAR) 5 mg, Oral, Daily    flash glucose sensor (FREESTYLE DERICK 2 SENSOR) Kit 1 each, Misc.(Non-Drug; Combo Route), Every 14 days    fluorouraciL (EFUDEX) 5 % cream As needed (PRN)    fluticasone propionate (FLONASE) 50 mcg/actuation nasal spray 1 spray, Daily    glipiZIDE (GLUCOTROL) 5-10 mg    lactulose (CHRONULAC) 20 g, Oral, Nightly PRN    levothyroxine (SYNTHROID) 100 MCG tablet 1 tablet, Daily    metoprolol tartrate (LOPRESSOR) 25 mg    multivitamin with folic acid 400 mcg Tab 1 tablet, Daily    niacinamide 500 mg, Nightly    nitroGLYCERIN (NITROSTAT) 0.3 mg, Every 5 min PRN    ondansetron (ZOFRAN) 8 mg, Oral, Every 8 hours PRN    pantoprazole (PROTONIX) 40 mg, Oral, Daily    pen needle, diabetic 31 gauge x 3/16" Ndle use as directed    pravastatin (PRAVACHOL) 80 mg, Nightly    tamsulosin (FLOMAX) 0.4 mg, Oral, Daily    traMADoL (ULTRAM) 50 mg    traZODone (DESYREL) 50 mg    valACYclovir (VALTREX) 500 mg, Daily       Review of patient's allergies indicates:   Allergen Reactions    Morphine Hives and Itching    Propoxyphene n-acetaminophen Hives and Itching    Iodinated contrast media Itching and Rash     12/7/22 - C/o delayed reaction following day with rash & itching to L knee to feet. contrast        Patient Care Team:  Ever Quezada MD as PCP - General (Internal Medicine)  Mohit Garcia OD (Ophthalmology)  Anders Alvarez MD as Consulting Physician (Cardiology)  Oncology, M.D. [place] - Rad (Radiation Oncology)     Subjective:     Review of " "Systems    12 point review of systems conducted, negative except as stated in the history of present illness. See HPI for details.    Objective:     Visit Vitals  /76 (BP Location: Right arm, Patient Position: Sitting)   Pulse 78   Temp 98 °F (36.7 °C) (Temporal)   Resp 16   Ht 5' 4" (1.626 m)   Wt 68.5 kg (151 lb)   SpO2 99%   BMI 25.92 kg/m²       Physical Exam  Constitutional:       General: He is not in acute distress.     Appearance: Normal appearance. He is ill-appearing. He is not toxic-appearing or diaphoretic.   HENT:      Head: Normocephalic and atraumatic.   Eyes:      Extraocular Movements: Extraocular movements intact.      Pupils: Pupils are equal, round, and reactive to light.   Cardiovascular:      Rate and Rhythm: Normal rate and regular rhythm.   Pulmonary:      Effort: Pulmonary effort is normal.      Breath sounds: Normal breath sounds.   Abdominal:      General: Bowel sounds are normal.      Palpations: Abdomen is soft.   Musculoskeletal:         General: Normal range of motion.   Skin:     General: Skin is warm and dry.   Neurological:      General: No focal deficit present.      Mental Status: He is alert.   Psychiatric:         Mood and Affect: Mood normal.         Labs Reviewed:     Chemistry:  Lab Results   Component Value Date     12/24/2024    K 4.3 12/24/2024    BUN 18 12/24/2024    CREATININE 1.04 12/24/2024    EGFRNORACEVR 54 10/30/2024    GLUCOSE 146 (H) 10/30/2024    CALCIUM 7.7 (L) 12/24/2024    ALKPHOS 53 10/30/2024    LABPROT 14.6 12/03/2024    ALBUMIN 3.8 10/30/2024    BILIDIR 0.1 11/29/2021    IBILI 0.20 11/29/2021    AST 23 10/30/2024    ALT 20 10/30/2024    MG 2.2 10/02/2017    PHOS 3.1 10/02/2017    UZMCBZTN33XG 30.1 02/12/2024    TSH 1.337 11/29/2024    PSA 3.04 02/12/2024        Lab Results   Component Value Date    HGBA1C 6.2 10/30/2024        Hematology:  Lab Results   Component Value Date    WBC 5.16 02/12/2024    HGB 14.7 02/12/2024    HCT 43.5 02/12/2024    "  02/12/2024       Lipid Panel:  Lab Results   Component Value Date    CHOL 178 12/13/2024    HDL 10 (L) 12/13/2024    .00 (H) 10/30/2024    TRIG 578 (H) 12/13/2024    TOTALCHOLEST 6 (H) 10/30/2024        Urine:  Lab Results   Component Value Date    APPEARANCEUA Clear 10/30/2024    SGUA 1.014 10/30/2024    PROTEINUA Negative 10/30/2024    KETONESUA Negative 10/30/2024    LEUKOCYTESUR Negative 10/30/2024    RBCUA None Seen 10/30/2024    WBCUA 0-5 10/30/2024    BACTERIA None Seen 10/30/2024    SQEPUA None Seen 10/30/2024    CREATRANDUR 64.8 10/30/2024        Assessment:       ICD-10-CM ICD-9-CM   1. Type 2 diabetes mellitus with stage 2 chronic kidney disease, without long-term current use of insulin  E11.22 250.40    N18.2 585.2        Plan:     1. Type 2 diabetes mellitus with stage 2 chronic kidney disease, without long-term current use of insulin  Assessment & Plan:  Creatine stable for now. according to latest data. Based on current GFR, CKD stage is stage 3 - GFR 30-59.   Avoid nephrotoxic medications and procedures.    Patient has completed his course of steroids in his no longer on basal insulin or sliding scale fasting sugar this morning 130, CGM data reviewed and glucose throughout the day is actually been a little bit lower than I would like he had to glipizide pills this morning.  Decrease Glipizide to  5 mg daily;  can increase to 5 mg b.i.d. if postprandials greater than 200 2 hours after eating.  RTC 8 weeks, sooner if needed  Would not use glipizide with insulin in the future given high risk of hypoglycemia           Follow up in about 2 months (around 3/14/2025). In addition to their scheduled follow up, the patient has also been instructed to follow up on as needed basis.     Future Appointments   Date Time Provider Department Center   3/13/2025  2:00 PM Ever Quezada MD [facility] [id number]        Ever Quezada MD  An office visit for an established patient was  performed. 10 minutes was used for reviewing the patients chart prior to the inoice visit done on that same day. 30 minutes was used during the visit in regards to taking the patient history and physical exam. There was also an additional 15 minutes spent on education and counseling regarding medical conditions, current medications including risk/benefit and side effects/adverse events, vaccine counseling. After leaving the exam room, the provider then spent an additional 5 minutes completing the electronic health record.    The patient is receptive, expresses understanding and is agreeable to plan. All questions answered; total time spent was 60 minutes.

## 2025-02-27 ENCOUNTER — TELEPHONE (OUTPATIENT)
Dept: INTERNAL MEDICINE | Facility: CLINIC | Age: 75
End: 2025-02-27
Payer: MEDICARE

## 2025-02-27 DIAGNOSIS — N18.2 TYPE 2 DIABETES MELLITUS WITH STAGE 2 CHRONIC KIDNEY DISEASE, WITHOUT LONG-TERM CURRENT USE OF INSULIN: Primary | ICD-10-CM

## 2025-02-27 DIAGNOSIS — E11.22 TYPE 2 DIABETES MELLITUS WITH STAGE 2 CHRONIC KIDNEY DISEASE, WITHOUT LONG-TERM CURRENT USE OF INSULIN: Primary | ICD-10-CM

## 2025-02-27 NOTE — TELEPHONE ENCOUNTER
----- Message from Med Assistant Wells sent at 2/27/2025 11:07 AM CST -----  Regarding: PV 3/13/25 @ 2:00 Dr. Moon  1. Are there any outstanding tasks in the patient's chart? Yes, Fasting Labs 2. Does patient have home blood pressure cuff?  [ ] Yes  /   [ ] No(If yes, please have patient bring to appointment for validation.)3. Remind patient to bring in a list of medications or bottles of all medications including: A. All Prescription MedicationsB. Over-the-Counter Supplements and/or VitaminsC. Drops (ear and/or eye)D. Topical Creams

## (undated) DEVICE — KIT CANIST SUCTION 1200CC

## (undated) DEVICE — BAG LABGUARD BIOHAZARD 6X9IN

## (undated) DEVICE — FORCEP BX CAPT 2.8X2.4MM160CM

## (undated) DEVICE — CONTAINER SPECIMEN SCREW 4OZ

## (undated) DEVICE — TUBING O2 FEMALE CONN 13FT

## (undated) DEVICE — SOL IRRI STRL WATER 1000ML

## (undated) DEVICE — COLLECTION SPECIMEN NEPTUNE

## (undated) DEVICE — TIP SUCTION YANKAUER

## (undated) DEVICE — KIT SURGICAL COLON .25 1.1OZ